# Patient Record
Sex: MALE | Race: WHITE | NOT HISPANIC OR LATINO | Employment: OTHER | URBAN - METROPOLITAN AREA
[De-identification: names, ages, dates, MRNs, and addresses within clinical notes are randomized per-mention and may not be internally consistent; named-entity substitution may affect disease eponyms.]

---

## 2023-01-01 ENCOUNTER — APPOINTMENT (OUTPATIENT)
Dept: RADIOLOGY | Facility: MEDICAL CENTER | Age: 58
DRG: 207 | End: 2023-01-01
Attending: INTERNAL MEDICINE
Payer: MEDICARE

## 2023-01-01 ENCOUNTER — APPOINTMENT (OUTPATIENT)
Dept: CARDIOLOGY | Facility: MEDICAL CENTER | Age: 58
DRG: 207 | End: 2023-01-01
Attending: STUDENT IN AN ORGANIZED HEALTH CARE EDUCATION/TRAINING PROGRAM
Payer: MEDICARE

## 2023-01-01 ENCOUNTER — APPOINTMENT (OUTPATIENT)
Dept: RADIOLOGY | Facility: MEDICAL CENTER | Age: 58
DRG: 207 | End: 2023-01-01
Attending: STUDENT IN AN ORGANIZED HEALTH CARE EDUCATION/TRAINING PROGRAM
Payer: MEDICARE

## 2023-01-01 ENCOUNTER — HOSPITAL ENCOUNTER (OUTPATIENT)
Dept: RADIOLOGY | Facility: MEDICAL CENTER | Age: 58
End: 2023-03-21

## 2023-01-01 ENCOUNTER — HOSPITAL ENCOUNTER (INPATIENT)
Facility: MEDICAL CENTER | Age: 58
LOS: 12 days | DRG: 207 | End: 2023-04-02
Attending: INTERNAL MEDICINE | Admitting: INTERNAL MEDICINE
Payer: MEDICARE

## 2023-01-01 ENCOUNTER — PATIENT OUTREACH (OUTPATIENT)
Dept: HEALTH INFORMATION MANAGEMENT | Facility: OTHER | Age: 58
End: 2023-01-01

## 2023-01-01 VITALS
HEART RATE: 141 BPM | HEIGHT: 72 IN | WEIGHT: 313.27 LBS | OXYGEN SATURATION: 84 % | DIASTOLIC BLOOD PRESSURE: 55 MMHG | RESPIRATION RATE: 15 BRPM | TEMPERATURE: 97 F | BODY MASS INDEX: 42.43 KG/M2 | SYSTOLIC BLOOD PRESSURE: 90 MMHG

## 2023-01-01 DIAGNOSIS — I27.20 SEVERE PULMONARY HYPERTENSION (HCC): ICD-10-CM

## 2023-01-01 DIAGNOSIS — J18.9 HOSPITAL ACQUIRED PNA: ICD-10-CM
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
## 2023-01-01 DIAGNOSIS — N17.9 ACUTE KIDNEY INJURY (HCC): ICD-10-CM

## 2023-01-01 DIAGNOSIS — J80 ARDS (ADULT RESPIRATORY DISTRESS SYNDROME) (HCC): ICD-10-CM

## 2023-01-01 LAB
1 3 BETA D GLUCAN INTERP Q4483: NEGATIVE
1,3 BETA GLUCAN SER-MCNC: <31 PG/ML
A FLAVUS AB SER QL ID: NORMAL
A FUMIGATUS1 AB SER QL ID: NORMAL
A FUMIGATUS2 AB SER QL: NORMAL
A FUMIGATUS3 AB SER QL ID: NORMAL
A FUMIGATUS6 AB SER QL ID: NORMAL
A PULLULANS AB SER QL ID: NORMAL
ALBUMIN SERPL BCP-MCNC: 2.8 G/DL (ref 3.2–4.9)
ALBUMIN SERPL BCP-MCNC: 2.9 G/DL (ref 3.2–4.9)
ALBUMIN SERPL BCP-MCNC: 3.1 G/DL (ref 3.2–4.9)
ALBUMIN SERPL BCP-MCNC: 3.2 G/DL (ref 3.2–4.9)
ALBUMIN SERPL BCP-MCNC: 3.4 G/DL (ref 3.2–4.9)
ALBUMIN SERPL BCP-MCNC: 3.4 G/DL (ref 3.2–4.9)
ALBUMIN/GLOB SERPL: 0.8 G/DL
ALBUMIN/GLOB SERPL: 0.9 G/DL
ALBUMIN/GLOB SERPL: 0.9 G/DL
ALBUMIN/GLOB SERPL: 1 G/DL
ALBUMIN/GLOB SERPL: 1.1 G/DL
ALBUMIN/GLOB SERPL: 1.1 G/DL
ALDOLASE SERPL-CCNC: 11 U/L (ref 1.2–7.6)
ALP SERPL-CCNC: 102 U/L (ref 30–99)
ALP SERPL-CCNC: 79 U/L (ref 30–99)
ALP SERPL-CCNC: 79 U/L (ref 30–99)
ALP SERPL-CCNC: 80 U/L (ref 30–99)
ALP SERPL-CCNC: 81 U/L (ref 30–99)
ALP SERPL-CCNC: 87 U/L (ref 30–99)
ALP SERPL-CCNC: 89 U/L (ref 30–99)
ALP SERPL-CCNC: 94 U/L (ref 30–99)
ALP SERPL-CCNC: 96 U/L (ref 30–99)
ALP SERPL-CCNC: 97 U/L (ref 30–99)
ALT SERPL-CCNC: 28 U/L (ref 2–50)
ALT SERPL-CCNC: 29 U/L (ref 2–50)
ALT SERPL-CCNC: 31 U/L (ref 2–50)
ALT SERPL-CCNC: 32 U/L (ref 2–50)
ALT SERPL-CCNC: 34 U/L (ref 2–50)
ALT SERPL-CCNC: 36 U/L (ref 2–50)
ALT SERPL-CCNC: 37 U/L (ref 2–50)
ALT SERPL-CCNC: 37 U/L (ref 2–50)
ALT SERPL-CCNC: 50 U/L (ref 2–50)
ALT SERPL-CCNC: 56 U/L (ref 2–50)
ANION GAP SERPL CALC-SCNC: 0 MMOL/L (ref 7–16)
ANION GAP SERPL CALC-SCNC: 10 MMOL/L (ref 7–16)
ANION GAP SERPL CALC-SCNC: 11 MMOL/L (ref 7–16)
ANION GAP SERPL CALC-SCNC: 12 MMOL/L (ref 7–16)
ANION GAP SERPL CALC-SCNC: 12 MMOL/L (ref 7–16)
ANION GAP SERPL CALC-SCNC: 13 MMOL/L (ref 7–16)
ANION GAP SERPL CALC-SCNC: 14 MMOL/L (ref 7–16)
ANION GAP SERPL CALC-SCNC: 14 MMOL/L (ref 7–16)
ANION GAP SERPL CALC-SCNC: 15 MMOL/L (ref 7–16)
ANION GAP SERPL CALC-SCNC: 16 MMOL/L (ref 7–16)
ANION GAP SERPL CALC-SCNC: 16 MMOL/L (ref 7–16)
ANION GAP SERPL CALC-SCNC: 19 MMOL/L (ref 7–16)
ANION GAP SERPL CALC-SCNC: 19 MMOL/L (ref 7–16)
ANION GAP SERPL CALC-SCNC: 23 MMOL/L (ref 7–16)
ANNOTATION COMMENT IMP: ABNORMAL
APPEARANCE FLD: CLEAR
AST SERPL-CCNC: 15 U/L (ref 12–45)
AST SERPL-CCNC: 19 U/L (ref 12–45)
AST SERPL-CCNC: 24 U/L (ref 12–45)
AST SERPL-CCNC: 26 U/L (ref 12–45)
AST SERPL-CCNC: 27 U/L (ref 12–45)
AST SERPL-CCNC: 27 U/L (ref 12–45)
AST SERPL-CCNC: 28 U/L (ref 12–45)
AST SERPL-CCNC: 29 U/L (ref 12–45)
AST SERPL-CCNC: 29 U/L (ref 12–45)
AST SERPL-CCNC: 32 U/L (ref 12–45)
B PARAP IS1001 DNA NPH QL NAA+NON-PROBE: NOT DETECTED
B PERT.PT PRMT NPH QL NAA+NON-PROBE: NOT DETECTED
BACTERIA BLD CULT: NORMAL
BACTERIA BLD CULT: NORMAL
BACTERIA SPEC RESP CULT: ABNORMAL
BACTERIA SPEC RESP CULT: ABNORMAL
BACTERIA SPEC RESP CULT: NORMAL
BASE EXCESS BLDA CALC-SCNC: -1 MMOL/L (ref -4–3)
BASE EXCESS BLDA CALC-SCNC: -1 MMOL/L (ref -4–3)
BASE EXCESS BLDA CALC-SCNC: -2 MMOL/L (ref -4–3)
BASE EXCESS BLDA CALC-SCNC: -3 MMOL/L (ref -4–3)
BASE EXCESS BLDA CALC-SCNC: -4 MMOL/L (ref -4–3)
BASE EXCESS BLDA CALC-SCNC: -7 MMOL/L (ref -4–3)
BASE EXCESS BLDA CALC-SCNC: -7 MMOL/L (ref -4–3)
BASE EXCESS BLDA CALC-SCNC: -8 MMOL/L (ref -4–3)
BASE EXCESS BLDA CALC-SCNC: -9 MMOL/L (ref -4–3)
BASE EXCESS BLDA CALC-SCNC: -9 MMOL/L (ref -4–3)
BASE EXCESS BLDA CALC-SCNC: 0 MMOL/L (ref -4–3)
BASE EXCESS BLDA CALC-SCNC: 1 MMOL/L (ref -4–3)
BASE EXCESS BLDA CALC-SCNC: 10 MMOL/L (ref -4–3)
BASE EXCESS BLDA CALC-SCNC: 2 MMOL/L (ref -4–3)
BASE EXCESS BLDA CALC-SCNC: 3 MMOL/L (ref -4–3)
BASE EXCESS BLDA CALC-SCNC: 4 MMOL/L (ref -4–3)
BASE EXCESS BLDA CALC-SCNC: 8 MMOL/L (ref -4–3)
BASOPHILS # BLD AUTO: 0.1 % (ref 0–1.8)
BASOPHILS # BLD AUTO: 0.2 % (ref 0–1.8)
BASOPHILS # BLD: 0.01 K/UL (ref 0–0.12)
BASOPHILS # BLD: 0.02 K/UL (ref 0–0.12)
BASOPHILS # BLD: 0.03 K/UL (ref 0–0.12)
BASOPHILS # BLD: 0.05 K/UL (ref 0–0.12)
BASOPHILS # BLD: 0.06 K/UL (ref 0–0.12)
BILIRUB SERPL-MCNC: 0.2 MG/DL (ref 0.1–1.5)
BILIRUB SERPL-MCNC: 0.3 MG/DL (ref 0.1–1.5)
BILIRUB SERPL-MCNC: 0.3 MG/DL (ref 0.1–1.5)
BILIRUB SERPL-MCNC: 0.4 MG/DL (ref 0.1–1.5)
BILIRUB SERPL-MCNC: 0.4 MG/DL (ref 0.1–1.5)
BODY FLD TYPE: NORMAL
BODY TEMPERATURE: 36 CENTIGRADE
BODY TEMPERATURE: 36.5 CENTIGRADE
BODY TEMPERATURE: ABNORMAL DEGREES
BREATHS SETTING VENT: 26
BREATHS SETTING VENT: 3
BREATHS SETTING VENT: 32
BREATHS SETTING VENT: 34
BREATHS SETTING VENT: 36
BREATHS SETTING VENT: 38
BREATHS SETTING VENT: 40
BUN SERPL-MCNC: 100 MG/DL (ref 8–22)
BUN SERPL-MCNC: 102 MG/DL (ref 8–22)
BUN SERPL-MCNC: 102 MG/DL (ref 8–22)
BUN SERPL-MCNC: 108 MG/DL (ref 8–22)
BUN SERPL-MCNC: 111 MG/DL (ref 8–22)
BUN SERPL-MCNC: 37 MG/DL (ref 8–22)
BUN SERPL-MCNC: 40 MG/DL (ref 8–22)
BUN SERPL-MCNC: 41 MG/DL (ref 8–22)
BUN SERPL-MCNC: 42 MG/DL (ref 8–22)
BUN SERPL-MCNC: 42 MG/DL (ref 8–22)
BUN SERPL-MCNC: 46 MG/DL (ref 8–22)
BUN SERPL-MCNC: 47 MG/DL (ref 8–22)
BUN SERPL-MCNC: 64 MG/DL (ref 8–22)
BUN SERPL-MCNC: 69 MG/DL (ref 8–22)
BUN SERPL-MCNC: 79 MG/DL (ref 8–22)
BUN SERPL-MCNC: 85 MG/DL (ref 8–22)
BUN SERPL-MCNC: 87 MG/DL (ref 8–22)
BUN SERPL-MCNC: 90 MG/DL (ref 8–22)
BUN SERPL-MCNC: 91 MG/DL (ref 8–22)
BUN SERPL-MCNC: 95 MG/DL (ref 8–22)
C IMMITIS AB SER QL ID: NOT DETECTED
C PNEUM DNA NPH QL NAA+NON-PROBE: NOT DETECTED
C3 SERPL-MCNC: 149.2 MG/DL (ref 87–200)
C4 SERPL-MCNC: 28.9 MG/DL (ref 19–52)
CALCIUM ALBUM COR SERPL-MCNC: 10.2 MG/DL (ref 8.5–10.5)
CALCIUM ALBUM COR SERPL-MCNC: 9.1 MG/DL (ref 8.5–10.5)
CALCIUM ALBUM COR SERPL-MCNC: 9.2 MG/DL (ref 8.5–10.5)
CALCIUM ALBUM COR SERPL-MCNC: 9.3 MG/DL (ref 8.5–10.5)
CALCIUM ALBUM COR SERPL-MCNC: 9.3 MG/DL (ref 8.5–10.5)
CALCIUM ALBUM COR SERPL-MCNC: 9.4 MG/DL (ref 8.5–10.5)
CALCIUM ALBUM COR SERPL-MCNC: 9.5 MG/DL (ref 8.5–10.5)
CALCIUM ALBUM COR SERPL-MCNC: 9.6 MG/DL (ref 8.5–10.5)
CALCIUM ALBUM COR SERPL-MCNC: 9.6 MG/DL (ref 8.5–10.5)
CALCIUM ALBUM COR SERPL-MCNC: 9.8 MG/DL (ref 8.5–10.5)
CALCIUM SERPL-MCNC: 7.7 MG/DL (ref 8.5–10.5)
CALCIUM SERPL-MCNC: 8.2 MG/DL (ref 8.5–10.5)
CALCIUM SERPL-MCNC: 8.4 MG/DL (ref 8.5–10.5)
CALCIUM SERPL-MCNC: 8.4 MG/DL (ref 8.5–10.5)
CALCIUM SERPL-MCNC: 8.6 MG/DL (ref 8.5–10.5)
CALCIUM SERPL-MCNC: 8.7 MG/DL (ref 8.5–10.5)
CALCIUM SERPL-MCNC: 8.9 MG/DL (ref 8.5–10.5)
CALCIUM SERPL-MCNC: 9 MG/DL (ref 8.5–10.5)
CALCIUM SERPL-MCNC: 9 MG/DL (ref 8.5–10.5)
CALCIUM SERPL-MCNC: 9.1 MG/DL (ref 8.5–10.5)
CALCIUM SERPL-MCNC: 9.3 MG/DL (ref 8.5–10.5)
CALCIUM SERPL-MCNC: 9.3 MG/DL (ref 8.5–10.5)
CALCIUM SERPL-MCNC: 9.4 MG/DL (ref 8.5–10.5)
CALCIUM SERPL-MCNC: 9.4 MG/DL (ref 8.5–10.5)
CALCIUM SERPL-MCNC: 9.5 MG/DL (ref 8.5–10.5)
CALCIUM SERPL-MCNC: 9.5 MG/DL (ref 8.5–10.5)
CALCIUM SERPL-MCNC: 9.6 MG/DL (ref 8.5–10.5)
CALCIUM SERPL-MCNC: 9.6 MG/DL (ref 8.5–10.5)
CARBAMYLATED PROTEIN ANTIBODY, IGG Q6043: 7 UNITS (ref 0–19)
CCP IGG SERPL-ACNC: 2 UNITS (ref 0–19)
CD3 CELLS # BLD: 709 CELLS/UL (ref 570–2400)
CD3+CD4+ CELLS # BLD: 495 CELLS/UL (ref 430–1800)
CD3+CD4+ CELLS/CD3+CD8+ CLL BLD: 2.37 RATIO (ref 0.8–3.9)
CD3+CD8+ CELLS # BLD: 209 CELLS/UL (ref 210–1200)
CENTROMERE IGG TITR SER IF: 0 AU/ML (ref 0–40)
CHLORIDE SERPL-SCNC: 100 MMOL/L (ref 96–112)
CHLORIDE SERPL-SCNC: 101 MMOL/L (ref 96–112)
CHLORIDE SERPL-SCNC: 101 MMOL/L (ref 96–112)
CHLORIDE SERPL-SCNC: 102 MMOL/L (ref 96–112)
CHLORIDE SERPL-SCNC: 103 MMOL/L (ref 96–112)
CHLORIDE SERPL-SCNC: 104 MMOL/L (ref 96–112)
CHLORIDE SERPL-SCNC: 106 MMOL/L (ref 96–112)
CHLORIDE SERPL-SCNC: 106 MMOL/L (ref 96–112)
CHLORIDE SERPL-SCNC: 109 MMOL/L (ref 96–112)
CHLORIDE SERPL-SCNC: 93 MMOL/L (ref 96–112)
CHLORIDE SERPL-SCNC: 95 MMOL/L (ref 96–112)
CHLORIDE SERPL-SCNC: 95 MMOL/L (ref 96–112)
CHLORIDE SERPL-SCNC: 96 MMOL/L (ref 96–112)
CHLORIDE SERPL-SCNC: 96 MMOL/L (ref 96–112)
CHLORIDE SERPL-SCNC: 99 MMOL/L (ref 96–112)
CHLORIDE SERPL-SCNC: 99 MMOL/L (ref 96–112)
CK SERPL-CCNC: 29 U/L (ref 0–154)
CO2 BLDA-SCNC: 25 MMOL/L (ref 20–33)
CO2 BLDA-SCNC: 26 MMOL/L (ref 20–33)
CO2 BLDA-SCNC: 27 MMOL/L (ref 20–33)
CO2 BLDA-SCNC: 28 MMOL/L (ref 20–33)
CO2 BLDA-SCNC: 29 MMOL/L (ref 20–33)
CO2 BLDA-SCNC: 30 MMOL/L (ref 20–33)
CO2 BLDA-SCNC: 31 MMOL/L (ref 20–33)
CO2 BLDA-SCNC: 32 MMOL/L (ref 20–33)
CO2 BLDA-SCNC: 32 MMOL/L (ref 20–33)
CO2 BLDA-SCNC: 35 MMOL/L (ref 20–33)
CO2 BLDA-SCNC: 35 MMOL/L (ref 20–33)
CO2 BLDA-SCNC: 36 MMOL/L (ref 20–33)
CO2 BLDA-SCNC: 37 MMOL/L (ref 20–33)
CO2 BLDA-SCNC: 38 MMOL/L (ref 20–33)
CO2 BLDA-SCNC: 39 MMOL/L (ref 20–33)
CO2 SERPL-SCNC: 18 MMOL/L (ref 20–33)
CO2 SERPL-SCNC: 20 MMOL/L (ref 20–33)
CO2 SERPL-SCNC: 22 MMOL/L (ref 20–33)
CO2 SERPL-SCNC: 23 MMOL/L (ref 20–33)
CO2 SERPL-SCNC: 25 MMOL/L (ref 20–33)
CO2 SERPL-SCNC: 25 MMOL/L (ref 20–33)
CO2 SERPL-SCNC: 26 MMOL/L (ref 20–33)
CO2 SERPL-SCNC: 27 MMOL/L (ref 20–33)
CO2 SERPL-SCNC: 28 MMOL/L (ref 20–33)
CO2 SERPL-SCNC: 29 MMOL/L (ref 20–33)
CO2 SERPL-SCNC: 30 MMOL/L (ref 20–33)
COCCIDIOIDES AB TITR SER CF: NORMAL {TITER}
COCCIDIOIDES IGG SER-ACNC: 0.6 IV
COCCIDIOIDES IGM SERPL-ACNC: 0.3 IV
COLOR FLD: NORMAL
CREAT SERPL-MCNC: 0.71 MG/DL (ref 0.5–1.4)
CREAT SERPL-MCNC: 0.72 MG/DL (ref 0.5–1.4)
CREAT SERPL-MCNC: 0.81 MG/DL (ref 0.5–1.4)
CREAT SERPL-MCNC: 0.87 MG/DL (ref 0.5–1.4)
CREAT SERPL-MCNC: 0.9 MG/DL (ref 0.5–1.4)
CREAT SERPL-MCNC: 0.93 MG/DL (ref 0.5–1.4)
CREAT SERPL-MCNC: 1.02 MG/DL (ref 0.5–1.4)
CREAT SERPL-MCNC: 1.4 MG/DL (ref 0.5–1.4)
CREAT SERPL-MCNC: 1.41 MG/DL (ref 0.5–1.4)
CREAT SERPL-MCNC: 2.12 MG/DL (ref 0.5–1.4)
CREAT SERPL-MCNC: 2.3 MG/DL (ref 0.5–1.4)
CREAT SERPL-MCNC: 2.4 MG/DL (ref 0.5–1.4)
CREAT SERPL-MCNC: 2.45 MG/DL (ref 0.5–1.4)
CREAT SERPL-MCNC: 2.55 MG/DL (ref 0.5–1.4)
CREAT SERPL-MCNC: 2.62 MG/DL (ref 0.5–1.4)
CREAT SERPL-MCNC: 2.63 MG/DL (ref 0.5–1.4)
CREAT SERPL-MCNC: 2.78 MG/DL (ref 0.5–1.4)
CREAT SERPL-MCNC: 3.67 MG/DL (ref 0.5–1.4)
CREAT SERPL-MCNC: 3.9 MG/DL (ref 0.5–1.4)
CREAT SERPL-MCNC: 4.6 MG/DL (ref 0.5–1.4)
CRP SERPL HS-MCNC: 0.9 MG/DL (ref 0–0.75)
CRP SERPL HS-MCNC: 1.09 MG/DL (ref 0–0.75)
CRP SERPL HS-MCNC: 2.78 MG/DL (ref 0–0.75)
CRP SERPL HS-MCNC: 6.02 MG/DL (ref 0–0.75)
CYTOLOGY REG CYTOL: NORMAL
CYTOLOGY REG CYTOL: NORMAL
DELSYS IDSYS: ABNORMAL
EKG IMPRESSION: NORMAL
ENA JO1 AB TITR SER: 0 AU/ML (ref 0–40)
ENA SCL70 IGG SER QL: 2 AU/ML (ref 0–40)
ENA SM IGG SER-ACNC: 1 AU/ML (ref 0–40)
ENA SS-B IGG SER IA-ACNC: 1 AU/ML (ref 0–40)
END TIDAL CARBON DIOXIDE IECO2: 63 MMHG
END TIDAL CARBON DIOXIDE IECO2: 65 MMHG
END TIDAL CARBON DIOXIDE IECO2: 68 MMHG
END TIDAL CARBON DIOXIDE IECO2: 69 MMHG
EOSINOPHIL # BLD AUTO: 0 K/UL (ref 0–0.51)
EOSINOPHIL # BLD AUTO: 0.01 K/UL (ref 0–0.51)
EOSINOPHIL # BLD AUTO: 0.01 K/UL (ref 0–0.51)
EOSINOPHIL # BLD AUTO: 0.02 K/UL (ref 0–0.51)
EOSINOPHIL # BLD AUTO: 0.07 K/UL (ref 0–0.51)
EOSINOPHIL # BLD AUTO: 0.13 K/UL (ref 0–0.51)
EOSINOPHIL # BLD AUTO: 0.14 K/UL (ref 0–0.51)
EOSINOPHIL # BLD AUTO: 0.2 K/UL (ref 0–0.51)
EOSINOPHIL NFR BLD: 0 % (ref 0–6.9)
EOSINOPHIL NFR BLD: 0.1 % (ref 0–6.9)
EOSINOPHIL NFR BLD: 0.3 % (ref 0–6.9)
EOSINOPHIL NFR BLD: 0.4 % (ref 0–6.9)
EOSINOPHIL NFR BLD: 0.6 % (ref 0–6.9)
EOSINOPHIL NFR BLD: 0.6 % (ref 0–6.9)
ERYTHROCYTE [DISTWIDTH] IN BLOOD BY AUTOMATED COUNT: 42.8 FL (ref 35.9–50)
ERYTHROCYTE [DISTWIDTH] IN BLOOD BY AUTOMATED COUNT: 43.9 FL (ref 35.9–50)
ERYTHROCYTE [DISTWIDTH] IN BLOOD BY AUTOMATED COUNT: 45.6 FL (ref 35.9–50)
ERYTHROCYTE [DISTWIDTH] IN BLOOD BY AUTOMATED COUNT: 46 FL (ref 35.9–50)
ERYTHROCYTE [DISTWIDTH] IN BLOOD BY AUTOMATED COUNT: 46 FL (ref 35.9–50)
ERYTHROCYTE [DISTWIDTH] IN BLOOD BY AUTOMATED COUNT: 46.5 FL (ref 35.9–50)
ERYTHROCYTE [DISTWIDTH] IN BLOOD BY AUTOMATED COUNT: 47 FL (ref 35.9–50)
ERYTHROCYTE [DISTWIDTH] IN BLOOD BY AUTOMATED COUNT: 47 FL (ref 35.9–50)
ERYTHROCYTE [DISTWIDTH] IN BLOOD BY AUTOMATED COUNT: 47.4 FL (ref 35.9–50)
ERYTHROCYTE [DISTWIDTH] IN BLOOD BY AUTOMATED COUNT: 47.7 FL (ref 35.9–50)
ERYTHROCYTE [DISTWIDTH] IN BLOOD BY AUTOMATED COUNT: 48.6 FL (ref 35.9–50)
ERYTHROCYTE [DISTWIDTH] IN BLOOD BY AUTOMATED COUNT: 48.8 FL (ref 35.9–50)
ERYTHROCYTE [DISTWIDTH] IN BLOOD BY AUTOMATED COUNT: 49.2 FL (ref 35.9–50)
ERYTHROCYTE [SEDIMENTATION RATE] IN BLOOD BY WESTERGREN METHOD: 20 MM/HOUR (ref 0–20)
ERYTHROCYTE [SEDIMENTATION RATE] IN BLOOD BY WESTERGREN METHOD: 20 MM/HOUR (ref 0–20)
ERYTHROCYTE [SEDIMENTATION RATE] IN BLOOD BY WESTERGREN METHOD: 26 MM/HOUR (ref 0–20)
ERYTHROCYTE [SEDIMENTATION RATE] IN BLOOD BY WESTERGREN METHOD: 30 MM/HOUR (ref 0–20)
ERYTHROCYTE [SEDIMENTATION RATE] IN BLOOD BY WESTERGREN METHOD: 34 MM/HOUR (ref 0–20)
ERYTHROCYTE [SEDIMENTATION RATE] IN BLOOD BY WESTERGREN METHOD: 34 MM/HOUR (ref 0–20)
ERYTHROCYTE [SEDIMENTATION RATE] IN BLOOD BY WESTERGREN METHOD: 48 MM/HOUR (ref 0–20)
ERYTHROCYTE [SEDIMENTATION RATE] IN BLOOD BY WESTERGREN METHOD: 50 MM/HOUR (ref 0–20)
ERYTHROCYTE [SEDIMENTATION RATE] IN BLOOD BY WESTERGREN METHOD: 6 MM/HOUR (ref 0–20)
ERYTHROCYTE [SEDIMENTATION RATE] IN BLOOD BY WESTERGREN METHOD: 79 MM/HOUR (ref 0–20)
ERYTHROCYTE [SEDIMENTATION RATE] IN BLOOD BY WESTERGREN METHOD: 9 MM/HOUR (ref 0–20)
EST. AVERAGE GLUCOSE BLD GHB EST-MCNC: 174 MG/DL
FLUAV RNA NPH QL NAA+NON-PROBE: NOT DETECTED
FLUAV RNA SPEC QL NAA+PROBE: NEGATIVE
FLUBV RNA NPH QL NAA+NON-PROBE: NOT DETECTED
FLUBV RNA SPEC QL NAA+PROBE: NEGATIVE
FUNGUS SPEC FUNGUS STN: NORMAL
GALACTOMANNAN AG SERPL QL IA: NEGATIVE
GALACTOMANNAN AG SERPL-ACNC: 0.2
GFR SERPLBLD CREATININE-BSD FMLA CKD-EPI: 101 ML/MIN/1.73 M 2
GFR SERPLBLD CREATININE-BSD FMLA CKD-EPI: 105 ML/MIN/1.73 M 2
GFR SERPLBLD CREATININE-BSD FMLA CKD-EPI: 105 ML/MIN/1.73 M 2
GFR SERPLBLD CREATININE-BSD FMLA CKD-EPI: 14 ML/MIN/1.73 M 2
GFR SERPLBLD CREATININE-BSD FMLA CKD-EPI: 17 ML/MIN/1.73 M 2
GFR SERPLBLD CREATININE-BSD FMLA CKD-EPI: 18 ML/MIN/1.73 M 2
GFR SERPLBLD CREATININE-BSD FMLA CKD-EPI: 25 ML/MIN/1.73 M 2
GFR SERPLBLD CREATININE-BSD FMLA CKD-EPI: 27 ML/MIN/1.73 M 2
GFR SERPLBLD CREATININE-BSD FMLA CKD-EPI: 27 ML/MIN/1.73 M 2
GFR SERPLBLD CREATININE-BSD FMLA CKD-EPI: 28 ML/MIN/1.73 M 2
GFR SERPLBLD CREATININE-BSD FMLA CKD-EPI: 29 ML/MIN/1.73 M 2
GFR SERPLBLD CREATININE-BSD FMLA CKD-EPI: 30 ML/MIN/1.73 M 2
GFR SERPLBLD CREATININE-BSD FMLA CKD-EPI: 32 ML/MIN/1.73 M 2
GFR SERPLBLD CREATININE-BSD FMLA CKD-EPI: 35 ML/MIN/1.73 M 2
GFR SERPLBLD CREATININE-BSD FMLA CKD-EPI: 57 ML/MIN/1.73 M 2
GFR SERPLBLD CREATININE-BSD FMLA CKD-EPI: 58 ML/MIN/1.73 M 2
GFR SERPLBLD CREATININE-BSD FMLA CKD-EPI: 84 ML/MIN/1.73 M 2
GFR SERPLBLD CREATININE-BSD FMLA CKD-EPI: 94 ML/MIN/1.73 M 2
GFR SERPLBLD CREATININE-BSD FMLA CKD-EPI: 98 ML/MIN/1.73 M 2
GFR SERPLBLD CREATININE-BSD FMLA CKD-EPI: 99 ML/MIN/1.73 M 2
GLOBULIN SER CALC-MCNC: 2.7 G/DL (ref 1.9–3.5)
GLOBULIN SER CALC-MCNC: 2.8 G/DL (ref 1.9–3.5)
GLOBULIN SER CALC-MCNC: 2.8 G/DL (ref 1.9–3.5)
GLOBULIN SER CALC-MCNC: 2.9 G/DL (ref 1.9–3.5)
GLOBULIN SER CALC-MCNC: 3.1 G/DL (ref 1.9–3.5)
GLOBULIN SER CALC-MCNC: 3.3 G/DL (ref 1.9–3.5)
GLOBULIN SER CALC-MCNC: 3.4 G/DL (ref 1.9–3.5)
GLOBULIN SER CALC-MCNC: 3.5 G/DL (ref 1.9–3.5)
GLOBULIN SER CALC-MCNC: 3.5 G/DL (ref 1.9–3.5)
GLOBULIN SER CALC-MCNC: 3.9 G/DL (ref 1.9–3.5)
GLUCOSE BLD STRIP.AUTO-MCNC: 104 MG/DL (ref 65–99)
GLUCOSE BLD STRIP.AUTO-MCNC: 112 MG/DL (ref 65–99)
GLUCOSE BLD STRIP.AUTO-MCNC: 113 MG/DL (ref 65–99)
GLUCOSE BLD STRIP.AUTO-MCNC: 114 MG/DL (ref 65–99)
GLUCOSE BLD STRIP.AUTO-MCNC: 118 MG/DL (ref 65–99)
GLUCOSE BLD STRIP.AUTO-MCNC: 118 MG/DL (ref 65–99)
GLUCOSE BLD STRIP.AUTO-MCNC: 120 MG/DL (ref 65–99)
GLUCOSE BLD STRIP.AUTO-MCNC: 120 MG/DL (ref 65–99)
GLUCOSE BLD STRIP.AUTO-MCNC: 122 MG/DL (ref 65–99)
GLUCOSE BLD STRIP.AUTO-MCNC: 124 MG/DL (ref 65–99)
GLUCOSE BLD STRIP.AUTO-MCNC: 124 MG/DL (ref 65–99)
GLUCOSE BLD STRIP.AUTO-MCNC: 128 MG/DL (ref 65–99)
GLUCOSE BLD STRIP.AUTO-MCNC: 129 MG/DL (ref 65–99)
GLUCOSE BLD STRIP.AUTO-MCNC: 130 MG/DL (ref 65–99)
GLUCOSE BLD STRIP.AUTO-MCNC: 133 MG/DL (ref 65–99)
GLUCOSE BLD STRIP.AUTO-MCNC: 136 MG/DL (ref 65–99)
GLUCOSE BLD STRIP.AUTO-MCNC: 137 MG/DL (ref 65–99)
GLUCOSE BLD STRIP.AUTO-MCNC: 137 MG/DL (ref 65–99)
GLUCOSE BLD STRIP.AUTO-MCNC: 138 MG/DL (ref 65–99)
GLUCOSE BLD STRIP.AUTO-MCNC: 140 MG/DL (ref 65–99)
GLUCOSE BLD STRIP.AUTO-MCNC: 142 MG/DL (ref 65–99)
GLUCOSE BLD STRIP.AUTO-MCNC: 144 MG/DL (ref 65–99)
GLUCOSE BLD STRIP.AUTO-MCNC: 145 MG/DL (ref 65–99)
GLUCOSE BLD STRIP.AUTO-MCNC: 146 MG/DL (ref 65–99)
GLUCOSE BLD STRIP.AUTO-MCNC: 147 MG/DL (ref 65–99)
GLUCOSE BLD STRIP.AUTO-MCNC: 147 MG/DL (ref 65–99)
GLUCOSE BLD STRIP.AUTO-MCNC: 149 MG/DL (ref 65–99)
GLUCOSE BLD STRIP.AUTO-MCNC: 149 MG/DL (ref 65–99)
GLUCOSE BLD STRIP.AUTO-MCNC: 150 MG/DL (ref 65–99)
GLUCOSE BLD STRIP.AUTO-MCNC: 150 MG/DL (ref 65–99)
GLUCOSE BLD STRIP.AUTO-MCNC: 152 MG/DL (ref 65–99)
GLUCOSE BLD STRIP.AUTO-MCNC: 153 MG/DL (ref 65–99)
GLUCOSE BLD STRIP.AUTO-MCNC: 153 MG/DL (ref 65–99)
GLUCOSE BLD STRIP.AUTO-MCNC: 154 MG/DL (ref 65–99)
GLUCOSE BLD STRIP.AUTO-MCNC: 155 MG/DL (ref 65–99)
GLUCOSE BLD STRIP.AUTO-MCNC: 156 MG/DL (ref 65–99)
GLUCOSE BLD STRIP.AUTO-MCNC: 157 MG/DL (ref 65–99)
GLUCOSE BLD STRIP.AUTO-MCNC: 157 MG/DL (ref 65–99)
GLUCOSE BLD STRIP.AUTO-MCNC: 160 MG/DL (ref 65–99)
GLUCOSE BLD STRIP.AUTO-MCNC: 161 MG/DL (ref 65–99)
GLUCOSE BLD STRIP.AUTO-MCNC: 161 MG/DL (ref 65–99)
GLUCOSE BLD STRIP.AUTO-MCNC: 165 MG/DL (ref 65–99)
GLUCOSE BLD STRIP.AUTO-MCNC: 167 MG/DL (ref 65–99)
GLUCOSE BLD STRIP.AUTO-MCNC: 168 MG/DL (ref 65–99)
GLUCOSE BLD STRIP.AUTO-MCNC: 170 MG/DL (ref 65–99)
GLUCOSE BLD STRIP.AUTO-MCNC: 171 MG/DL (ref 65–99)
GLUCOSE BLD STRIP.AUTO-MCNC: 172 MG/DL (ref 65–99)
GLUCOSE BLD STRIP.AUTO-MCNC: 173 MG/DL (ref 65–99)
GLUCOSE BLD STRIP.AUTO-MCNC: 174 MG/DL (ref 65–99)
GLUCOSE BLD STRIP.AUTO-MCNC: 177 MG/DL (ref 65–99)
GLUCOSE BLD STRIP.AUTO-MCNC: 178 MG/DL (ref 65–99)
GLUCOSE BLD STRIP.AUTO-MCNC: 178 MG/DL (ref 65–99)
GLUCOSE BLD STRIP.AUTO-MCNC: 179 MG/DL (ref 65–99)
GLUCOSE BLD STRIP.AUTO-MCNC: 179 MG/DL (ref 65–99)
GLUCOSE BLD STRIP.AUTO-MCNC: 181 MG/DL (ref 65–99)
GLUCOSE BLD STRIP.AUTO-MCNC: 183 MG/DL (ref 65–99)
GLUCOSE BLD STRIP.AUTO-MCNC: 184 MG/DL (ref 65–99)
GLUCOSE BLD STRIP.AUTO-MCNC: 185 MG/DL (ref 65–99)
GLUCOSE BLD STRIP.AUTO-MCNC: 187 MG/DL (ref 65–99)
GLUCOSE BLD STRIP.AUTO-MCNC: 188 MG/DL (ref 65–99)
GLUCOSE BLD STRIP.AUTO-MCNC: 188 MG/DL (ref 65–99)
GLUCOSE BLD STRIP.AUTO-MCNC: 189 MG/DL (ref 65–99)
GLUCOSE BLD STRIP.AUTO-MCNC: 190 MG/DL (ref 65–99)
GLUCOSE BLD STRIP.AUTO-MCNC: 192 MG/DL (ref 65–99)
GLUCOSE BLD STRIP.AUTO-MCNC: 194 MG/DL (ref 65–99)
GLUCOSE BLD STRIP.AUTO-MCNC: 197 MG/DL (ref 65–99)
GLUCOSE BLD STRIP.AUTO-MCNC: 197 MG/DL (ref 65–99)
GLUCOSE BLD STRIP.AUTO-MCNC: 199 MG/DL (ref 65–99)
GLUCOSE BLD STRIP.AUTO-MCNC: 200 MG/DL (ref 65–99)
GLUCOSE BLD STRIP.AUTO-MCNC: 201 MG/DL (ref 65–99)
GLUCOSE BLD STRIP.AUTO-MCNC: 203 MG/DL (ref 65–99)
GLUCOSE BLD STRIP.AUTO-MCNC: 203 MG/DL (ref 65–99)
GLUCOSE BLD STRIP.AUTO-MCNC: 209 MG/DL (ref 65–99)
GLUCOSE BLD STRIP.AUTO-MCNC: 211 MG/DL (ref 65–99)
GLUCOSE BLD STRIP.AUTO-MCNC: 211 MG/DL (ref 65–99)
GLUCOSE BLD STRIP.AUTO-MCNC: 212 MG/DL (ref 65–99)
GLUCOSE BLD STRIP.AUTO-MCNC: 212 MG/DL (ref 65–99)
GLUCOSE BLD STRIP.AUTO-MCNC: 213 MG/DL (ref 65–99)
GLUCOSE BLD STRIP.AUTO-MCNC: 214 MG/DL (ref 65–99)
GLUCOSE BLD STRIP.AUTO-MCNC: 215 MG/DL (ref 65–99)
GLUCOSE BLD STRIP.AUTO-MCNC: 215 MG/DL (ref 65–99)
GLUCOSE BLD STRIP.AUTO-MCNC: 217 MG/DL (ref 65–99)
GLUCOSE BLD STRIP.AUTO-MCNC: 218 MG/DL (ref 65–99)
GLUCOSE BLD STRIP.AUTO-MCNC: 225 MG/DL (ref 65–99)
GLUCOSE BLD STRIP.AUTO-MCNC: 228 MG/DL (ref 65–99)
GLUCOSE BLD STRIP.AUTO-MCNC: 228 MG/DL (ref 65–99)
GLUCOSE BLD STRIP.AUTO-MCNC: 233 MG/DL (ref 65–99)
GLUCOSE BLD STRIP.AUTO-MCNC: 236 MG/DL (ref 65–99)
GLUCOSE BLD STRIP.AUTO-MCNC: 243 MG/DL (ref 65–99)
GLUCOSE BLD STRIP.AUTO-MCNC: 256 MG/DL (ref 65–99)
GLUCOSE BLD STRIP.AUTO-MCNC: 259 MG/DL (ref 65–99)
GLUCOSE BLD STRIP.AUTO-MCNC: 271 MG/DL (ref 65–99)
GLUCOSE BLD STRIP.AUTO-MCNC: 278 MG/DL (ref 65–99)
GLUCOSE BLD STRIP.AUTO-MCNC: 286 MG/DL (ref 65–99)
GLUCOSE BLD STRIP.AUTO-MCNC: 336 MG/DL (ref 65–99)
GLUCOSE BLD STRIP.AUTO-MCNC: 350 MG/DL (ref 65–99)
GLUCOSE BLD STRIP.AUTO-MCNC: 97 MG/DL (ref 65–99)
GLUCOSE SERPL-MCNC: 151 MG/DL (ref 65–99)
GLUCOSE SERPL-MCNC: 157 MG/DL (ref 65–99)
GLUCOSE SERPL-MCNC: 157 MG/DL (ref 65–99)
GLUCOSE SERPL-MCNC: 158 MG/DL (ref 65–99)
GLUCOSE SERPL-MCNC: 160 MG/DL (ref 65–99)
GLUCOSE SERPL-MCNC: 161 MG/DL (ref 65–99)
GLUCOSE SERPL-MCNC: 164 MG/DL (ref 65–99)
GLUCOSE SERPL-MCNC: 167 MG/DL (ref 65–99)
GLUCOSE SERPL-MCNC: 167 MG/DL (ref 65–99)
GLUCOSE SERPL-MCNC: 172 MG/DL (ref 65–99)
GLUCOSE SERPL-MCNC: 173 MG/DL (ref 65–99)
GLUCOSE SERPL-MCNC: 183 MG/DL (ref 65–99)
GLUCOSE SERPL-MCNC: 189 MG/DL (ref 65–99)
GLUCOSE SERPL-MCNC: 190 MG/DL (ref 65–99)
GLUCOSE SERPL-MCNC: 195 MG/DL (ref 65–99)
GLUCOSE SERPL-MCNC: 195 MG/DL (ref 65–99)
GLUCOSE SERPL-MCNC: 200 MG/DL (ref 65–99)
GLUCOSE SERPL-MCNC: 209 MG/DL (ref 65–99)
GLUCOSE SERPL-MCNC: 252 MG/DL (ref 65–99)
GLUCOSE SERPL-MCNC: 319 MG/DL (ref 65–99)
GRAM STN SPEC: ABNORMAL
GRAM STN SPEC: NORMAL
HADV DNA NPH QL NAA+NON-PROBE: NOT DETECTED
HAV IGM SERPL QL IA: NORMAL
HBA1C MFR BLD: 7.7 % (ref 4–5.6)
HBV CORE IGM SER QL: NORMAL
HBV SURFACE AB SERPL IA-ACNC: <3.5 MIU/ML (ref 0–10)
HBV SURFACE AG SER QL: NORMAL
HCO3 BLDA-SCNC: 23 MMOL/L (ref 17–25)
HCO3 BLDA-SCNC: 23.1 MMOL/L (ref 17–25)
HCO3 BLDA-SCNC: 23.3 MMOL/L (ref 17–25)
HCO3 BLDA-SCNC: 23.8 MMOL/L (ref 17–25)
HCO3 BLDA-SCNC: 24 MMOL/L (ref 17–25)
HCO3 BLDA-SCNC: 24.1 MMOL/L (ref 17–25)
HCO3 BLDA-SCNC: 24.1 MMOL/L (ref 17–25)
HCO3 BLDA-SCNC: 24.2 MMOL/L (ref 17–25)
HCO3 BLDA-SCNC: 24.8 MMOL/L (ref 17–25)
HCO3 BLDA-SCNC: 24.9 MMOL/L (ref 17–25)
HCO3 BLDA-SCNC: 25 MMOL/L (ref 17–25)
HCO3 BLDA-SCNC: 25.5 MMOL/L (ref 17–25)
HCO3 BLDA-SCNC: 25.9 MMOL/L (ref 17–25)
HCO3 BLDA-SCNC: 26 MMOL/L (ref 17–25)
HCO3 BLDA-SCNC: 26.8 MMOL/L (ref 17–25)
HCO3 BLDA-SCNC: 26.9 MMOL/L (ref 17–25)
HCO3 BLDA-SCNC: 26.9 MMOL/L (ref 17–25)
HCO3 BLDA-SCNC: 27 MMOL/L (ref 17–25)
HCO3 BLDA-SCNC: 27.6 MMOL/L (ref 17–25)
HCO3 BLDA-SCNC: 27.7 MMOL/L (ref 17–25)
HCO3 BLDA-SCNC: 27.8 MMOL/L (ref 17–25)
HCO3 BLDA-SCNC: 28 MMOL/L (ref 17–25)
HCO3 BLDA-SCNC: 28.1 MMOL/L (ref 17–25)
HCO3 BLDA-SCNC: 28.2 MMOL/L (ref 17–25)
HCO3 BLDA-SCNC: 28.4 MMOL/L (ref 17–25)
HCO3 BLDA-SCNC: 29.8 MMOL/L (ref 17–25)
HCO3 BLDA-SCNC: 30.1 MMOL/L (ref 17–25)
HCO3 BLDA-SCNC: 33.1 MMOL/L (ref 17–25)
HCO3 BLDA-SCNC: 33.2 MMOL/L (ref 17–25)
HCO3 BLDA-SCNC: 33.3 MMOL/L (ref 17–25)
HCO3 BLDA-SCNC: 33.6 MMOL/L (ref 17–25)
HCO3 BLDA-SCNC: 33.6 MMOL/L (ref 17–25)
HCO3 BLDA-SCNC: 34.1 MMOL/L (ref 17–25)
HCO3 BLDA-SCNC: 34.6 MMOL/L (ref 17–25)
HCO3 BLDA-SCNC: 35.2 MMOL/L (ref 17–25)
HCO3 BLDA-SCNC: 36.1 MMOL/L (ref 17–25)
HCO3 BLDA-SCNC: 36.2 MMOL/L (ref 17–25)
HCO3 BLDA-SCNC: 36.3 MMOL/L (ref 17–25)
HCOV 229E RNA NPH QL NAA+NON-PROBE: NOT DETECTED
HCOV HKU1 RNA NPH QL NAA+NON-PROBE: NOT DETECTED
HCOV NL63 RNA NPH QL NAA+NON-PROBE: NOT DETECTED
HCOV OC43 RNA NPH QL NAA+NON-PROBE: NOT DETECTED
HCT VFR BLD AUTO: 38.9 % (ref 42–52)
HCT VFR BLD AUTO: 41.2 % (ref 42–52)
HCT VFR BLD AUTO: 41.8 % (ref 42–52)
HCT VFR BLD AUTO: 42 % (ref 42–52)
HCT VFR BLD AUTO: 42.9 % (ref 42–52)
HCT VFR BLD AUTO: 43.8 % (ref 42–52)
HCT VFR BLD AUTO: 43.9 % (ref 42–52)
HCT VFR BLD AUTO: 45.1 % (ref 42–52)
HCT VFR BLD AUTO: 45.7 % (ref 42–52)
HCT VFR BLD AUTO: 46.6 % (ref 42–52)
HCT VFR BLD AUTO: 47.6 % (ref 42–52)
HCT VFR BLD AUTO: 48.4 % (ref 42–52)
HCT VFR BLD AUTO: 50.6 % (ref 42–52)
HCV AB SER QL: NORMAL
HGB BLD-MCNC: 12.6 G/DL (ref 14–18)
HGB BLD-MCNC: 13.1 G/DL (ref 14–18)
HGB BLD-MCNC: 13.2 G/DL (ref 14–18)
HGB BLD-MCNC: 13.9 G/DL (ref 14–18)
HGB BLD-MCNC: 14.3 G/DL (ref 14–18)
HGB BLD-MCNC: 14.7 G/DL (ref 14–18)
HGB BLD-MCNC: 14.8 G/DL (ref 14–18)
HGB BLD-MCNC: 14.8 G/DL (ref 14–18)
HGB BLD-MCNC: 15 G/DL (ref 14–18)
HGB BLD-MCNC: 15.5 G/DL (ref 14–18)
HIV 1+2 AB+HIV1 P24 AG SERPL QL IA: NORMAL
HMPV RNA NPH QL NAA+NON-PROBE: NOT DETECTED
HOROWITZ INDEX BLDA+IHG-RTO: 101 MM[HG]
HOROWITZ INDEX BLDA+IHG-RTO: 103 MM[HG]
HOROWITZ INDEX BLDA+IHG-RTO: 104 MM[HG]
HOROWITZ INDEX BLDA+IHG-RTO: 104 MM[HG]
HOROWITZ INDEX BLDA+IHG-RTO: 107 MM[HG]
HOROWITZ INDEX BLDA+IHG-RTO: 111 MM[HG]
HOROWITZ INDEX BLDA+IHG-RTO: 112 MM[HG]
HOROWITZ INDEX BLDA+IHG-RTO: 114 MM[HG]
HOROWITZ INDEX BLDA+IHG-RTO: 114 MM[HG]
HOROWITZ INDEX BLDA+IHG-RTO: 116 MM[HG]
HOROWITZ INDEX BLDA+IHG-RTO: 116 MM[HG]
HOROWITZ INDEX BLDA+IHG-RTO: 120 MM[HG]
HOROWITZ INDEX BLDA+IHG-RTO: 126 MM[HG]
HOROWITZ INDEX BLDA+IHG-RTO: 126 MM[HG]
HOROWITZ INDEX BLDA+IHG-RTO: 127 MM[HG]
HOROWITZ INDEX BLDA+IHG-RTO: 137 MM[HG]
HOROWITZ INDEX BLDA+IHG-RTO: 162 MM[HG]
HOROWITZ INDEX BLDA+IHG-RTO: 57 MM[HG]
HOROWITZ INDEX BLDA+IHG-RTO: 68 MM[HG]
HOROWITZ INDEX BLDA+IHG-RTO: 73 MM[HG]
HOROWITZ INDEX BLDA+IHG-RTO: 74 MM[HG]
HOROWITZ INDEX BLDA+IHG-RTO: 77 MM[HG]
HOROWITZ INDEX BLDA+IHG-RTO: 79 MM[HG]
HOROWITZ INDEX BLDA+IHG-RTO: 80 MM[HG]
HOROWITZ INDEX BLDA+IHG-RTO: 80 MM[HG]
HOROWITZ INDEX BLDA+IHG-RTO: 81 MM[HG]
HOROWITZ INDEX BLDA+IHG-RTO: 86 MM[HG]
HOROWITZ INDEX BLDA+IHG-RTO: 86 MM[HG]
HOROWITZ INDEX BLDA+IHG-RTO: 90 MM[HG]
HOROWITZ INDEX BLDA+IHG-RTO: 90 MM[HG]
HOROWITZ INDEX BLDA+IHG-RTO: 91 MM[HG]
HOROWITZ INDEX BLDA+IHG-RTO: 94 MM[HG]
HOROWITZ INDEX BLDA+IHG-RTO: 97 MM[HG]
HPIV1 RNA NPH QL NAA+NON-PROBE: NOT DETECTED
HPIV2 RNA NPH QL NAA+NON-PROBE: NOT DETECTED
HPIV3 RNA NPH QL NAA+NON-PROBE: NOT DETECTED
HPIV4 RNA NPH QL NAA+NON-PROBE: NOT DETECTED
IGE SERPL-ACNC: 184 KU/L
IMM GRANULOCYTES # BLD AUTO: 0.08 K/UL (ref 0–0.11)
IMM GRANULOCYTES # BLD AUTO: 0.09 K/UL (ref 0–0.11)
IMM GRANULOCYTES # BLD AUTO: 0.09 K/UL (ref 0–0.11)
IMM GRANULOCYTES # BLD AUTO: 0.13 K/UL (ref 0–0.11)
IMM GRANULOCYTES # BLD AUTO: 0.13 K/UL (ref 0–0.11)
IMM GRANULOCYTES # BLD AUTO: 0.14 K/UL (ref 0–0.11)
IMM GRANULOCYTES # BLD AUTO: 0.19 K/UL (ref 0–0.11)
IMM GRANULOCYTES # BLD AUTO: 0.3 K/UL (ref 0–0.11)
IMM GRANULOCYTES # BLD AUTO: 0.32 K/UL (ref 0–0.11)
IMM GRANULOCYTES # BLD AUTO: 0.34 K/UL (ref 0–0.11)
IMM GRANULOCYTES # BLD AUTO: 0.72 K/UL (ref 0–0.11)
IMM GRANULOCYTES # BLD AUTO: 0.76 K/UL (ref 0–0.11)
IMM GRANULOCYTES # BLD AUTO: 0.91 K/UL (ref 0–0.11)
IMM GRANULOCYTES NFR BLD AUTO: 0.5 % (ref 0–0.9)
IMM GRANULOCYTES NFR BLD AUTO: 0.6 % (ref 0–0.9)
IMM GRANULOCYTES NFR BLD AUTO: 0.8 % (ref 0–0.9)
IMM GRANULOCYTES NFR BLD AUTO: 0.8 % (ref 0–0.9)
IMM GRANULOCYTES NFR BLD AUTO: 1.2 % (ref 0–0.9)
IMM GRANULOCYTES NFR BLD AUTO: 1.3 % (ref 0–0.9)
IMM GRANULOCYTES NFR BLD AUTO: 1.3 % (ref 0–0.9)
IMM GRANULOCYTES NFR BLD AUTO: 1.4 % (ref 0–0.9)
IMM GRANULOCYTES NFR BLD AUTO: 2.3 % (ref 0–0.9)
IMM GRANULOCYTES NFR BLD AUTO: 2.3 % (ref 0–0.9)
IMM GRANULOCYTES NFR BLD AUTO: 3.6 % (ref 0–0.9)
L PNEUMO AG UR QL IA: NEGATIVE
LACTATE BLD-SCNC: 0.4 MMOL/L (ref 0.5–2)
LACTATE BLD-SCNC: 0.5 MMOL/L (ref 0.5–2)
LACTATE BLD-SCNC: 0.6 MMOL/L (ref 0.5–2)
LACTATE BLD-SCNC: 0.7 MMOL/L (ref 0.5–2)
LACTATE BLD-SCNC: 1.1 MMOL/L (ref 0.5–2)
LACTATE BLD-SCNC: 1.3 MMOL/L (ref 0.5–2)
LV EJECT FRACT  99904: 55
LV EJECT FRACT MOD 2C 99903: 61.5
LV EJECT FRACT MOD 4C 99902: 52.44
LV EJECT FRACT MOD BP 99901: 57.34
LYMPHOCYTES # BLD AUTO: 0.37 K/UL (ref 1–4.8)
LYMPHOCYTES # BLD AUTO: 0.43 K/UL (ref 1–4.8)
LYMPHOCYTES # BLD AUTO: 0.67 K/UL (ref 1–4.8)
LYMPHOCYTES # BLD AUTO: 0.75 K/UL (ref 1–4.8)
LYMPHOCYTES # BLD AUTO: 0.79 K/UL (ref 1–4.8)
LYMPHOCYTES # BLD AUTO: 0.79 K/UL (ref 1–4.8)
LYMPHOCYTES # BLD AUTO: 0.83 K/UL (ref 1–4.8)
LYMPHOCYTES # BLD AUTO: 0.91 K/UL (ref 1–4.8)
LYMPHOCYTES # BLD AUTO: 1.04 K/UL (ref 1–4.8)
LYMPHOCYTES # BLD AUTO: 1.42 K/UL (ref 1–4.8)
LYMPHOCYTES # BLD AUTO: 1.51 K/UL (ref 1–4.8)
LYMPHOCYTES # BLD AUTO: 1.55 K/UL (ref 1–4.8)
LYMPHOCYTES # BLD AUTO: 2.02 K/UL (ref 1–4.8)
LYMPHOCYTES NFR BLD: 1.5 % (ref 22–41)
LYMPHOCYTES NFR BLD: 2.8 % (ref 22–41)
LYMPHOCYTES NFR BLD: 2.9 % (ref 22–41)
LYMPHOCYTES NFR BLD: 3.6 % (ref 22–41)
LYMPHOCYTES NFR BLD: 4.2 % (ref 22–41)
LYMPHOCYTES NFR BLD: 4.3 % (ref 22–41)
LYMPHOCYTES NFR BLD: 4.9 % (ref 22–41)
LYMPHOCYTES NFR BLD: 5.5 % (ref 22–41)
LYMPHOCYTES NFR BLD: 5.6 % (ref 22–41)
LYMPHOCYTES NFR BLD: 5.7 % (ref 22–41)
LYMPHOCYTES NFR BLD: 6.1 % (ref 22–41)
LYMPHOCYTES NFR BLD: 6.6 % (ref 22–41)
LYMPHOCYTES NFR BLD: 7.3 % (ref 22–41)
LYMPHOCYTES NFR FLD: 4 %
M PNEUMO DNA NPH QL NAA+NON-PROBE: NOT DETECTED
M PNEUMO IGG SER IA-ACNC: 0.16 U/L
M PNEUMO IGM SER IA-ACNC: 0.51 U/L
MAGNESIUM SERPL-MCNC: 2.3 MG/DL (ref 1.5–2.5)
MAGNESIUM SERPL-MCNC: 2.3 MG/DL (ref 1.5–2.5)
MAGNESIUM SERPL-MCNC: 2.4 MG/DL (ref 1.5–2.5)
MAGNESIUM SERPL-MCNC: 2.4 MG/DL (ref 1.5–2.5)
MAGNESIUM SERPL-MCNC: 2.5 MG/DL (ref 1.5–2.5)
MAGNESIUM SERPL-MCNC: 2.7 MG/DL (ref 1.5–2.5)
MAGNESIUM SERPL-MCNC: 2.7 MG/DL (ref 1.5–2.5)
MCH RBC QN AUTO: 27 PG (ref 27–33)
MCH RBC QN AUTO: 27 PG (ref 27–33)
MCH RBC QN AUTO: 27.1 PG (ref 27–33)
MCH RBC QN AUTO: 27.3 PG (ref 27–33)
MCH RBC QN AUTO: 27.3 PG (ref 27–33)
MCH RBC QN AUTO: 27.4 PG (ref 27–33)
MCH RBC QN AUTO: 27.8 PG (ref 27–33)
MCHC RBC AUTO-ENTMCNC: 30.6 G/DL (ref 33.7–35.3)
MCHC RBC AUTO-ENTMCNC: 30.9 G/DL (ref 33.7–35.3)
MCHC RBC AUTO-ENTMCNC: 31 G/DL (ref 33.7–35.3)
MCHC RBC AUTO-ENTMCNC: 31.6 G/DL (ref 33.7–35.3)
MCHC RBC AUTO-ENTMCNC: 31.7 G/DL (ref 33.7–35.3)
MCHC RBC AUTO-ENTMCNC: 31.8 G/DL (ref 33.7–35.3)
MCHC RBC AUTO-ENTMCNC: 31.8 G/DL (ref 33.7–35.3)
MCHC RBC AUTO-ENTMCNC: 32.4 G/DL (ref 33.7–35.3)
MCHC RBC AUTO-ENTMCNC: 33.1 G/DL (ref 33.7–35.3)
MCV RBC AUTO: 82.7 FL (ref 81.4–97.8)
MCV RBC AUTO: 84.1 FL (ref 81.4–97.8)
MCV RBC AUTO: 84.6 FL (ref 81.4–97.8)
MCV RBC AUTO: 84.9 FL (ref 81.4–97.8)
MCV RBC AUTO: 85.6 FL (ref 81.4–97.8)
MCV RBC AUTO: 85.7 FL (ref 81.4–97.8)
MCV RBC AUTO: 86.2 FL (ref 81.4–97.8)
MCV RBC AUTO: 86.7 FL (ref 81.4–97.8)
MCV RBC AUTO: 87.3 FL (ref 81.4–97.8)
MCV RBC AUTO: 87.6 FL (ref 81.4–97.8)
MCV RBC AUTO: 88 FL (ref 81.4–97.8)
MCV RBC AUTO: 88.3 FL (ref 81.4–97.8)
MCV RBC AUTO: 88.5 FL (ref 81.4–97.8)
MISCELLANEOUS LAB RESULT MISCLAB: NORMAL
MODE IMODE: ABNORMAL
MONOCYTES # BLD AUTO: 0.58 K/UL (ref 0–0.85)
MONOCYTES # BLD AUTO: 0.65 K/UL (ref 0–0.85)
MONOCYTES # BLD AUTO: 0.69 K/UL (ref 0–0.85)
MONOCYTES # BLD AUTO: 0.85 K/UL (ref 0–0.85)
MONOCYTES # BLD AUTO: 0.87 K/UL (ref 0–0.85)
MONOCYTES # BLD AUTO: 0.96 K/UL (ref 0–0.85)
MONOCYTES # BLD AUTO: 1.02 K/UL (ref 0–0.85)
MONOCYTES # BLD AUTO: 1.02 K/UL (ref 0–0.85)
MONOCYTES # BLD AUTO: 1.07 K/UL (ref 0–0.85)
MONOCYTES # BLD AUTO: 1.56 K/UL (ref 0–0.85)
MONOCYTES # BLD AUTO: 1.92 K/UL (ref 0–0.85)
MONOCYTES # BLD AUTO: 2.17 K/UL (ref 0–0.85)
MONOCYTES # BLD AUTO: 2.33 K/UL (ref 0–0.85)
MONOCYTES NFR BLD AUTO: 3.4 % (ref 0–13.4)
MONOCYTES NFR BLD AUTO: 3.7 % (ref 0–13.4)
MONOCYTES NFR BLD AUTO: 3.8 % (ref 0–13.4)
MONOCYTES NFR BLD AUTO: 4.1 % (ref 0–13.4)
MONOCYTES NFR BLD AUTO: 4.3 % (ref 0–13.4)
MONOCYTES NFR BLD AUTO: 4.7 % (ref 0–13.4)
MONOCYTES NFR BLD AUTO: 4.9 % (ref 0–13.4)
MONOCYTES NFR BLD AUTO: 6.2 % (ref 0–13.4)
MONOCYTES NFR BLD AUTO: 6.3 % (ref 0–13.4)
MONOCYTES NFR BLD AUTO: 6.8 % (ref 0–13.4)
MONOCYTES NFR BLD AUTO: 7 % (ref 0–13.4)
MONOCYTES NFR BLD AUTO: 7 % (ref 0–13.4)
MONOCYTES NFR BLD AUTO: 9.1 % (ref 0–13.4)
MONONUC CELLS NFR FLD: 11 %
MYELOPEROXIDASE AB SER-ACNC: 0 AU/ML (ref 0–19)
NEUTROPHILS # BLD AUTO: 13.25 K/UL (ref 1.82–7.42)
NEUTROPHILS # BLD AUTO: 13.41 K/UL (ref 1.82–7.42)
NEUTROPHILS # BLD AUTO: 13.91 K/UL (ref 1.82–7.42)
NEUTROPHILS # BLD AUTO: 14.3 K/UL (ref 1.82–7.42)
NEUTROPHILS # BLD AUTO: 14.61 K/UL (ref 1.82–7.42)
NEUTROPHILS # BLD AUTO: 16.55 K/UL (ref 1.82–7.42)
NEUTROPHILS # BLD AUTO: 17.32 K/UL (ref 1.82–7.42)
NEUTROPHILS # BLD AUTO: 18.7 K/UL (ref 1.82–7.42)
NEUTROPHILS # BLD AUTO: 21.2 K/UL (ref 1.82–7.42)
NEUTROPHILS # BLD AUTO: 23.6 K/UL (ref 1.82–7.42)
NEUTROPHILS # BLD AUTO: 25.3 K/UL (ref 1.82–7.42)
NEUTROPHILS # BLD AUTO: 26.28 K/UL (ref 1.82–7.42)
NEUTROPHILS # BLD AUTO: 28.01 K/UL (ref 1.82–7.42)
NEUTROPHILS NFR BLD: 81.8 % (ref 44–72)
NEUTROPHILS NFR BLD: 83.8 % (ref 44–72)
NEUTROPHILS NFR BLD: 83.8 % (ref 44–72)
NEUTROPHILS NFR BLD: 85.2 % (ref 44–72)
NEUTROPHILS NFR BLD: 85.6 % (ref 44–72)
NEUTROPHILS NFR BLD: 87.5 % (ref 44–72)
NEUTROPHILS NFR BLD: 89.5 % (ref 44–72)
NEUTROPHILS NFR BLD: 90.1 % (ref 44–72)
NEUTROPHILS NFR BLD: 90.7 % (ref 44–72)
NEUTROPHILS NFR BLD: 90.8 % (ref 44–72)
NEUTROPHILS NFR BLD: 91.9 % (ref 44–72)
NEUTROPHILS NFR BLD: 92 % (ref 44–72)
NEUTROPHILS NFR BLD: 93.7 % (ref 44–72)
NEUTROPHILS NFR FLD: 85 %
NRBC # BLD AUTO: 0 K/UL
NRBC # BLD AUTO: 0.02 K/UL
NRBC BLD-RTO: 0 /100 WBC
NRBC BLD-RTO: 0.1 /100 WBC
NT-PROBNP SERPL IA-MCNC: 2085 PG/ML (ref 0–125)
NUCLEAR IGG SER QL IA: NORMAL
O2/TOTAL GAS SETTING VFR VENT: 100 %
O2/TOTAL GAS SETTING VFR VENT: 70 %
O2/TOTAL GAS SETTING VFR VENT: 80 %
O2/TOTAL GAS SETTING VFR VENT: 90 %
P JIROVECII AG SPEC QL IF: NEGATIVE
P JIROVECII AG SPEC QL IF: NEGATIVE
P JIROVECII DNA SPEC QL NAA+PROBE: NOT DETECTED
P JIROVECII DNA SPEC QL NAA+PROBE: NOT DETECTED
PCO2 BLDA: 100.5 MMHG (ref 26–37)
PCO2 BLDA: 50 MMHG (ref 26–37)
PCO2 BLDA: 52.1 MMHG (ref 26–37)
PCO2 BLDA: 52.9 MMHG (ref 26–37)
PCO2 BLDA: 53.1 MMHG (ref 26–37)
PCO2 BLDA: 56.3 MMHG (ref 26–37)
PCO2 BLDA: 59.3 MMHG (ref 26–37)
PCO2 BLDA: 61.1 MMHG (ref 26–37)
PCO2 BLDA: 61.3 MMHG (ref 26–37)
PCO2 BLDA: 62 MMHG (ref 26–37)
PCO2 BLDA: 62.1 MMHG (ref 26–37)
PCO2 BLDA: 62.2 MMHG (ref 26–37)
PCO2 BLDA: 62.4 MMHG (ref 26–37)
PCO2 BLDA: 62.8 MMHG (ref 26–37)
PCO2 BLDA: 68.1 MMHG (ref 26–37)
PCO2 BLDA: 68.9 MMHG (ref 26–37)
PCO2 BLDA: 71.4 MMHG (ref 26–37)
PCO2 BLDA: 71.6 MMHG (ref 26–37)
PCO2 BLDA: 71.9 MMHG (ref 26–37)
PCO2 BLDA: 73.1 MMHG (ref 26–37)
PCO2 BLDA: 73.2 MMHG (ref 26–37)
PCO2 BLDA: 73.4 MMHG (ref 26–37)
PCO2 BLDA: 73.6 MMHG (ref 26–37)
PCO2 BLDA: 74.6 MMHG (ref 26–37)
PCO2 BLDA: 75.1 MMHG (ref 26–37)
PCO2 BLDA: 76 MMHG (ref 26–37)
PCO2 BLDA: 76.3 MMHG (ref 26–37)
PCO2 BLDA: 76.4 MMHG (ref 26–37)
PCO2 BLDA: 78.8 MMHG (ref 26–37)
PCO2 BLDA: 80.9 MMHG (ref 26–37)
PCO2 BLDA: 81.4 MMHG (ref 26–37)
PCO2 BLDA: 83.6 MMHG (ref 26–37)
PCO2 BLDA: 83.8 MMHG (ref 26–37)
PCO2 BLDA: 84 MMHG (ref 26–37)
PCO2 BLDA: 89 MMHG (ref 26–37)
PCO2 BLDA: 91.5 MMHG (ref 26–37)
PCO2 BLDA: 93.4 MMHG (ref 26–37)
PCO2 BLDA: 93.7 MMHG (ref 26–37)
PCO2 TEMP ADJ BLDA: 49.8 MMHG (ref 26–37)
PCO2 TEMP ADJ BLDA: 50.5 MMHG (ref 26–37)
PCO2 TEMP ADJ BLDA: 50.8 MMHG (ref 26–37)
PCO2 TEMP ADJ BLDA: 51.1 MMHG (ref 26–37)
PCO2 TEMP ADJ BLDA: 55.8 MMHG (ref 26–37)
PCO2 TEMP ADJ BLDA: 56 MMHG (ref 26–37)
PCO2 TEMP ADJ BLDA: 59.7 MMHG (ref 26–37)
PCO2 TEMP ADJ BLDA: 59.9 MMHG (ref 26–37)
PCO2 TEMP ADJ BLDA: 60.4 MMHG (ref 26–37)
PCO2 TEMP ADJ BLDA: 60.7 MMHG (ref 26–37)
PCO2 TEMP ADJ BLDA: 60.8 MMHG (ref 26–37)
PCO2 TEMP ADJ BLDA: 62.2 MMHG (ref 26–37)
PCO2 TEMP ADJ BLDA: 63.3 MMHG (ref 26–37)
PCO2 TEMP ADJ BLDA: 65.5 MMHG (ref 26–37)
PCO2 TEMP ADJ BLDA: 67.4 MMHG (ref 26–37)
PCO2 TEMP ADJ BLDA: 69.1 MMHG (ref 26–37)
PCO2 TEMP ADJ BLDA: 69.9 MMHG (ref 26–37)
PCO2 TEMP ADJ BLDA: 71.2 MMHG (ref 26–37)
PCO2 TEMP ADJ BLDA: 71.3 MMHG (ref 26–37)
PCO2 TEMP ADJ BLDA: 72 MMHG (ref 26–37)
PCO2 TEMP ADJ BLDA: 72.5 MMHG (ref 26–37)
PCO2 TEMP ADJ BLDA: 73.5 MMHG (ref 26–37)
PCO2 TEMP ADJ BLDA: 73.7 MMHG (ref 26–37)
PCO2 TEMP ADJ BLDA: 74.1 MMHG (ref 26–37)
PCO2 TEMP ADJ BLDA: 74.4 MMHG (ref 26–37)
PCO2 TEMP ADJ BLDA: 74.6 MMHG (ref 26–37)
PCO2 TEMP ADJ BLDA: 75.7 MMHG (ref 26–37)
PCO2 TEMP ADJ BLDA: 78.1 MMHG (ref 26–37)
PCO2 TEMP ADJ BLDA: 78.9 MMHG (ref 26–37)
PCO2 TEMP ADJ BLDA: 80.2 MMHG (ref 26–37)
PCO2 TEMP ADJ BLDA: 82.2 MMHG (ref 26–37)
PCO2 TEMP ADJ BLDA: 82.9 MMHG (ref 26–37)
PCO2 TEMP ADJ BLDA: 85.3 MMHG (ref 26–37)
PCO2 TEMP ADJ BLDA: 87.8 MMHG (ref 26–37)
PCO2 TEMP ADJ BLDA: 89.4 MMHG (ref 26–37)
PCO2 TEMP ADJ BLDA: 92.5 MMHG (ref 26–37)
PCO2 TEMP ADJ BLDA: 96.2 MMHG (ref 26–37)
PEEP END EXPIRATORY PRESSURE IPEEP: 10 CMH20
PEEP END EXPIRATORY PRESSURE IPEEP: 12 CMH20
PH BLDA: 7.01 [PH] (ref 7.4–7.5)
PH BLDA: 7.02 [PH] (ref 7.4–7.5)
PH BLDA: 7.07 [PH] (ref 7.4–7.5)
PH BLDA: 7.08 [PH] (ref 7.4–7.5)
PH BLDA: 7.11 [PH] (ref 7.4–7.5)
PH BLDA: 7.11 [PH] (ref 7.4–7.5)
PH BLDA: 7.13 [PH] (ref 7.4–7.5)
PH BLDA: 7.14 [PH] (ref 7.4–7.5)
PH BLDA: 7.14 [PH] (ref 7.4–7.5)
PH BLDA: 7.15 [PH] (ref 7.4–7.5)
PH BLDA: 7.16 [PH] (ref 7.4–7.5)
PH BLDA: 7.17 [PH] (ref 7.4–7.5)
PH BLDA: 7.17 [PH] (ref 7.4–7.5)
PH BLDA: 7.18 [PH] (ref 7.4–7.5)
PH BLDA: 7.19 [PH] (ref 7.4–7.5)
PH BLDA: 7.2 [PH] (ref 7.4–7.5)
PH BLDA: 7.2 [PH] (ref 7.4–7.5)
PH BLDA: 7.21 [PH] (ref 7.4–7.5)
PH BLDA: 7.23 [PH] (ref 7.4–7.5)
PH BLDA: 7.25 [PH] (ref 7.4–7.5)
PH BLDA: 7.25 [PH] (ref 7.4–7.5)
PH BLDA: 7.26 [PH] (ref 7.4–7.5)
PH BLDA: 7.27 [PH] (ref 7.4–7.5)
PH BLDA: 7.28 [PH] (ref 7.4–7.5)
PH BLDA: 7.29 [PH] (ref 7.4–7.5)
PH BLDA: 7.29 [PH] (ref 7.4–7.5)
PH BLDA: 7.3 [PH] (ref 7.4–7.5)
PH BLDA: 7.37 [PH] (ref 7.4–7.5)
PH BLDA: 7.47 [PH] (ref 7.4–7.5)
PH TEMP ADJ BLDA: 7.02 [PH] (ref 7.4–7.5)
PH TEMP ADJ BLDA: 7.04 [PH] (ref 7.4–7.5)
PH TEMP ADJ BLDA: 7.07 [PH] (ref 7.4–7.5)
PH TEMP ADJ BLDA: 7.09 [PH] (ref 7.4–7.5)
PH TEMP ADJ BLDA: 7.12 [PH] (ref 7.4–7.5)
PH TEMP ADJ BLDA: 7.13 [PH] (ref 7.4–7.5)
PH TEMP ADJ BLDA: 7.14 [PH] (ref 7.4–7.5)
PH TEMP ADJ BLDA: 7.15 [PH] (ref 7.4–7.5)
PH TEMP ADJ BLDA: 7.16 [PH] (ref 7.4–7.5)
PH TEMP ADJ BLDA: 7.16 [PH] (ref 7.4–7.5)
PH TEMP ADJ BLDA: 7.18 [PH] (ref 7.4–7.5)
PH TEMP ADJ BLDA: 7.19 [PH] (ref 7.4–7.5)
PH TEMP ADJ BLDA: 7.21 [PH] (ref 7.4–7.5)
PH TEMP ADJ BLDA: 7.22 [PH] (ref 7.4–7.5)
PH TEMP ADJ BLDA: 7.23 [PH] (ref 7.4–7.5)
PH TEMP ADJ BLDA: 7.24 [PH] (ref 7.4–7.5)
PH TEMP ADJ BLDA: 7.25 [PH] (ref 7.4–7.5)
PH TEMP ADJ BLDA: 7.25 [PH] (ref 7.4–7.5)
PH TEMP ADJ BLDA: 7.27 [PH] (ref 7.4–7.5)
PH TEMP ADJ BLDA: 7.28 [PH] (ref 7.4–7.5)
PH TEMP ADJ BLDA: 7.29 [PH] (ref 7.4–7.5)
PH TEMP ADJ BLDA: 7.3 [PH] (ref 7.4–7.5)
PH TEMP ADJ BLDA: 7.39 [PH] (ref 7.4–7.5)
PH TEMP ADJ BLDA: 7.46 [PH] (ref 7.4–7.5)
PHOSPHATE SERPL-MCNC: 12.1 MG/DL (ref 2.5–4.5)
PHOSPHATE SERPL-MCNC: 12.6 MG/DL (ref 2.5–4.5)
PHOSPHATE SERPL-MCNC: 5.2 MG/DL (ref 2.5–4.5)
PHOSPHATE SERPL-MCNC: 5.3 MG/DL (ref 2.5–4.5)
PHOSPHATE SERPL-MCNC: 7.2 MG/DL (ref 2.5–4.5)
PHOSPHATE SERPL-MCNC: 7.4 MG/DL (ref 2.5–4.5)
PIGEON SERUM AB QL ID: NORMAL
PLATELET # BLD AUTO: 130 K/UL (ref 164–446)
PLATELET # BLD AUTO: 167 K/UL (ref 164–446)
PLATELET # BLD AUTO: 173 K/UL (ref 164–446)
PLATELET # BLD AUTO: 204 K/UL (ref 164–446)
PLATELET # BLD AUTO: 216 K/UL (ref 164–446)
PLATELET # BLD AUTO: 222 K/UL (ref 164–446)
PLATELET # BLD AUTO: 231 K/UL (ref 164–446)
PLATELET # BLD AUTO: 242 K/UL (ref 164–446)
PLATELET # BLD AUTO: 271 K/UL (ref 164–446)
PLATELET # BLD AUTO: 299 K/UL (ref 164–446)
PLATELET # BLD AUTO: 302 K/UL (ref 164–446)
PLATELET # BLD AUTO: 307 K/UL (ref 164–446)
PLATELET # BLD AUTO: 308 K/UL (ref 164–446)
PMV BLD AUTO: 10 FL (ref 9–12.9)
PMV BLD AUTO: 10.1 FL (ref 9–12.9)
PMV BLD AUTO: 10.1 FL (ref 9–12.9)
PMV BLD AUTO: 10.3 FL (ref 9–12.9)
PMV BLD AUTO: 10.3 FL (ref 9–12.9)
PMV BLD AUTO: 10.5 FL (ref 9–12.9)
PMV BLD AUTO: 10.6 FL (ref 9–12.9)
PMV BLD AUTO: 10.6 FL (ref 9–12.9)
PMV BLD AUTO: 10.9 FL (ref 9–12.9)
PMV BLD AUTO: 11.2 FL (ref 9–12.9)
PMV BLD AUTO: 11.2 FL (ref 9–12.9)
PMV BLD AUTO: 9.9 FL (ref 9–12.9)
PMV BLD AUTO: 9.9 FL (ref 9–12.9)
PO2 BLDA: 101 MMHG (ref 64–87)
PO2 BLDA: 104 MMHG (ref 64–87)
PO2 BLDA: 107 MMHG (ref 64–87)
PO2 BLDA: 112 MMHG (ref 64–87)
PO2 BLDA: 114 MMHG (ref 64–87)
PO2 BLDA: 116 MMHG (ref 64–87)
PO2 BLDA: 120 MMHG (ref 64–87)
PO2 BLDA: 146 MMHG (ref 64–87)
PO2 BLDA: 52 MMHG (ref 64–87)
PO2 BLDA: 54 MMHG (ref 64–87)
PO2 BLDA: 57 MMHG (ref 64–87)
PO2 BLDA: 59 MMHG (ref 64–87)
PO2 BLDA: 63 MMHG (ref 64–87)
PO2 BLDA: 64 MMHG (ref 64–87)
PO2 BLDA: 65 MMHG (ref 64–87)
PO2 BLDA: 66 MMHG (ref 64–87)
PO2 BLDA: 68 MMHG (ref 64–87)
PO2 BLDA: 68 MMHG (ref 64–87)
PO2 BLDA: 72 MMHG (ref 64–87)
PO2 BLDA: 75 MMHG (ref 64–87)
PO2 BLDA: 76.2 MMHG (ref 64–87)
PO2 BLDA: 77 MMHG (ref 64–87)
PO2 BLDA: 78 MMHG (ref 64–87)
PO2 BLDA: 78.4 MMHG (ref 64–87)
PO2 BLDA: 79 MMHG (ref 64–87)
PO2 BLDA: 80 MMHG (ref 64–87)
PO2 BLDA: 80 MMHG (ref 64–87)
PO2 BLDA: 80.6 MMHG (ref 64–87)
PO2 BLDA: 83 MMHG (ref 64–87)
PO2 BLDA: 86 MMHG (ref 64–87)
PO2 BLDA: 88 MMHG (ref 64–87)
PO2 BLDA: 88 MMHG (ref 64–87)
PO2 BLDA: 89 MMHG (ref 64–87)
PO2 BLDA: 90 MMHG (ref 64–87)
PO2 BLDA: 91 MMHG (ref 64–87)
PO2 BLDA: 92.3 MMHG (ref 64–87)
PO2 BLDA: 93 MMHG (ref 64–87)
PO2 BLDA: 96 MMHG (ref 64–87)
PO2 TEMP ADJ BLDA: 101 MMHG (ref 64–87)
PO2 TEMP ADJ BLDA: 104 MMHG (ref 64–87)
PO2 TEMP ADJ BLDA: 107 MMHG (ref 64–87)
PO2 TEMP ADJ BLDA: 107 MMHG (ref 64–87)
PO2 TEMP ADJ BLDA: 112 MMHG (ref 64–87)
PO2 TEMP ADJ BLDA: 123 MMHG (ref 64–87)
PO2 TEMP ADJ BLDA: 140 MMHG (ref 64–87)
PO2 TEMP ADJ BLDA: 52 MMHG (ref 64–87)
PO2 TEMP ADJ BLDA: 52 MMHG (ref 64–87)
PO2 TEMP ADJ BLDA: 53 MMHG (ref 64–87)
PO2 TEMP ADJ BLDA: 56 MMHG (ref 64–87)
PO2 TEMP ADJ BLDA: 60 MMHG (ref 64–87)
PO2 TEMP ADJ BLDA: 62 MMHG (ref 64–87)
PO2 TEMP ADJ BLDA: 62 MMHG (ref 64–87)
PO2 TEMP ADJ BLDA: 63 MMHG (ref 64–87)
PO2 TEMP ADJ BLDA: 64 MMHG (ref 64–87)
PO2 TEMP ADJ BLDA: 67 MMHG (ref 64–87)
PO2 TEMP ADJ BLDA: 73.4 MMHG (ref 64–87)
PO2 TEMP ADJ BLDA: 73.7 MMHG (ref 64–87)
PO2 TEMP ADJ BLDA: 74 MMHG (ref 64–87)
PO2 TEMP ADJ BLDA: 74 MMHG (ref 64–87)
PO2 TEMP ADJ BLDA: 75 MMHG (ref 64–87)
PO2 TEMP ADJ BLDA: 75.5 MMHG (ref 64–87)
PO2 TEMP ADJ BLDA: 76 MMHG (ref 64–87)
PO2 TEMP ADJ BLDA: 76 MMHG (ref 64–87)
PO2 TEMP ADJ BLDA: 77 MMHG (ref 64–87)
PO2 TEMP ADJ BLDA: 79 MMHG (ref 64–87)
PO2 TEMP ADJ BLDA: 82 MMHG (ref 64–87)
PO2 TEMP ADJ BLDA: 84 MMHG (ref 64–87)
PO2 TEMP ADJ BLDA: 86 MMHG (ref 64–87)
PO2 TEMP ADJ BLDA: 86.7 MMHG (ref 64–87)
PO2 TEMP ADJ BLDA: 87 MMHG (ref 64–87)
PO2 TEMP ADJ BLDA: 88 MMHG (ref 64–87)
PO2 TEMP ADJ BLDA: 88 MMHG (ref 64–87)
PO2 TEMP ADJ BLDA: 92 MMHG (ref 64–87)
PO2 TEMP ADJ BLDA: 95 MMHG (ref 64–87)
PO2 TEMP ADJ BLDA: 96 MMHG (ref 64–87)
POTASSIUM SERPL-SCNC: 3.5 MMOL/L (ref 3.6–5.5)
POTASSIUM SERPL-SCNC: 3.8 MMOL/L (ref 3.6–5.5)
POTASSIUM SERPL-SCNC: 3.8 MMOL/L (ref 3.6–5.5)
POTASSIUM SERPL-SCNC: 4.1 MMOL/L (ref 3.6–5.5)
POTASSIUM SERPL-SCNC: 4.1 MMOL/L (ref 3.6–5.5)
POTASSIUM SERPL-SCNC: 4.2 MMOL/L (ref 3.6–5.5)
POTASSIUM SERPL-SCNC: 4.4 MMOL/L (ref 3.6–5.5)
POTASSIUM SERPL-SCNC: 4.4 MMOL/L (ref 3.6–5.5)
POTASSIUM SERPL-SCNC: 4.5 MMOL/L (ref 3.6–5.5)
POTASSIUM SERPL-SCNC: 4.5 MMOL/L (ref 3.6–5.5)
POTASSIUM SERPL-SCNC: 4.6 MMOL/L (ref 3.6–5.5)
POTASSIUM SERPL-SCNC: 4.6 MMOL/L (ref 3.6–5.5)
POTASSIUM SERPL-SCNC: 4.7 MMOL/L (ref 3.6–5.5)
POTASSIUM SERPL-SCNC: 4.8 MMOL/L (ref 3.6–5.5)
POTASSIUM SERPL-SCNC: 4.9 MMOL/L (ref 3.6–5.5)
POTASSIUM SERPL-SCNC: 5.9 MMOL/L (ref 3.6–5.5)
POTASSIUM SERPL-SCNC: 6.2 MMOL/L (ref 3.6–5.5)
POTASSIUM SERPL-SCNC: 6.3 MMOL/L (ref 3.6–5.5)
PREALB SERPL-MCNC: 25.6 MG/DL (ref 18–38)
PREALB SERPL-MCNC: 26.2 MG/DL (ref 18–38)
PROCALCITONIN SERPL-MCNC: 0.08 NG/ML
PROCALCITONIN SERPL-MCNC: 0.22 NG/ML
PROCALCITONIN SERPL-MCNC: 2.51 NG/ML
PROT SERPL-MCNC: 5.5 G/DL (ref 6–8.2)
PROT SERPL-MCNC: 5.7 G/DL (ref 6–8.2)
PROT SERPL-MCNC: 6 G/DL (ref 6–8.2)
PROT SERPL-MCNC: 6.1 G/DL (ref 6–8.2)
PROT SERPL-MCNC: 6.2 G/DL (ref 6–8.2)
PROT SERPL-MCNC: 6.4 G/DL (ref 6–8.2)
PROT SERPL-MCNC: 6.5 G/DL (ref 6–8.2)
PROT SERPL-MCNC: 6.9 G/DL (ref 6–8.2)
PROT SERPL-MCNC: 6.9 G/DL (ref 6–8.2)
PROT SERPL-MCNC: 7.1 G/DL (ref 6–8.2)
PROTEINASE3 AB SER-ACNC: 0 AU/ML (ref 0–19)
PROTEINASE3 AB SER-ACNC: 1 AU/ML (ref 0–19)
RBC # BLD AUTO: 4.6 M/UL (ref 4.7–6.1)
RBC # BLD AUTO: 4.72 M/UL (ref 4.7–6.1)
RBC # BLD AUTO: 4.82 M/UL (ref 4.7–6.1)
RBC # BLD AUTO: 5.08 M/UL (ref 4.7–6.1)
RBC # BLD AUTO: 5.08 M/UL (ref 4.7–6.1)
RBC # BLD AUTO: 5.1 M/UL (ref 4.7–6.1)
RBC # BLD AUTO: 5.13 M/UL (ref 4.7–6.1)
RBC # BLD AUTO: 5.15 M/UL (ref 4.7–6.1)
RBC # BLD AUTO: 5.33 M/UL (ref 4.7–6.1)
RBC # BLD AUTO: 5.38 M/UL (ref 4.7–6.1)
RBC # BLD AUTO: 5.48 M/UL (ref 4.7–6.1)
RBC # BLD AUTO: 5.49 M/UL (ref 4.7–6.1)
RBC # BLD AUTO: 5.75 M/UL (ref 4.7–6.1)
RHEUMATOID FACT SER NEPH-ACNC: <10 IU/ML (ref 0–14)
RHODAMINE-AURAMINE STN SPEC: NORMAL
RIBOSOMAL P AB SER-ACNC: 3 AU/ML (ref 0–40)
RSV RNA NPH QL NAA+NON-PROBE: NOT DETECTED
RSV RNA SPEC QL NAA+PROBE: NEGATIVE
RV+EV RNA NPH QL NAA+NON-PROBE: NOT DETECTED
S PNEUM AG UR QL: NEGATIVE
S RECTIVIRGULA AB SER QL ID: NORMAL
S VIRIDIS AB SER QL: NORMAL
SAO2 % BLDA: 78 % (ref 93–99)
SAO2 % BLDA: 81 % (ref 93–99)
SAO2 % BLDA: 84 % (ref 93–99)
SAO2 % BLDA: 85 % (ref 93–99)
SAO2 % BLDA: 86 % (ref 93–99)
SAO2 % BLDA: 88 % (ref 93–99)
SAO2 % BLDA: 89 % (ref 93–99)
SAO2 % BLDA: 89 % (ref 93–99)
SAO2 % BLDA: 90 % (ref 93–99)
SAO2 % BLDA: 91 % (ref 93–99)
SAO2 % BLDA: 92 % (ref 93–99)
SAO2 % BLDA: 92.2 % (ref 93–99)
SAO2 % BLDA: 93 % (ref 93–99)
SAO2 % BLDA: 93.1 % (ref 93–99)
SAO2 % BLDA: 93.6 % (ref 93–99)
SAO2 % BLDA: 94 % (ref 93–99)
SAO2 % BLDA: 95 % (ref 93–99)
SAO2 % BLDA: 95 % (ref 93–99)
SAO2 % BLDA: 95.7 % (ref 93–99)
SAO2 % BLDA: 96 % (ref 93–99)
SAO2 % BLDA: 97 % (ref 93–99)
SAO2 % BLDA: 97 % (ref 93–99)
SAO2 % BLDA: 98 % (ref 93–99)
SAO2 % BLDA: 99 % (ref 93–99)
SARS-COV-2 RNA NPH QL NAA+NON-PROBE: NOTDETECTED
SARS-COV-2 RNA RESP QL NAA+PROBE: NOTDETECTED
SCCMEC + MECA PNL NOSE NAA+PROBE: NEGATIVE
SIGNIFICANT IND 70042: ABNORMAL
SIGNIFICANT IND 70042: NORMAL
SITE SITE: ABNORMAL
SITE SITE: NORMAL
SODIUM SERPL-SCNC: 134 MMOL/L (ref 135–145)
SODIUM SERPL-SCNC: 135 MMOL/L (ref 135–145)
SODIUM SERPL-SCNC: 136 MMOL/L (ref 135–145)
SODIUM SERPL-SCNC: 137 MMOL/L (ref 135–145)
SODIUM SERPL-SCNC: 137 MMOL/L (ref 135–145)
SODIUM SERPL-SCNC: 138 MMOL/L (ref 135–145)
SODIUM SERPL-SCNC: 139 MMOL/L (ref 135–145)
SODIUM SERPL-SCNC: 139 MMOL/L (ref 135–145)
SODIUM SERPL-SCNC: 140 MMOL/L (ref 135–145)
SODIUM SERPL-SCNC: 141 MMOL/L (ref 135–145)
SODIUM SERPL-SCNC: 142 MMOL/L (ref 135–145)
SODIUM SERPL-SCNC: 142 MMOL/L (ref 135–145)
SOURCE SOURCE: ABNORMAL
SOURCE SOURCE: NORMAL
SPECIMEN DRAWN FROM PATIENT: ABNORMAL
SPECIMEN SOURCE: NORMAL
SSA52 R0ENA AB IGG Q0420: 3 AU/ML (ref 0–40)
SSA60 R0ENA AB IGG Q0419: 1 AU/ML (ref 0–40)
T CANDIDUS AB SER QL: NORMAL
TIDAL VOLUME IVT: 320 ML
TIDAL VOLUME IVT: 350 ML
TIDAL VOLUME IVT: 370 ML
TIDAL VOLUME IVT: 390 ML
TIDAL VOLUME IVT: 400 ML
TRIGL SERPL-MCNC: 184 MG/DL (ref 0–149)
TRIGL SERPL-MCNC: 255 MG/DL (ref 0–149)
TRIGL SERPL-MCNC: 256 MG/DL (ref 0–149)
TRIGL SERPL-MCNC: 295 MG/DL (ref 0–149)
TRIGL SERPL-MCNC: 393 MG/DL (ref 0–149)
U1 SNRNP IGG SER QL: 2 UNITS (ref 0–19)
WBC # BLD AUTO: 15.1 K/UL (ref 4.8–10.8)
WBC # BLD AUTO: 15.1 K/UL (ref 4.8–10.8)
WBC # BLD AUTO: 15.7 K/UL (ref 4.8–10.8)
WBC # BLD AUTO: 16 K/UL (ref 4.8–10.8)
WBC # BLD AUTO: 16.1 K/UL (ref 4.8–10.8)
WBC # BLD AUTO: 18.4 K/UL (ref 4.8–10.8)
WBC # BLD AUTO: 20.6 K/UL (ref 4.8–10.8)
WBC # BLD AUTO: 21.2 K/UL (ref 4.8–10.8)
WBC # BLD AUTO: 25.2 K/UL (ref 4.8–10.8)
WBC # BLD AUTO: 25.3 K/UL (ref 4.8–10.8)
WBC # BLD AUTO: 27.5 K/UL (ref 4.8–10.8)
WBC # BLD AUTO: 30.9 K/UL (ref 4.8–10.8)
WBC # BLD AUTO: 33.4 K/UL (ref 4.8–10.8)

## 2023-01-01 PROCEDURE — 302978 HCHG BRONCHOSCOPY-DIAGNOSTIC

## 2023-01-01 PROCEDURE — A9270 NON-COVERED ITEM OR SERVICE: HCPCS | Performed by: STUDENT IN AN ORGANIZED HEALTH CARE EDUCATION/TRAINING PROGRAM

## 2023-01-01 PROCEDURE — 86331 IMMUNODIFFUSION OUCHTERLONY: CPT | Mod: 91

## 2023-01-01 PROCEDURE — 87186 SC STD MICRODIL/AGAR DIL: CPT

## 2023-01-01 PROCEDURE — 700111 HCHG RX REV CODE 636 W/ 250 OVERRIDE (IP): Performed by: INTERNAL MEDICINE

## 2023-01-01 PROCEDURE — 700105 HCHG RX REV CODE 258: Performed by: INTERNAL MEDICINE

## 2023-01-01 PROCEDURE — 770022 HCHG ROOM/CARE - ICU (200)

## 2023-01-01 PROCEDURE — 83605 ASSAY OF LACTIC ACID: CPT

## 2023-01-01 PROCEDURE — 83516 IMMUNOASSAY NONANTIBODY: CPT | Mod: 91

## 2023-01-01 PROCEDURE — 82803 BLOOD GASES ANY COMBINATION: CPT | Mod: 91

## 2023-01-01 PROCEDURE — 700101 HCHG RX REV CODE 250: Performed by: INTERNAL MEDICINE

## 2023-01-01 PROCEDURE — 85025 COMPLETE CBC W/AUTO DIFF WBC: CPT

## 2023-01-01 PROCEDURE — 94660 CPAP INITIATION&MGMT: CPT

## 2023-01-01 PROCEDURE — 700102 HCHG RX REV CODE 250 W/ 637 OVERRIDE(OP): Performed by: INTERNAL MEDICINE

## 2023-01-01 PROCEDURE — 85652 RBC SED RATE AUTOMATED: CPT

## 2023-01-01 PROCEDURE — 99291 CRITICAL CARE FIRST HOUR: CPT | Mod: GC | Performed by: INTERNAL MEDICINE

## 2023-01-01 PROCEDURE — 94640 AIRWAY INHALATION TREATMENT: CPT

## 2023-01-01 PROCEDURE — 99292 CRITICAL CARE ADDL 30 MIN: CPT | Mod: 25 | Performed by: INTERNAL MEDICINE

## 2023-01-01 PROCEDURE — 87798 DETECT AGENT NOS DNA AMP: CPT

## 2023-01-01 PROCEDURE — 94760 N-INVAS EAR/PLS OXIMETRY 1: CPT

## 2023-01-01 PROCEDURE — 86606 ASPERGILLUS ANTIBODY: CPT | Mod: 91

## 2023-01-01 PROCEDURE — 84478 ASSAY OF TRIGLYCERIDES: CPT

## 2023-01-01 PROCEDURE — 71045 X-RAY EXAM CHEST 1 VIEW: CPT

## 2023-01-01 PROCEDURE — A9270 NON-COVERED ITEM OR SERVICE: HCPCS | Performed by: INTERNAL MEDICINE

## 2023-01-01 PROCEDURE — 94003 VENT MGMT INPAT SUBQ DAY: CPT

## 2023-01-01 PROCEDURE — 99222 1ST HOSP IP/OBS MODERATE 55: CPT | Performed by: INTERNAL MEDICINE

## 2023-01-01 PROCEDURE — 99292 CRITICAL CARE ADDL 30 MIN: CPT | Mod: 25,GC | Performed by: INTERNAL MEDICINE

## 2023-01-01 PROCEDURE — 700102 HCHG RX REV CODE 250 W/ 637 OVERRIDE(OP): Performed by: STUDENT IN AN ORGANIZED HEALTH CARE EDUCATION/TRAINING PROGRAM

## 2023-01-01 PROCEDURE — 86160 COMPLEMENT ANTIGEN: CPT

## 2023-01-01 PROCEDURE — 0BJ08ZZ INSPECTION OF TRACHEOBRONCHIAL TREE, VIA NATURAL OR ARTIFICIAL OPENING ENDOSCOPIC: ICD-10-PCS | Performed by: INTERNAL MEDICINE

## 2023-01-01 PROCEDURE — 700111 HCHG RX REV CODE 636 W/ 250 OVERRIDE (IP): Performed by: STUDENT IN AN ORGANIZED HEALTH CARE EDUCATION/TRAINING PROGRAM

## 2023-01-01 PROCEDURE — 86360 T CELL ABSOLUTE COUNT/RATIO: CPT

## 2023-01-01 PROCEDURE — 87633 RESP VIRUS 12-25 TARGETS: CPT

## 2023-01-01 PROCEDURE — 82785 ASSAY OF IGE: CPT

## 2023-01-01 PROCEDURE — 83735 ASSAY OF MAGNESIUM: CPT

## 2023-01-01 PROCEDURE — 94799 UNLISTED PULMONARY SVC/PX: CPT

## 2023-01-01 PROCEDURE — 82962 GLUCOSE BLOOD TEST: CPT

## 2023-01-01 PROCEDURE — 700105 HCHG RX REV CODE 258: Performed by: STUDENT IN AN ORGANIZED HEALTH CARE EDUCATION/TRAINING PROGRAM

## 2023-01-01 PROCEDURE — 87206 SMEAR FLUORESCENT/ACID STAI: CPT

## 2023-01-01 PROCEDURE — 86140 C-REACTIVE PROTEIN: CPT

## 2023-01-01 PROCEDURE — 80048 BASIC METABOLIC PNL TOTAL CA: CPT | Mod: 91

## 2023-01-01 PROCEDURE — C1751 CATH, INF, PER/CENT/MIDLINE: HCPCS

## 2023-01-01 PROCEDURE — 87070 CULTURE OTHR SPECIMN AEROBIC: CPT

## 2023-01-01 PROCEDURE — 87581 M.PNEUMON DNA AMP PROBE: CPT

## 2023-01-01 PROCEDURE — 80048 BASIC METABOLIC PNL TOTAL CA: CPT

## 2023-01-01 PROCEDURE — 82962 GLUCOSE BLOOD TEST: CPT | Mod: 91

## 2023-01-01 PROCEDURE — 87040 BLOOD CULTURE FOR BACTERIA: CPT

## 2023-01-01 PROCEDURE — 94002 VENT MGMT INPAT INIT DAY: CPT

## 2023-01-01 PROCEDURE — 99232 SBSQ HOSP IP/OBS MODERATE 35: CPT | Performed by: INTERNAL MEDICINE

## 2023-01-01 PROCEDURE — 302977 HCHG BRONCHOSCOPY PROC-THERAPEUTIC

## 2023-01-01 PROCEDURE — 86606 ASPERGILLUS ANTIBODY: CPT

## 2023-01-01 PROCEDURE — 80053 COMPREHEN METABOLIC PANEL: CPT

## 2023-01-01 PROCEDURE — 37799 UNLISTED PX VASCULAR SURGERY: CPT

## 2023-01-01 PROCEDURE — 86738 MYCOPLASMA ANTIBODY: CPT

## 2023-01-01 PROCEDURE — 99291 CRITICAL CARE FIRST HOUR: CPT | Mod: 25 | Performed by: INTERNAL MEDICINE

## 2023-01-01 PROCEDURE — 82803 BLOOD GASES ANY COMBINATION: CPT

## 2023-01-01 PROCEDURE — 302307 BAG FECAL MANAGEMENT TEMPORARY COLLECTION WITH FILTER - SEAL SIGNAL FMS: Performed by: INTERNAL MEDICINE

## 2023-01-01 PROCEDURE — 87077 CULTURE AEROBIC IDENTIFY: CPT

## 2023-01-01 PROCEDURE — 82550 ASSAY OF CK (CPK): CPT

## 2023-01-01 PROCEDURE — 99291 CRITICAL CARE FIRST HOUR: CPT | Mod: 25,GC | Performed by: INTERNAL MEDICINE

## 2023-01-01 PROCEDURE — 82085 ASSAY OF ALDOLASE: CPT

## 2023-01-01 PROCEDURE — 84145 PROCALCITONIN (PCT): CPT

## 2023-01-01 PROCEDURE — 90935 HEMODIALYSIS ONE EVALUATION: CPT

## 2023-01-01 PROCEDURE — 87899 AGENT NOS ASSAY W/OPTIC: CPT

## 2023-01-01 PROCEDURE — 700101 HCHG RX REV CODE 250

## 2023-01-01 PROCEDURE — 93306 TTE W/DOPPLER COMPLETE: CPT

## 2023-01-01 PROCEDURE — G0475 HIV COMBINATION ASSAY: HCPCS

## 2023-01-01 PROCEDURE — 87040 BLOOD CULTURE FOR BACTERIA: CPT | Mod: 91

## 2023-01-01 PROCEDURE — 99152 MOD SED SAME PHYS/QHP 5/>YRS: CPT

## 2023-01-01 PROCEDURE — 700111 HCHG RX REV CODE 636 W/ 250 OVERRIDE (IP)

## 2023-01-01 PROCEDURE — 84134 ASSAY OF PREALBUMIN: CPT

## 2023-01-01 PROCEDURE — 36556 INSERT NON-TUNNEL CV CATH: CPT | Mod: LT | Performed by: INTERNAL MEDICINE

## 2023-01-01 PROCEDURE — C9803 HOPD COVID-19 SPEC COLLECT: HCPCS | Performed by: STUDENT IN AN ORGANIZED HEALTH CARE EDUCATION/TRAINING PROGRAM

## 2023-01-01 PROCEDURE — 86431 RHEUMATOID FACTOR QUANT: CPT

## 2023-01-01 PROCEDURE — 83880 ASSAY OF NATRIURETIC PEPTIDE: CPT

## 2023-01-01 PROCEDURE — 36556 INSERT NON-TUNNEL CV CATH: CPT | Mod: RT,GC | Performed by: INTERNAL MEDICINE

## 2023-01-01 PROCEDURE — 87281 PNEUMOCYSTIS CARINII AG IF: CPT

## 2023-01-01 PROCEDURE — 83605 ASSAY OF LACTIC ACID: CPT | Mod: 91

## 2023-01-01 PROCEDURE — 83516 IMMUNOASSAY NONANTIBODY: CPT

## 2023-01-01 PROCEDURE — 36556 INSERT NON-TUNNEL CV CATH: CPT

## 2023-01-01 PROCEDURE — 87486 CHLMYD PNEUM DNA AMP PROBE: CPT

## 2023-01-01 PROCEDURE — 86235 NUCLEAR ANTIGEN ANTIBODY: CPT | Mod: 91

## 2023-01-01 PROCEDURE — 99292 CRITICAL CARE ADDL 30 MIN: CPT | Mod: GC | Performed by: INTERNAL MEDICINE

## 2023-01-01 PROCEDURE — 86200 CCP ANTIBODY: CPT

## 2023-01-01 PROCEDURE — 36600 WITHDRAWAL OF ARTERIAL BLOOD: CPT

## 2023-01-01 PROCEDURE — C1752 CATH,HEMODIALYSIS,SHORT-TERM: HCPCS

## 2023-01-01 PROCEDURE — 87641 MR-STAPH DNA AMP PROBE: CPT

## 2023-01-01 PROCEDURE — 0BH18EZ INSERTION OF ENDOTRACHEAL AIRWAY INTO TRACHEA, VIA NATURAL OR ARTIFICIAL OPENING ENDOSCOPIC: ICD-10-PCS | Performed by: INTERNAL MEDICINE

## 2023-01-01 PROCEDURE — 84100 ASSAY OF PHOSPHORUS: CPT

## 2023-01-01 PROCEDURE — 99153 MOD SED SAME PHYS/QHP EA: CPT

## 2023-01-01 PROCEDURE — 86331 IMMUNODIFFUSION OUCHTERLONY: CPT

## 2023-01-01 PROCEDURE — 86359 T CELLS TOTAL COUNT: CPT

## 2023-01-01 PROCEDURE — 87305 ASPERGILLUS AG IA: CPT

## 2023-01-01 PROCEDURE — 36620 INSERTION CATHETER ARTERY: CPT

## 2023-01-01 PROCEDURE — 5A1955Z RESPIRATORY VENTILATION, GREATER THAN 96 CONSECUTIVE HOURS: ICD-10-PCS | Performed by: INTERNAL MEDICINE

## 2023-01-01 PROCEDURE — 97602 WOUND(S) CARE NON-SELECTIVE: CPT

## 2023-01-01 PROCEDURE — 93306 TTE W/DOPPLER COMPLETE: CPT | Mod: 26 | Performed by: INTERNAL MEDICINE

## 2023-01-01 PROCEDURE — 90935 HEMODIALYSIS ONE EVALUATION: CPT | Performed by: INTERNAL MEDICINE

## 2023-01-01 PROCEDURE — 99231 SBSQ HOSP IP/OBS SF/LOW 25: CPT | Performed by: INTERNAL MEDICINE

## 2023-01-01 PROCEDURE — 87102 FUNGUS ISOLATION CULTURE: CPT

## 2023-01-01 PROCEDURE — 31624 DX BRONCHOSCOPE/LAVAGE: CPT | Performed by: INTERNAL MEDICINE

## 2023-01-01 PROCEDURE — 87015 SPECIMEN INFECT AGNT CONCNTJ: CPT

## 2023-01-01 PROCEDURE — 5A1D70Z PERFORMANCE OF URINARY FILTRATION, INTERMITTENT, LESS THAN 6 HOURS PER DAY: ICD-10-PCS | Performed by: INTERNAL MEDICINE

## 2023-01-01 PROCEDURE — 0B9D8ZX DRAINAGE OF RIGHT MIDDLE LUNG LOBE, VIA NATURAL OR ARTIFICIAL OPENING ENDOSCOPIC, DIAGNOSTIC: ICD-10-PCS | Performed by: INTERNAL MEDICINE

## 2023-01-01 PROCEDURE — 02HV33Z INSERTION OF INFUSION DEVICE INTO SUPERIOR VENA CAVA, PERCUTANEOUS APPROACH: ICD-10-PCS | Performed by: INTERNAL MEDICINE

## 2023-01-01 PROCEDURE — 36620 INSERTION CATHETER ARTERY: CPT | Mod: GC | Performed by: INTERNAL MEDICINE

## 2023-01-01 PROCEDURE — 03HY32Z INSERTION OF MONITORING DEVICE INTO UPPER ARTERY, PERCUTANEOUS APPROACH: ICD-10-PCS | Performed by: INTERNAL MEDICINE

## 2023-01-01 PROCEDURE — 31500 INSERT EMERGENCY AIRWAY: CPT | Performed by: INTERNAL MEDICINE

## 2023-01-01 PROCEDURE — 700101 HCHG RX REV CODE 250: Performed by: STUDENT IN AN ORGANIZED HEALTH CARE EDUCATION/TRAINING PROGRAM

## 2023-01-01 PROCEDURE — 86635 COCCIDIOIDES ANTIBODY: CPT | Mod: 91

## 2023-01-01 PROCEDURE — 88112 CYTOPATH CELL ENHANCE TECH: CPT

## 2023-01-01 PROCEDURE — 31645 BRNCHSC W/THER ASPIR 1ST: CPT | Performed by: INTERNAL MEDICINE

## 2023-01-01 PROCEDURE — 93010 ELECTROCARDIOGRAM REPORT: CPT | Performed by: INTERNAL MEDICINE

## 2023-01-01 PROCEDURE — 84478 ASSAY OF TRIGLYCERIDES: CPT | Mod: 91

## 2023-01-01 PROCEDURE — 88305 TISSUE EXAM BY PATHOLOGIST: CPT

## 2023-01-01 PROCEDURE — 86038 ANTINUCLEAR ANTIBODIES: CPT

## 2023-01-01 PROCEDURE — 87205 SMEAR GRAM STAIN: CPT

## 2023-01-01 PROCEDURE — 31500 INSERT EMERGENCY AIRWAY: CPT

## 2023-01-01 PROCEDURE — 0241U HCHG SARS-COV-2 COVID-19 NFCT DS RESP RNA 4 TRGT MIC: CPT

## 2023-01-01 PROCEDURE — 87449 NOS EACH ORGANISM AG IA: CPT

## 2023-01-01 PROCEDURE — 83036 HEMOGLOBIN GLYCOSYLATED A1C: CPT

## 2023-01-01 PROCEDURE — 71250 CT THORAX DX C-: CPT

## 2023-01-01 PROCEDURE — 87116 MYCOBACTERIA CULTURE: CPT

## 2023-01-01 PROCEDURE — 93005 ELECTROCARDIOGRAM TRACING: CPT | Performed by: INTERNAL MEDICINE

## 2023-01-01 PROCEDURE — 87205 SMEAR GRAM STAIN: CPT | Mod: 91

## 2023-01-01 PROCEDURE — 80074 ACUTE HEPATITIS PANEL: CPT

## 2023-01-01 PROCEDURE — 86706 HEP B SURFACE ANTIBODY: CPT

## 2023-01-01 PROCEDURE — 36620 INSERTION CATHETER ARTERY: CPT | Performed by: INTERNAL MEDICINE

## 2023-01-01 PROCEDURE — 89240 UNLISTED MISC PATH TEST: CPT

## 2023-01-01 PROCEDURE — 99233 SBSQ HOSP IP/OBS HIGH 50: CPT | Performed by: INTERNAL MEDICINE

## 2023-01-01 RX ORDER — AMOXICILLIN 250 MG
2 CAPSULE ORAL 2 TIMES DAILY
Status: DISCONTINUED | OUTPATIENT
Start: 2023-01-01 | End: 2023-01-01

## 2023-01-01 RX ORDER — VECURONIUM BROMIDE 1 MG/ML
0.1 INJECTION, POWDER, LYOPHILIZED, FOR SOLUTION INTRAVENOUS ONCE
Status: COMPLETED | OUTPATIENT
Start: 2023-01-01 | End: 2023-01-01

## 2023-01-01 RX ORDER — MIDAZOLAM HYDROCHLORIDE 1 MG/ML
5 INJECTION INTRAMUSCULAR; INTRAVENOUS ONCE
Status: COMPLETED | OUTPATIENT
Start: 2023-01-01 | End: 2023-01-01

## 2023-01-01 RX ORDER — HEPARIN SODIUM 5000 [USP'U]/ML
5000 INJECTION, SOLUTION INTRAVENOUS; SUBCUTANEOUS EVERY 8 HOURS
Status: DISCONTINUED | OUTPATIENT
Start: 2023-01-01 | End: 2023-01-01

## 2023-01-01 RX ORDER — ACETYLCYSTEINE 200 MG/ML
3 SOLUTION ORAL; RESPIRATORY (INHALATION) EVERY 4 HOURS
Status: DISCONTINUED | OUTPATIENT
Start: 2023-01-01 | End: 2023-01-01

## 2023-01-01 RX ORDER — POTASSIUM CHLORIDE 20 MEQ/1
40 TABLET, EXTENDED RELEASE ORAL ONCE
Status: COMPLETED | OUTPATIENT
Start: 2023-01-01 | End: 2023-01-01

## 2023-01-01 RX ORDER — LORAZEPAM 2 MG/ML
1 CONCENTRATE ORAL
Status: DISCONTINUED | OUTPATIENT
Start: 2023-01-01 | End: 2023-01-01 | Stop reason: HOSPADM

## 2023-01-01 RX ORDER — ONDANSETRON 4 MG/1
8 TABLET, ORALLY DISINTEGRATING ORAL EVERY 8 HOURS PRN
Status: DISCONTINUED | OUTPATIENT
Start: 2023-01-01 | End: 2023-01-01 | Stop reason: HOSPADM

## 2023-01-01 RX ORDER — METHYLPREDNISOLONE SODIUM SUCCINATE 125 MG/2ML
62.5 INJECTION, POWDER, LYOPHILIZED, FOR SOLUTION INTRAMUSCULAR; INTRAVENOUS EVERY 6 HOURS
Status: DISCONTINUED | OUTPATIENT
Start: 2023-01-01 | End: 2023-01-01

## 2023-01-01 RX ORDER — BENAZEPRIL HYDROCHLORIDE 20 MG/1
40 TABLET ORAL DAILY
Status: DISCONTINUED | OUTPATIENT
Start: 2023-01-01 | End: 2023-01-01

## 2023-01-01 RX ORDER — BENAZEPRIL HYDROCHLORIDE 20 MG/1
40 TABLET ORAL DAILY
COMMUNITY

## 2023-01-01 RX ORDER — FUROSEMIDE 10 MG/ML
80 INJECTION INTRAMUSCULAR; INTRAVENOUS 4 TIMES DAILY
Status: DISCONTINUED | OUTPATIENT
Start: 2023-01-01 | End: 2023-01-01

## 2023-01-01 RX ORDER — VECURONIUM BROMIDE 1 MG/ML
0.1 INJECTION, POWDER, LYOPHILIZED, FOR SOLUTION INTRAVENOUS
Status: DISCONTINUED | OUTPATIENT
Start: 2023-01-01 | End: 2023-01-01

## 2023-01-01 RX ORDER — BISACODYL 10 MG
10 SUPPOSITORY, RECTAL RECTAL
Status: DISCONTINUED | OUTPATIENT
Start: 2023-01-01 | End: 2023-01-01

## 2023-01-01 RX ORDER — LORAZEPAM 2 MG/ML
1-3 INJECTION INTRAMUSCULAR
Status: DISCONTINUED | OUTPATIENT
Start: 2023-01-01 | End: 2023-01-01 | Stop reason: HOSPADM

## 2023-01-01 RX ORDER — LINEZOLID 2 MG/ML
600 INJECTION, SOLUTION INTRAVENOUS EVERY 12 HOURS
Status: DISCONTINUED | OUTPATIENT
Start: 2023-01-01 | End: 2023-01-01

## 2023-01-01 RX ORDER — CHOLECALCIFEROL (VITAMIN D3) 125 MCG
5 CAPSULE ORAL NIGHTLY
Status: DISCONTINUED | OUTPATIENT
Start: 2023-01-01 | End: 2023-01-01

## 2023-01-01 RX ORDER — ONDANSETRON 2 MG/ML
8 INJECTION INTRAMUSCULAR; INTRAVENOUS EVERY 8 HOURS PRN
Status: DISCONTINUED | OUTPATIENT
Start: 2023-01-01 | End: 2023-01-01 | Stop reason: HOSPADM

## 2023-01-01 RX ORDER — FUROSEMIDE 10 MG/ML
40 INJECTION INTRAMUSCULAR; INTRAVENOUS
Status: DISCONTINUED | OUTPATIENT
Start: 2023-01-01 | End: 2023-01-01

## 2023-01-01 RX ORDER — FUROSEMIDE 20 MG/1
40 TABLET ORAL
Status: DISCONTINUED | OUTPATIENT
Start: 2023-01-01 | End: 2023-01-01

## 2023-01-01 RX ORDER — ROCURONIUM BROMIDE 10 MG/ML
100 INJECTION, SOLUTION INTRAVENOUS ONCE
Status: COMPLETED | OUTPATIENT
Start: 2023-01-01 | End: 2023-01-01

## 2023-01-01 RX ORDER — ETOMIDATE 2 MG/ML
30 INJECTION INTRAVENOUS ONCE
Status: COMPLETED | OUTPATIENT
Start: 2023-01-01 | End: 2023-01-01

## 2023-01-01 RX ORDER — SERTRALINE HYDROCHLORIDE 100 MG/1
100 TABLET, FILM COATED ORAL DAILY
COMMUNITY

## 2023-01-01 RX ORDER — IPRATROPIUM BROMIDE AND ALBUTEROL SULFATE 2.5; .5 MG/3ML; MG/3ML
3 SOLUTION RESPIRATORY (INHALATION) 4 TIMES DAILY
COMMUNITY

## 2023-01-01 RX ORDER — LACTULOSE 20 G/30ML
30 SOLUTION ORAL 3 TIMES DAILY
Status: DISCONTINUED | OUTPATIENT
Start: 2023-01-01 | End: 2023-01-01

## 2023-01-01 RX ORDER — MIDAZOLAM HYDROCHLORIDE 1 MG/ML
2 INJECTION INTRAMUSCULAR; INTRAVENOUS ONCE
Status: COMPLETED | OUTPATIENT
Start: 2023-01-01 | End: 2023-01-01

## 2023-01-01 RX ORDER — INSULIN LISPRO 100 [IU]/ML
3-14 INJECTION, SOLUTION INTRAVENOUS; SUBCUTANEOUS EVERY 6 HOURS
Status: DISCONTINUED | OUTPATIENT
Start: 2023-01-01 | End: 2023-01-01

## 2023-01-01 RX ORDER — ENEMA 19; 7 G/133ML; G/133ML
1 ENEMA RECTAL ONCE
Status: COMPLETED | OUTPATIENT
Start: 2023-01-01 | End: 2023-01-01

## 2023-01-01 RX ORDER — METOPROLOL SUCCINATE 25 MG/1
25 TABLET, EXTENDED RELEASE ORAL DAILY
COMMUNITY

## 2023-01-01 RX ORDER — FUROSEMIDE 20 MG/1
20 TABLET ORAL DAILY
COMMUNITY

## 2023-01-01 RX ORDER — FAMOTIDINE 20 MG/1
20 TABLET, FILM COATED ORAL DAILY
Status: DISCONTINUED | OUTPATIENT
Start: 2023-01-01 | End: 2023-01-01

## 2023-01-01 RX ORDER — ACETAMINOPHEN 650 MG/1
650 SUPPOSITORY RECTAL EVERY 6 HOURS PRN
Status: DISCONTINUED | OUTPATIENT
Start: 2023-01-01 | End: 2023-01-01

## 2023-01-01 RX ORDER — IBUPROFEN 400 MG/1
400 TABLET ORAL EVERY 6 HOURS PRN
Status: DISCONTINUED | OUTPATIENT
Start: 2023-01-01 | End: 2023-01-01

## 2023-01-01 RX ORDER — MIDAZOLAM HYDROCHLORIDE 1 MG/ML
1 INJECTION INTRAMUSCULAR; INTRAVENOUS ONCE
Status: DISCONTINUED | OUTPATIENT
Start: 2023-01-01 | End: 2023-01-01

## 2023-01-01 RX ORDER — ROSUVASTATIN CALCIUM 20 MG/1
20 TABLET, COATED ORAL EVERY EVENING
COMMUNITY

## 2023-01-01 RX ORDER — FLUTICASONE PROPIONATE 44 UG/1
2 AEROSOL, METERED RESPIRATORY (INHALATION)
Status: DISCONTINUED | OUTPATIENT
Start: 2023-01-01 | End: 2023-01-01

## 2023-01-01 RX ORDER — HEPARIN SODIUM 1000 [USP'U]/ML
2800 INJECTION, SOLUTION INTRAVENOUS; SUBCUTANEOUS PRN
Status: DISCONTINUED | OUTPATIENT
Start: 2023-01-01 | End: 2023-01-01

## 2023-01-01 RX ORDER — POTASSIUM CHLORIDE 7.45 MG/ML
10 INJECTION INTRAVENOUS ONCE
Status: COMPLETED | OUTPATIENT
Start: 2023-01-01 | End: 2023-01-01

## 2023-01-01 RX ORDER — POLYETHYLENE GLYCOL 3350 17 G/17G
1 POWDER, FOR SOLUTION ORAL
Status: DISCONTINUED | OUTPATIENT
Start: 2023-01-01 | End: 2023-01-01

## 2023-01-01 RX ORDER — PROPOFOL 10 MG/ML
20 INJECTION, EMULSION INTRAVENOUS ONCE
Status: COMPLETED | OUTPATIENT
Start: 2023-01-01 | End: 2023-01-01

## 2023-01-01 RX ORDER — PREDNISONE 20 MG/1
20 TABLET ORAL DAILY
COMMUNITY
Start: 2023-01-01

## 2023-01-01 RX ORDER — ROCURONIUM BROMIDE 10 MG/ML
0.6 INJECTION, SOLUTION INTRAVENOUS
Status: DISCONTINUED | OUTPATIENT
Start: 2023-01-01 | End: 2023-01-01

## 2023-01-01 RX ORDER — ACETAMINOPHEN 500 MG
1000 TABLET ORAL EVERY 6 HOURS PRN
Status: DISCONTINUED | OUTPATIENT
Start: 2023-01-01 | End: 2023-01-01

## 2023-01-01 RX ORDER — IPRATROPIUM BROMIDE AND ALBUTEROL SULFATE 2.5; .5 MG/3ML; MG/3ML
3 SOLUTION RESPIRATORY (INHALATION)
Status: DISCONTINUED | OUTPATIENT
Start: 2023-01-01 | End: 2023-01-01

## 2023-01-01 RX ORDER — NOREPINEPHRINE BITARTRATE 0.03 MG/ML
0-1 INJECTION, SOLUTION INTRAVENOUS CONTINUOUS
Status: DISCONTINUED | OUTPATIENT
Start: 2023-01-01 | End: 2023-01-01

## 2023-01-01 RX ORDER — CALCIUM ACETATE 667 MG/1
2001 TABLET ORAL EVERY 8 HOURS
Status: DISCONTINUED | OUTPATIENT
Start: 2023-01-01 | End: 2023-01-01

## 2023-01-01 RX ORDER — PROPOFOL 10 MG/ML
30 INJECTION, EMULSION INTRAVENOUS ONCE
Status: COMPLETED | OUTPATIENT
Start: 2023-01-01 | End: 2023-01-01

## 2023-01-01 RX ORDER — SODIUM CHLORIDE, SODIUM LACTATE, POTASSIUM CHLORIDE, AND CALCIUM CHLORIDE .6; .31; .03; .02 G/100ML; G/100ML; G/100ML; G/100ML
500 INJECTION, SOLUTION INTRAVENOUS ONCE
Status: COMPLETED | OUTPATIENT
Start: 2023-01-01 | End: 2023-01-01

## 2023-01-01 RX ORDER — ROCURONIUM BROMIDE 10 MG/ML
100 INJECTION, SOLUTION INTRAVENOUS ONCE
Status: DISCONTINUED | OUTPATIENT
Start: 2023-01-01 | End: 2023-01-01

## 2023-01-01 RX ORDER — METHYLPREDNISOLONE SODIUM SUCCINATE 125 MG/2ML
125 INJECTION, POWDER, LYOPHILIZED, FOR SOLUTION INTRAMUSCULAR; INTRAVENOUS EVERY 6 HOURS
Status: DISCONTINUED | OUTPATIENT
Start: 2023-01-01 | End: 2023-01-01

## 2023-01-01 RX ORDER — POTASSIUM CHLORIDE 7.45 MG/ML
10 INJECTION INTRAVENOUS
Status: DISCONTINUED | OUTPATIENT
Start: 2023-01-01 | End: 2023-01-01

## 2023-01-01 RX ORDER — ACETAMINOPHEN 325 MG/1
650 TABLET ORAL EVERY 4 HOURS PRN
Status: DISCONTINUED | OUTPATIENT
Start: 2023-01-01 | End: 2023-01-01 | Stop reason: HOSPADM

## 2023-01-01 RX ORDER — ETOMIDATE 2 MG/ML
30 INJECTION INTRAVENOUS ONCE
Status: DISCONTINUED | OUTPATIENT
Start: 2023-01-01 | End: 2023-01-01

## 2023-01-01 RX ORDER — IPRATROPIUM BROMIDE AND ALBUTEROL SULFATE 2.5; .5 MG/3ML; MG/3ML
3 SOLUTION RESPIRATORY (INHALATION)
COMMUNITY

## 2023-01-01 RX ORDER — FUROSEMIDE 20 MG/1
20 TABLET ORAL
Status: DISCONTINUED | OUTPATIENT
Start: 2023-01-01 | End: 2023-01-01

## 2023-01-01 RX ORDER — ROSUVASTATIN CALCIUM 10 MG/1
10 TABLET, COATED ORAL EVERY EVENING
Status: DISCONTINUED | OUTPATIENT
Start: 2023-01-01 | End: 2023-01-01

## 2023-01-01 RX ORDER — PHENYLEPHRINE HCL IN 0.9% NACL 0.5 MG/5ML
100-300 SYRINGE (ML) INTRAVENOUS
Status: ACTIVE | OUTPATIENT
Start: 2023-01-01 | End: 2023-01-01

## 2023-01-01 RX ORDER — ROSUVASTATIN CALCIUM 10 MG/1
20 TABLET, COATED ORAL EVERY EVENING
Status: DISCONTINUED | OUTPATIENT
Start: 2023-01-01 | End: 2023-01-01

## 2023-01-01 RX ORDER — ACETAMINOPHEN 325 MG/1
650 TABLET ORAL EVERY 6 HOURS PRN
Status: DISCONTINUED | OUTPATIENT
Start: 2023-01-01 | End: 2023-01-01

## 2023-01-01 RX ORDER — LINEZOLID 600 MG/1
600 TABLET, FILM COATED ORAL EVERY 12 HOURS
Status: DISCONTINUED | OUTPATIENT
Start: 2023-01-01 | End: 2023-01-01

## 2023-01-01 RX ORDER — AMLODIPINE BESYLATE 10 MG/1
10 TABLET ORAL DAILY
Status: DISCONTINUED | OUTPATIENT
Start: 2023-01-01 | End: 2023-01-01

## 2023-01-01 RX ORDER — MIDAZOLAM HYDROCHLORIDE 1 MG/ML
5 INJECTION INTRAMUSCULAR; INTRAVENOUS
Status: DISCONTINUED | OUTPATIENT
Start: 2023-01-01 | End: 2023-01-01

## 2023-01-01 RX ORDER — METHYLPREDNISOLONE SODIUM SUCCINATE 125 MG/2ML
80 INJECTION, POWDER, LYOPHILIZED, FOR SOLUTION INTRAMUSCULAR; INTRAVENOUS EVERY 8 HOURS
Status: DISCONTINUED | OUTPATIENT
Start: 2023-01-01 | End: 2023-01-01

## 2023-01-01 RX ORDER — ACETAMINOPHEN 650 MG/1
650 SUPPOSITORY RECTAL EVERY 4 HOURS PRN
Status: DISCONTINUED | OUTPATIENT
Start: 2023-01-01 | End: 2023-01-01 | Stop reason: HOSPADM

## 2023-01-01 RX ORDER — METOPROLOL SUCCINATE 25 MG/1
25 TABLET, EXTENDED RELEASE ORAL DAILY
Status: DISCONTINUED | OUTPATIENT
Start: 2023-01-01 | End: 2023-01-01

## 2023-01-01 RX ORDER — FAMOTIDINE 20 MG/1
20 TABLET, FILM COATED ORAL EVERY 12 HOURS
Status: DISCONTINUED | OUTPATIENT
Start: 2023-01-01 | End: 2023-01-01

## 2023-01-01 RX ORDER — NOREPINEPHRINE BITARTRATE 0.03 MG/ML
INJECTION, SOLUTION INTRAVENOUS
Status: COMPLETED
Start: 2023-01-01 | End: 2023-01-01

## 2023-01-01 RX ORDER — AMOXICILLIN 250 MG
2 CAPSULE ORAL ONCE
Status: COMPLETED | OUTPATIENT
Start: 2023-01-01 | End: 2023-01-01

## 2023-01-01 RX ORDER — AMOXICILLIN AND CLAVULANATE POTASSIUM 875; 125 MG/1; MG/1
1 TABLET, FILM COATED ORAL EVERY 12 HOURS
Status: DISCONTINUED | OUTPATIENT
Start: 2023-01-01 | End: 2023-01-01

## 2023-01-01 RX ORDER — FLUTICASONE FUROATE, UMECLIDINIUM BROMIDE AND VILANTEROL TRIFENATATE 100; 62.5; 25 UG/1; UG/1; UG/1
1 POWDER RESPIRATORY (INHALATION) DAILY
COMMUNITY

## 2023-01-01 RX ORDER — METHYLPREDNISOLONE SODIUM SUCCINATE 40 MG/ML
80 INJECTION, POWDER, LYOPHILIZED, FOR SOLUTION INTRAMUSCULAR; INTRAVENOUS EVERY 8 HOURS
Status: DISCONTINUED | OUTPATIENT
Start: 2023-01-01 | End: 2023-01-01

## 2023-01-01 RX ORDER — ROCURONIUM BROMIDE 10 MG/ML
0.6 INJECTION, SOLUTION INTRAVENOUS ONCE
Status: COMPLETED | OUTPATIENT
Start: 2023-01-01 | End: 2023-01-01

## 2023-01-01 RX ORDER — ATROPINE SULFATE 10 MG/ML
2 SOLUTION/ DROPS OPHTHALMIC EVERY 4 HOURS PRN
Status: DISCONTINUED | OUTPATIENT
Start: 2023-01-01 | End: 2023-01-01 | Stop reason: HOSPADM

## 2023-01-01 RX ORDER — ROSUVASTATIN CALCIUM 10 MG/1
10 TABLET, COATED ORAL ONCE
Status: COMPLETED | OUTPATIENT
Start: 2023-01-01 | End: 2023-01-01

## 2023-01-01 RX ORDER — POTASSIUM CHLORIDE 14.9 MG/ML
20 INJECTION INTRAVENOUS ONCE
Status: COMPLETED | OUTPATIENT
Start: 2023-01-01 | End: 2023-01-01

## 2023-01-01 RX ORDER — METHYLPREDNISOLONE SODIUM SUCCINATE 40 MG/ML
40 INJECTION, POWDER, LYOPHILIZED, FOR SOLUTION INTRAMUSCULAR; INTRAVENOUS EVERY 12 HOURS
Status: DISCONTINUED | OUTPATIENT
Start: 2023-01-01 | End: 2023-01-01

## 2023-01-01 RX ORDER — METHYLPREDNISOLONE SODIUM SUCCINATE 40 MG/ML
40 INJECTION, POWDER, LYOPHILIZED, FOR SOLUTION INTRAMUSCULAR; INTRAVENOUS EVERY 6 HOURS
Status: DISCONTINUED | OUTPATIENT
Start: 2023-01-01 | End: 2023-01-01

## 2023-01-01 RX ORDER — ENOXAPARIN SODIUM 100 MG/ML
40 INJECTION SUBCUTANEOUS EVERY 12 HOURS
Status: DISCONTINUED | OUTPATIENT
Start: 2023-01-01 | End: 2023-01-01

## 2023-01-01 RX ORDER — ROCURONIUM BROMIDE 10 MG/ML
50 INJECTION, SOLUTION INTRAVENOUS ONCE
Status: COMPLETED | OUTPATIENT
Start: 2023-01-01 | End: 2023-01-01

## 2023-01-01 RX ADMIN — FLUTICASONE PROPIONATE 88 MCG: 44 AEROSOL, METERED RESPIRATORY (INHALATION) at 07:31

## 2023-01-01 RX ADMIN — PROPOFOL 80 MCG/KG/MIN: 10 INJECTION, EMULSION INTRAVENOUS at 14:07

## 2023-01-01 RX ADMIN — CEFEPIME 2 G: 2 INJECTION, POWDER, FOR SOLUTION INTRAVENOUS at 05:09

## 2023-01-01 RX ADMIN — INSULIN HUMAN 3 UNITS: 100 INJECTION, SOLUTION PARENTERAL at 13:27

## 2023-01-01 RX ADMIN — ACETYLCYSTEINE 3 ML: 200 SOLUTION ORAL; RESPIRATORY (INHALATION) at 10:02

## 2023-01-01 RX ADMIN — ROCURONIUM BROMIDE 10 MCG/KG/MIN: 10 INJECTION, SOLUTION INTRAVENOUS at 00:14

## 2023-01-01 RX ADMIN — IPRATROPIUM BROMIDE AND ALBUTEROL SULFATE 3 ML: .5; 2.5 SOLUTION RESPIRATORY (INHALATION) at 02:51

## 2023-01-01 RX ADMIN — FUROSEMIDE 80 MG: 10 INJECTION, SOLUTION INTRAMUSCULAR; INTRAVENOUS at 14:16

## 2023-01-01 RX ADMIN — MORPHINE SULFATE 10 MG: 10 INJECTION INTRAVENOUS at 12:43

## 2023-01-01 RX ADMIN — SERTRALINE 100 MG: 50 TABLET, FILM COATED ORAL at 06:31

## 2023-01-01 RX ADMIN — SODIUM CHLORIDE 250 MG: 9 INJECTION, SOLUTION INTRAVENOUS at 12:00

## 2023-01-01 RX ADMIN — METHYLPREDNISOLONE SODIUM SUCCINATE 40 MG: 40 INJECTION, POWDER, FOR SOLUTION INTRAMUSCULAR; INTRAVENOUS at 17:13

## 2023-01-01 RX ADMIN — MINERAL OIL, PETROLATUM 1 APPLICATION: 425; 573 OINTMENT OPHTHALMIC at 06:29

## 2023-01-01 RX ADMIN — IPRATROPIUM BROMIDE AND ALBUTEROL SULFATE 3 ML: .5; 2.5 SOLUTION RESPIRATORY (INHALATION) at 06:34

## 2023-01-01 RX ADMIN — AMLODIPINE BESYLATE 10 MG: 10 TABLET ORAL at 06:32

## 2023-01-01 RX ADMIN — PROPOFOL 80 MCG/KG/MIN: 10 INJECTION, EMULSION INTRAVENOUS at 06:52

## 2023-01-01 RX ADMIN — LORAZEPAM 2 MG: 2 INJECTION INTRAMUSCULAR; INTRAVENOUS at 12:43

## 2023-01-01 RX ADMIN — NOREPINEPHRINE BITARTRATE 0.3 MCG/KG/MIN: 1 INJECTION, SOLUTION, CONCENTRATE INTRAVENOUS at 04:13

## 2023-01-01 RX ADMIN — DOXYCYCLINE 100 MG: 100 INJECTION, POWDER, LYOPHILIZED, FOR SOLUTION INTRAVENOUS at 05:10

## 2023-01-01 RX ADMIN — HEPARIN SODIUM 5000 UNITS: 5000 INJECTION, SOLUTION INTRAVENOUS; SUBCUTANEOUS at 14:57

## 2023-01-01 RX ADMIN — PROPOFOL 80 MCG/KG/MIN: 10 INJECTION, EMULSION INTRAVENOUS at 18:35

## 2023-01-01 RX ADMIN — DOXYCYCLINE 100 MG: 100 INJECTION, POWDER, LYOPHILIZED, FOR SOLUTION INTRAVENOUS at 06:05

## 2023-01-01 RX ADMIN — DOCUSATE SODIUM 50 MG AND SENNOSIDES 8.6 MG 2 TABLET: 8.6; 5 TABLET, FILM COATED ORAL at 05:14

## 2023-01-01 RX ADMIN — ROCURONIUM BROMIDE 12 MCG/KG/MIN: 10 INJECTION, SOLUTION INTRAVENOUS at 11:03

## 2023-01-01 RX ADMIN — DOCUSATE SODIUM 50 MG AND SENNOSIDES 8.6 MG 2 TABLET: 8.6; 5 TABLET, FILM COATED ORAL at 05:00

## 2023-01-01 RX ADMIN — PROPOFOL 80 MCG/KG/MIN: 10 INJECTION, EMULSION INTRAVENOUS at 13:05

## 2023-01-01 RX ADMIN — AMOXICILLIN AND CLAVULANATE POTASSIUM 1 TABLET: 875; 125 TABLET, FILM COATED ORAL at 17:13

## 2023-01-01 RX ADMIN — ENOXAPARIN SODIUM 40 MG: 40 INJECTION SUBCUTANEOUS at 05:07

## 2023-01-01 RX ADMIN — FENTANYL CITRATE 100 MCG: 50 INJECTION, SOLUTION INTRAMUSCULAR; INTRAVENOUS at 14:34

## 2023-01-01 RX ADMIN — SODIUM CHLORIDE 250 MG: 9 INJECTION, SOLUTION INTRAVENOUS at 12:21

## 2023-01-01 RX ADMIN — CEFEPIME 2 G: 2 INJECTION, POWDER, FOR SOLUTION INTRAVENOUS at 14:44

## 2023-01-01 RX ADMIN — PROPOFOL 80 MCG/KG/MIN: 10 INJECTION, EMULSION INTRAVENOUS at 05:29

## 2023-01-01 RX ADMIN — DOCUSATE SODIUM 50 MG AND SENNOSIDES 8.6 MG 2 TABLET: 8.6; 5 TABLET, FILM COATED ORAL at 05:23

## 2023-01-01 RX ADMIN — AMPICILLIN AND SULBACTAM 3 G: 1; 2 INJECTION, POWDER, FOR SOLUTION INTRAMUSCULAR; INTRAVENOUS at 17:36

## 2023-01-01 RX ADMIN — NOREPINEPHRINE BITARTRATE 0.25 MCG/KG/MIN: 1 INJECTION, SOLUTION, CONCENTRATE INTRAVENOUS at 07:58

## 2023-01-01 RX ADMIN — PROPOFOL 80 MCG/KG/MIN: 10 INJECTION, EMULSION INTRAVENOUS at 09:50

## 2023-01-01 RX ADMIN — ROCURONIUM BROMIDE 10 MCG/KG/MIN: 10 INJECTION, SOLUTION INTRAVENOUS at 06:54

## 2023-01-01 RX ADMIN — PROPOFOL 80 MCG/KG/MIN: 10 INJECTION, EMULSION INTRAVENOUS at 00:11

## 2023-01-01 RX ADMIN — IPRATROPIUM BROMIDE AND ALBUTEROL SULFATE 3 ML: .5; 2.5 SOLUTION RESPIRATORY (INHALATION) at 14:05

## 2023-01-01 RX ADMIN — FUROSEMIDE 40 MG: 10 INJECTION, SOLUTION INTRAMUSCULAR; INTRAVENOUS at 16:49

## 2023-01-01 RX ADMIN — Medication 500 MCG/HR: at 05:59

## 2023-01-01 RX ADMIN — IPRATROPIUM BROMIDE AND ALBUTEROL SULFATE 3 ML: .5; 2.5 SOLUTION RESPIRATORY (INHALATION) at 18:42

## 2023-01-01 RX ADMIN — PROPOFOL 80 MCG/KG/MIN: 10 INJECTION, EMULSION INTRAVENOUS at 18:04

## 2023-01-01 RX ADMIN — PROPOFOL 80 MCG/KG/MIN: 10 INJECTION, EMULSION INTRAVENOUS at 22:20

## 2023-01-01 RX ADMIN — IPRATROPIUM BROMIDE AND ALBUTEROL SULFATE 3 ML: .5; 2.5 SOLUTION RESPIRATORY (INHALATION) at 23:00

## 2023-01-01 RX ADMIN — SERTRALINE 100 MG: 50 TABLET, FILM COATED ORAL at 05:24

## 2023-01-01 RX ADMIN — ROSUVASTATIN 20 MG: 10 TABLET, FILM COATED ORAL at 22:04

## 2023-01-01 RX ADMIN — HEPARIN SODIUM 5000 UNITS: 5000 INJECTION, SOLUTION INTRAVENOUS; SUBCUTANEOUS at 05:06

## 2023-01-01 RX ADMIN — PROPOFOL 80 MCG/KG/MIN: 10 INJECTION, EMULSION INTRAVENOUS at 05:11

## 2023-01-01 RX ADMIN — IPRATROPIUM BROMIDE AND ALBUTEROL SULFATE 3 ML: .5; 2.5 SOLUTION RESPIRATORY (INHALATION) at 03:06

## 2023-01-01 RX ADMIN — HEPARIN SODIUM 2800 UNITS: 1000 INJECTION, SOLUTION INTRAVENOUS; SUBCUTANEOUS at 09:00

## 2023-01-01 RX ADMIN — ACETYLCYSTEINE 3 ML: 200 SOLUTION ORAL; RESPIRATORY (INHALATION) at 10:51

## 2023-01-01 RX ADMIN — LINEZOLID 600 MG: 600 INJECTION, SOLUTION INTRAVENOUS at 04:43

## 2023-01-01 RX ADMIN — IPRATROPIUM BROMIDE AND ALBUTEROL SULFATE 3 ML: .5; 2.5 SOLUTION RESPIRATORY (INHALATION) at 22:44

## 2023-01-01 RX ADMIN — PROPOFOL 80 MCG/KG/MIN: 10 INJECTION, EMULSION INTRAVENOUS at 05:58

## 2023-01-01 RX ADMIN — METOPROLOL SUCCINATE 25 MG: 25 TABLET, EXTENDED RELEASE ORAL at 06:31

## 2023-01-01 RX ADMIN — ROSUVASTATIN CALCIUM 20 MG: 10 TABLET, FILM COATED ORAL at 17:15

## 2023-01-01 RX ADMIN — PROPOFOL 80 MCG/KG/MIN: 10 INJECTION, EMULSION INTRAVENOUS at 00:01

## 2023-01-01 RX ADMIN — IPRATROPIUM BROMIDE AND ALBUTEROL SULFATE 3 ML: .5; 2.5 SOLUTION RESPIRATORY (INHALATION) at 07:30

## 2023-01-01 RX ADMIN — FUROSEMIDE 40 MG: 10 INJECTION, SOLUTION INTRAMUSCULAR; INTRAVENOUS at 15:04

## 2023-01-01 RX ADMIN — Medication 600 MCG/HR: at 18:49

## 2023-01-01 RX ADMIN — PROPOFOL 80 MCG/KG/MIN: 10 INJECTION, EMULSION INTRAVENOUS at 03:27

## 2023-01-01 RX ADMIN — Medication 600 MCG/HR: at 00:27

## 2023-01-01 RX ADMIN — HEPARIN SODIUM 5000 UNITS: 5000 INJECTION, SOLUTION INTRAVENOUS; SUBCUTANEOUS at 13:22

## 2023-01-01 RX ADMIN — IPRATROPIUM BROMIDE AND ALBUTEROL SULFATE 3 ML: .5; 2.5 SOLUTION RESPIRATORY (INHALATION) at 19:27

## 2023-01-01 RX ADMIN — PROPOFOL 80 MCG/KG/MIN: 10 INJECTION, EMULSION INTRAVENOUS at 08:02

## 2023-01-01 RX ADMIN — PROPOFOL 80 MCG/KG/MIN: 10 INJECTION, EMULSION INTRAVENOUS at 05:57

## 2023-01-01 RX ADMIN — IPRATROPIUM BROMIDE AND ALBUTEROL SULFATE 3 ML: .5; 2.5 SOLUTION RESPIRATORY (INHALATION) at 15:07

## 2023-01-01 RX ADMIN — ACETYLCYSTEINE 3 ML: 200 SOLUTION ORAL; RESPIRATORY (INHALATION) at 03:24

## 2023-01-01 RX ADMIN — AMLODIPINE BESYLATE 10 MG: 10 TABLET ORAL at 06:30

## 2023-01-01 RX ADMIN — FLUTICASONE PROPIONATE 88 MCG: 44 AEROSOL, METERED RESPIRATORY (INHALATION) at 07:07

## 2023-01-01 RX ADMIN — POTASSIUM CHLORIDE 40 MEQ: 1500 TABLET, EXTENDED RELEASE ORAL at 10:12

## 2023-01-01 RX ADMIN — ENOXAPARIN SODIUM 40 MG: 40 INJECTION SUBCUTANEOUS at 17:15

## 2023-01-01 RX ADMIN — IPRATROPIUM BROMIDE AND ALBUTEROL SULFATE 3 ML: .5; 2.5 SOLUTION RESPIRATORY (INHALATION) at 02:31

## 2023-01-01 RX ADMIN — MINERAL OIL, PETROLATUM 1 APPLICATION: 425; 573 OINTMENT OPHTHALMIC at 22:24

## 2023-01-01 RX ADMIN — FUROSEMIDE 20 MG: 20 TABLET ORAL at 05:06

## 2023-01-01 RX ADMIN — LINEZOLID 600 MG: 600 TABLET, FILM COATED ORAL at 23:57

## 2023-01-01 RX ADMIN — PROPOFOL 80 MCG/KG/MIN: 10 INJECTION, EMULSION INTRAVENOUS at 17:31

## 2023-01-01 RX ADMIN — ROCURONIUM BROMIDE 46.6 MG: 50 INJECTION INTRAVENOUS at 08:59

## 2023-01-01 RX ADMIN — PROPOFOL 80 MCG/KG/MIN: 10 INJECTION, EMULSION INTRAVENOUS at 10:41

## 2023-01-01 RX ADMIN — ACETAMINOPHEN 650 MG: 325 TABLET, FILM COATED ORAL at 21:35

## 2023-01-01 RX ADMIN — PROPOFOL 80 MCG/KG/MIN: 10 INJECTION, EMULSION INTRAVENOUS at 00:49

## 2023-01-01 RX ADMIN — BENAZEPRIL HYDROCHLORIDE 40 MG: 20 TABLET ORAL at 06:45

## 2023-01-01 RX ADMIN — ROCURONIUM BROMIDE 10 MCG/KG/MIN: 10 INJECTION, SOLUTION INTRAVENOUS at 00:57

## 2023-01-01 RX ADMIN — Medication 600 MCG/HR: at 12:51

## 2023-01-01 RX ADMIN — Medication 200 MCG/HR: at 10:01

## 2023-01-01 RX ADMIN — ACETAMINOPHEN 1000 MG: 500 TABLET, FILM COATED ORAL at 22:25

## 2023-01-01 RX ADMIN — UMECLIDINIUM BROMIDE AND VILANTEROL TRIFENATATE 1 PUFF: 62.5; 25 POWDER RESPIRATORY (INHALATION) at 06:40

## 2023-01-01 RX ADMIN — CEFEPIME 2 G: 2 INJECTION, POWDER, FOR SOLUTION INTRAVENOUS at 23:58

## 2023-01-01 RX ADMIN — FUROSEMIDE 40 MG: 10 INJECTION, SOLUTION INTRAMUSCULAR; INTRAVENOUS at 16:56

## 2023-01-01 RX ADMIN — PROPOFOL 80 MCG/KG/MIN: 10 INJECTION, EMULSION INTRAVENOUS at 22:41

## 2023-01-01 RX ADMIN — Medication 600 MCG/HR: at 06:41

## 2023-01-01 RX ADMIN — METOPROLOL SUCCINATE 25 MG: 25 TABLET, EXTENDED RELEASE ORAL at 06:29

## 2023-01-01 RX ADMIN — Medication 600 MCG/HR: at 21:28

## 2023-01-01 RX ADMIN — DOXYCYCLINE 100 MG: 100 INJECTION, POWDER, LYOPHILIZED, FOR SOLUTION INTRAVENOUS at 17:20

## 2023-01-01 RX ADMIN — SODIUM CHLORIDE 4.5 UNITS/HR: 9 INJECTION, SOLUTION INTRAVENOUS at 14:10

## 2023-01-01 RX ADMIN — DOXYCYCLINE 100 MG: 100 INJECTION, POWDER, LYOPHILIZED, FOR SOLUTION INTRAVENOUS at 18:38

## 2023-01-01 RX ADMIN — IPRATROPIUM BROMIDE AND ALBUTEROL SULFATE 3 ML: .5; 2.5 SOLUTION RESPIRATORY (INHALATION) at 12:14

## 2023-01-01 RX ADMIN — ACETYLCYSTEINE 3 ML: 200 SOLUTION ORAL; RESPIRATORY (INHALATION) at 12:19

## 2023-01-01 RX ADMIN — LACTULOSE 30 ML: 10 SOLUTION ORAL at 12:47

## 2023-01-01 RX ADMIN — ENOXAPARIN SODIUM 40 MG: 40 INJECTION SUBCUTANEOUS at 05:31

## 2023-01-01 RX ADMIN — FLUTICASONE PROPIONATE 88 MCG: 44 AEROSOL, METERED RESPIRATORY (INHALATION) at 07:18

## 2023-01-01 RX ADMIN — CEFEPIME 1 G: 1 INJECTION, POWDER, FOR SOLUTION INTRAMUSCULAR; INTRAVENOUS at 21:39

## 2023-01-01 RX ADMIN — Medication 100 MCG/HR: at 08:57

## 2023-01-01 RX ADMIN — CEFEPIME 2 G: 2 INJECTION, POWDER, FOR SOLUTION INTRAVENOUS at 22:30

## 2023-01-01 RX ADMIN — ROSUVASTATIN CALCIUM 20 MG: 10 TABLET, FILM COATED ORAL at 17:32

## 2023-01-01 RX ADMIN — Medication 300 MCG/HR: at 10:49

## 2023-01-01 RX ADMIN — IPRATROPIUM BROMIDE AND ALBUTEROL SULFATE 3 ML: .5; 2.5 SOLUTION RESPIRATORY (INHALATION) at 14:10

## 2023-01-01 RX ADMIN — MINERAL OIL, PETROLATUM 1 APPLICATION: 425; 573 OINTMENT OPHTHALMIC at 22:23

## 2023-01-01 RX ADMIN — SODIUM CHLORIDE 2 UNITS/HR: 9 INJECTION, SOLUTION INTRAVENOUS at 20:24

## 2023-01-01 RX ADMIN — Medication 2001 MG: at 10:32

## 2023-01-01 RX ADMIN — Medication 500 MCG/HR: at 01:12

## 2023-01-01 RX ADMIN — POLYETHYLENE GLYCOL 3350, SODIUM SULFATE ANHYDROUS, SODIUM BICARBONATE, SODIUM CHLORIDE, POTASSIUM CHLORIDE 2 L: 236; 22.74; 6.74; 5.86; 2.97 POWDER, FOR SOLUTION ORAL at 21:55

## 2023-01-01 RX ADMIN — Medication 600 MCG/HR: at 04:25

## 2023-01-01 RX ADMIN — FENTANYL CITRATE 100 MCG: 50 INJECTION, SOLUTION INTRAMUSCULAR; INTRAVENOUS at 08:30

## 2023-01-01 RX ADMIN — ACETYLCYSTEINE 3 ML: 200 SOLUTION ORAL; RESPIRATORY (INHALATION) at 18:45

## 2023-01-01 RX ADMIN — METHYLNALTREXONE BROMIDE 10.2 MG: 12 INJECTION, SOLUTION SUBCUTANEOUS at 12:23

## 2023-01-01 RX ADMIN — ACETYLCYSTEINE 3 ML: 200 SOLUTION ORAL; RESPIRATORY (INHALATION) at 14:41

## 2023-01-01 RX ADMIN — IPRATROPIUM BROMIDE AND ALBUTEROL SULFATE 3 ML: .5; 2.5 SOLUTION RESPIRATORY (INHALATION) at 22:46

## 2023-01-01 RX ADMIN — ACETYLCYSTEINE 3 ML: 200 SOLUTION ORAL; RESPIRATORY (INHALATION) at 11:34

## 2023-01-01 RX ADMIN — FENTANYL CITRATE 50 MCG: 50 INJECTION, SOLUTION INTRAMUSCULAR; INTRAVENOUS at 01:15

## 2023-01-01 RX ADMIN — SERTRALINE 100 MG: 50 TABLET, FILM COATED ORAL at 04:36

## 2023-01-01 RX ADMIN — INSULIN HUMAN 5 UNITS: 100 INJECTION, SOLUTION PARENTERAL at 18:10

## 2023-01-01 RX ADMIN — DOXYCYCLINE 100 MG: 100 INJECTION, POWDER, LYOPHILIZED, FOR SOLUTION INTRAVENOUS at 06:34

## 2023-01-01 RX ADMIN — INSULIN HUMAN 6 UNITS: 100 INJECTION, SOLUTION PARENTERAL at 11:59

## 2023-01-01 RX ADMIN — ROCURONIUM BROMIDE 4 MCG/KG/MIN: 10 INJECTION, SOLUTION INTRAVENOUS at 20:31

## 2023-01-01 RX ADMIN — ACETAMINOPHEN 650 MG: 325 TABLET, FILM COATED ORAL at 21:16

## 2023-01-01 RX ADMIN — ACETYLCYSTEINE 3 ML: 200 SOLUTION ORAL; RESPIRATORY (INHALATION) at 03:07

## 2023-01-01 RX ADMIN — ROSUVASTATIN CALCIUM 10 MG: 10 TABLET, FILM COATED ORAL at 17:13

## 2023-01-01 RX ADMIN — VASOPRESSIN 0.03 UNITS/MIN: 20 INJECTION INTRAVENOUS at 07:40

## 2023-01-01 RX ADMIN — Medication 450 MCG/HR: at 20:39

## 2023-01-01 RX ADMIN — Medication 600 MCG/HR: at 23:25

## 2023-01-01 RX ADMIN — ENOXAPARIN SODIUM 40 MG: 40 INJECTION SUBCUTANEOUS at 05:08

## 2023-01-01 RX ADMIN — ACETAMINOPHEN 650 MG: 325 TABLET, FILM COATED ORAL at 11:12

## 2023-01-01 RX ADMIN — ACETAMINOPHEN 650 MG: 325 TABLET, FILM COATED ORAL at 18:17

## 2023-01-01 RX ADMIN — FLUTICASONE PROPIONATE 88 MCG: 44 AEROSOL, METERED RESPIRATORY (INHALATION) at 06:40

## 2023-01-01 RX ADMIN — IPRATROPIUM BROMIDE AND ALBUTEROL SULFATE 3 ML: .5; 2.5 SOLUTION RESPIRATORY (INHALATION) at 22:27

## 2023-01-01 RX ADMIN — FUROSEMIDE 80 MG: 10 INJECTION, SOLUTION INTRAMUSCULAR; INTRAVENOUS at 16:45

## 2023-01-01 RX ADMIN — DOCUSATE SODIUM 50 MG AND SENNOSIDES 8.6 MG 2 TABLET: 8.6; 5 TABLET, FILM COATED ORAL at 17:29

## 2023-01-01 RX ADMIN — ENOXAPARIN SODIUM 40 MG: 40 INJECTION SUBCUTANEOUS at 18:26

## 2023-01-01 RX ADMIN — CEFEPIME 2 G: 2 INJECTION, POWDER, FOR SOLUTION INTRAVENOUS at 14:09

## 2023-01-01 RX ADMIN — AMPICILLIN AND SULBACTAM 3 G: 1; 2 INJECTION, POWDER, FOR SOLUTION INTRAMUSCULAR; INTRAVENOUS at 06:27

## 2023-01-01 RX ADMIN — IPRATROPIUM BROMIDE AND ALBUTEROL SULFATE 3 ML: .5; 2.5 SOLUTION RESPIRATORY (INHALATION) at 04:03

## 2023-01-01 RX ADMIN — ENOXAPARIN SODIUM 40 MG: 40 INJECTION SUBCUTANEOUS at 17:29

## 2023-01-01 RX ADMIN — ACETYLCYSTEINE 3 ML: 200 SOLUTION ORAL; RESPIRATORY (INHALATION) at 22:00

## 2023-01-01 RX ADMIN — IPRATROPIUM BROMIDE AND ALBUTEROL SULFATE 3 ML: .5; 2.5 SOLUTION RESPIRATORY (INHALATION) at 06:36

## 2023-01-01 RX ADMIN — Medication 600 MCG/HR: at 10:50

## 2023-01-01 RX ADMIN — CEFEPIME 2 G: 2 INJECTION, POWDER, FOR SOLUTION INTRAVENOUS at 21:53

## 2023-01-01 RX ADMIN — DOCUSATE SODIUM 50 MG AND SENNOSIDES 8.6 MG 2 TABLET: 8.6; 5 TABLET, FILM COATED ORAL at 17:19

## 2023-01-01 RX ADMIN — INSULIN HUMAN 2 UNITS: 100 INJECTION, SOLUTION PARENTERAL at 21:45

## 2023-01-01 RX ADMIN — ROCURONIUM BROMIDE 50 MG: 10 INJECTION, SOLUTION INTRAVENOUS at 02:07

## 2023-01-01 RX ADMIN — Medication 300 MCG/HR: at 15:43

## 2023-01-01 RX ADMIN — HEPARIN SODIUM 5000 UNITS: 5000 INJECTION, SOLUTION INTRAVENOUS; SUBCUTANEOUS at 22:04

## 2023-01-01 RX ADMIN — AMOXICILLIN AND CLAVULANATE POTASSIUM 1 TABLET: 875; 125 TABLET, FILM COATED ORAL at 15:04

## 2023-01-01 RX ADMIN — METHYLPREDNISOLONE SODIUM SUCCINATE 40 MG: 40 INJECTION, POWDER, FOR SOLUTION INTRAMUSCULAR; INTRAVENOUS at 17:34

## 2023-01-01 RX ADMIN — METHYLPREDNISOLONE SODIUM SUCCINATE 80 MG: 40 INJECTION, POWDER, FOR SOLUTION INTRAMUSCULAR; INTRAVENOUS at 21:15

## 2023-01-01 RX ADMIN — INSULIN GLARGINE-YFGN 20 UNITS: 100 INJECTION, SOLUTION SUBCUTANEOUS at 17:20

## 2023-01-01 RX ADMIN — IPRATROPIUM BROMIDE AND ALBUTEROL SULFATE 3 ML: .5; 2.5 SOLUTION RESPIRATORY (INHALATION) at 15:17

## 2023-01-01 RX ADMIN — ACETYLCYSTEINE 3 ML: 200 SOLUTION ORAL; RESPIRATORY (INHALATION) at 21:29

## 2023-01-01 RX ADMIN — Medication 600 MCG/HR: at 16:21

## 2023-01-01 RX ADMIN — ENOXAPARIN SODIUM 40 MG: 40 INJECTION SUBCUTANEOUS at 06:32

## 2023-01-01 RX ADMIN — POLYETHYLENE GLYCOL 3350 1 PACKET: 17 POWDER, FOR SOLUTION ORAL at 08:22

## 2023-01-01 RX ADMIN — IPRATROPIUM BROMIDE AND ALBUTEROL SULFATE 3 ML: .5; 2.5 SOLUTION RESPIRATORY (INHALATION) at 10:33

## 2023-01-01 RX ADMIN — VASOPRESSIN 0.03 UNITS/MIN: 20 INJECTION INTRAVENOUS at 18:16

## 2023-01-01 RX ADMIN — METHYLPREDNISOLONE SODIUM SUCCINATE 40 MG: 40 INJECTION, POWDER, FOR SOLUTION INTRAMUSCULAR; INTRAVENOUS at 23:55

## 2023-01-01 RX ADMIN — SODIUM CHLORIDE 250 MG: 9 INJECTION, SOLUTION INTRAVENOUS at 01:01

## 2023-01-01 RX ADMIN — FUROSEMIDE 40 MG: 10 INJECTION, SOLUTION INTRAMUSCULAR; INTRAVENOUS at 06:34

## 2023-01-01 RX ADMIN — FUROSEMIDE 40 MG: 10 INJECTION, SOLUTION INTRAMUSCULAR; INTRAVENOUS at 06:32

## 2023-01-01 RX ADMIN — ACETYLCYSTEINE 3 ML: 200 SOLUTION ORAL; RESPIRATORY (INHALATION) at 22:47

## 2023-01-01 RX ADMIN — Medication 400 MCG/HR: at 12:02

## 2023-01-01 RX ADMIN — SERTRALINE 100 MG: 50 TABLET, FILM COATED ORAL at 05:07

## 2023-01-01 RX ADMIN — NOREPINEPHRINE BITARTRATE 0.15 MCG/KG/MIN: 1 INJECTION, SOLUTION, CONCENTRATE INTRAVENOUS at 11:08

## 2023-01-01 RX ADMIN — MIDAZOLAM HYDROCHLORIDE 5 MG: 1 INJECTION, SOLUTION INTRAMUSCULAR; INTRAVENOUS at 12:25

## 2023-01-01 RX ADMIN — PROPOFOL 80 MCG/KG/MIN: 10 INJECTION, EMULSION INTRAVENOUS at 20:48

## 2023-01-01 RX ADMIN — PIPERACILLIN AND TAZOBACTAM 3.38 G: 3; .375 INJECTION, POWDER, LYOPHILIZED, FOR SOLUTION INTRAVENOUS; PARENTERAL at 17:05

## 2023-01-01 RX ADMIN — INSULIN HUMAN 2 UNITS: 100 INJECTION, SOLUTION PARENTERAL at 07:08

## 2023-01-01 RX ADMIN — HEPARIN SODIUM 2800 UNITS: 1000 INJECTION, SOLUTION INTRAVENOUS; SUBCUTANEOUS at 10:50

## 2023-01-01 RX ADMIN — SODIUM CHLORIDE 2 UNITS/HR: 9 INJECTION, SOLUTION INTRAVENOUS at 13:19

## 2023-01-01 RX ADMIN — Medication 300 MCG/HR: at 19:06

## 2023-01-01 RX ADMIN — INSULIN HUMAN 5 UNITS: 100 INJECTION, SOLUTION PARENTERAL at 23:21

## 2023-01-01 RX ADMIN — FENTANYL CITRATE 100 MCG: 50 INJECTION, SOLUTION INTRAMUSCULAR; INTRAVENOUS at 08:21

## 2023-01-01 RX ADMIN — METHYLPREDNISOLONE SODIUM SUCCINATE 80 MG: 40 INJECTION, POWDER, FOR SOLUTION INTRAMUSCULAR; INTRAVENOUS at 22:32

## 2023-01-01 RX ADMIN — INSULIN GLARGINE-YFGN 20 UNITS: 100 INJECTION, SOLUTION SUBCUTANEOUS at 18:34

## 2023-01-01 RX ADMIN — IPRATROPIUM BROMIDE AND ALBUTEROL SULFATE 3 ML: .5; 2.5 SOLUTION RESPIRATORY (INHALATION) at 01:42

## 2023-01-01 RX ADMIN — DOCUSATE SODIUM 50 MG AND SENNOSIDES 8.6 MG 2 TABLET: 8.6; 5 TABLET, FILM COATED ORAL at 05:05

## 2023-01-01 RX ADMIN — DOCUSATE SODIUM 50 MG AND SENNOSIDES 8.6 MG 2 TABLET: 8.6; 5 TABLET, FILM COATED ORAL at 06:44

## 2023-01-01 RX ADMIN — ROSUVASTATIN CALCIUM 10 MG: 10 TABLET, FILM COATED ORAL at 18:02

## 2023-01-01 RX ADMIN — PROPOFOL 80 MCG/KG/MIN: 10 INJECTION, EMULSION INTRAVENOUS at 18:43

## 2023-01-01 RX ADMIN — IPRATROPIUM BROMIDE AND ALBUTEROL SULFATE 3 ML: .5; 2.5 SOLUTION RESPIRATORY (INHALATION) at 11:34

## 2023-01-01 RX ADMIN — METHYLPREDNISOLONE SODIUM SUCCINATE 80 MG: 40 INJECTION, POWDER, FOR SOLUTION INTRAMUSCULAR; INTRAVENOUS at 13:09

## 2023-01-01 RX ADMIN — Medication 300 MCG/HR: at 21:08

## 2023-01-01 RX ADMIN — INSULIN HUMAN 3 UNITS: 100 INJECTION, SOLUTION PARENTERAL at 12:25

## 2023-01-01 RX ADMIN — IPRATROPIUM BROMIDE AND ALBUTEROL SULFATE 3 ML: .5; 2.5 SOLUTION RESPIRATORY (INHALATION) at 01:44

## 2023-01-01 RX ADMIN — INSULIN GLARGINE-YFGN 20 UNITS: 100 INJECTION, SOLUTION SUBCUTANEOUS at 05:20

## 2023-01-01 RX ADMIN — CEFEPIME 1 G: 1 INJECTION, POWDER, FOR SOLUTION INTRAMUSCULAR; INTRAVENOUS at 20:33

## 2023-01-01 RX ADMIN — NOREPINEPHRINE BITARTRATE 0.18 MCG/KG/MIN: 1 INJECTION, SOLUTION, CONCENTRATE INTRAVENOUS at 18:25

## 2023-01-01 RX ADMIN — PROPOFOL 80 MCG/KG/MIN: 10 INJECTION, EMULSION INTRAVENOUS at 02:53

## 2023-01-01 RX ADMIN — MINERAL OIL, PETROLATUM 1 APPLICATION: 425; 573 OINTMENT OPHTHALMIC at 14:23

## 2023-01-01 RX ADMIN — MIDAZOLAM HYDROCHLORIDE 5 MG: 1 INJECTION, SOLUTION INTRAMUSCULAR; INTRAVENOUS at 17:29

## 2023-01-01 RX ADMIN — PROPOFOL 80 MCG/KG/MIN: 10 INJECTION, EMULSION INTRAVENOUS at 04:26

## 2023-01-01 RX ADMIN — DOCUSATE SODIUM 50 MG AND SENNOSIDES 8.6 MG 2 TABLET: 8.6; 5 TABLET, FILM COATED ORAL at 17:13

## 2023-01-01 RX ADMIN — ROSUVASTATIN CALCIUM 10 MG: 10 TABLET, FILM COATED ORAL at 20:32

## 2023-01-01 RX ADMIN — FUROSEMIDE 80 MG: 10 INJECTION, SOLUTION INTRAMUSCULAR; INTRAVENOUS at 08:34

## 2023-01-01 RX ADMIN — HEPARIN SODIUM 2800 UNITS: 1000 INJECTION, SOLUTION INTRAVENOUS; SUBCUTANEOUS at 14:50

## 2023-01-01 RX ADMIN — CEFEPIME 2 G: 2 INJECTION, POWDER, FOR SOLUTION INTRAVENOUS at 21:49

## 2023-01-01 RX ADMIN — PROPOFOL 80 MCG/KG/MIN: 10 INJECTION, EMULSION INTRAVENOUS at 13:26

## 2023-01-01 RX ADMIN — UMECLIDINIUM BROMIDE AND VILANTEROL TRIFENATATE 1 PUFF: 62.5; 25 POWDER RESPIRATORY (INHALATION) at 07:18

## 2023-01-01 RX ADMIN — HEPARIN SODIUM 5000 UNITS: 5000 INJECTION, SOLUTION INTRAVENOUS; SUBCUTANEOUS at 21:44

## 2023-01-01 RX ADMIN — ROSUVASTATIN 20 MG: 10 TABLET, FILM COATED ORAL at 18:25

## 2023-01-01 RX ADMIN — Medication 600 MCG/HR: at 09:03

## 2023-01-01 RX ADMIN — ACETYLCYSTEINE 3 ML: 200 SOLUTION ORAL; RESPIRATORY (INHALATION) at 06:38

## 2023-01-01 RX ADMIN — ROCURONIUM BROMIDE 46.6 MG: 50 INJECTION INTRAVENOUS at 12:39

## 2023-01-01 RX ADMIN — SODIUM CHLORIDE 500 MG: 9 INJECTION, SOLUTION INTRAVENOUS at 08:46

## 2023-01-01 RX ADMIN — SODIUM CHLORIDE 8 UNITS/HR: 9 INJECTION, SOLUTION INTRAVENOUS at 08:29

## 2023-01-01 RX ADMIN — DOXYCYCLINE 100 MG: 100 INJECTION, POWDER, LYOPHILIZED, FOR SOLUTION INTRAVENOUS at 18:52

## 2023-01-01 RX ADMIN — POLYETHYLENE GLYCOL 3350 1 PACKET: 17 POWDER, FOR SOLUTION ORAL at 08:18

## 2023-01-01 RX ADMIN — HEPARIN SODIUM 5000 UNITS: 5000 INJECTION, SOLUTION INTRAVENOUS; SUBCUTANEOUS at 05:00

## 2023-01-01 RX ADMIN — Medication 400 MCG/HR: at 07:40

## 2023-01-01 RX ADMIN — Medication 600 MCG/HR: at 13:24

## 2023-01-01 RX ADMIN — IPRATROPIUM BROMIDE AND ALBUTEROL SULFATE 3 ML: .5; 2.5 SOLUTION RESPIRATORY (INHALATION) at 14:41

## 2023-01-01 RX ADMIN — PROPOFOL 80 MCG/KG/MIN: 10 INJECTION, EMULSION INTRAVENOUS at 15:19

## 2023-01-01 RX ADMIN — FAMOTIDINE 20 MG: 20 TABLET, FILM COATED ORAL at 17:29

## 2023-01-01 RX ADMIN — DOXYCYCLINE 100 MG: 100 INJECTION, POWDER, LYOPHILIZED, FOR SOLUTION INTRAVENOUS at 06:21

## 2023-01-01 RX ADMIN — IPRATROPIUM BROMIDE AND ALBUTEROL SULFATE 3 ML: .5; 2.5 SOLUTION RESPIRATORY (INHALATION) at 19:25

## 2023-01-01 RX ADMIN — ACETYLCYSTEINE 3 ML: 200 SOLUTION ORAL; RESPIRATORY (INHALATION) at 06:11

## 2023-01-01 RX ADMIN — METHYLPREDNISOLONE SODIUM SUCCINATE 40 MG: 40 INJECTION, POWDER, FOR SOLUTION INTRAMUSCULAR; INTRAVENOUS at 05:00

## 2023-01-01 RX ADMIN — DOXYCYCLINE 100 MG: 100 INJECTION, POWDER, LYOPHILIZED, FOR SOLUTION INTRAVENOUS at 17:05

## 2023-01-01 RX ADMIN — PROPOFOL 80 MCG/KG/MIN: 10 INJECTION, EMULSION INTRAVENOUS at 15:55

## 2023-01-01 RX ADMIN — Medication 150 MCG/HR: at 01:10

## 2023-01-01 RX ADMIN — FAMOTIDINE 20 MG: 10 INJECTION, SOLUTION INTRAVENOUS at 17:15

## 2023-01-01 RX ADMIN — PROPOFOL 80 MCG/KG/MIN: 10 INJECTION, EMULSION INTRAVENOUS at 02:45

## 2023-01-01 RX ADMIN — IPRATROPIUM BROMIDE AND ALBUTEROL SULFATE 3 ML: .5; 2.5 SOLUTION RESPIRATORY (INHALATION) at 03:23

## 2023-01-01 RX ADMIN — VECURONIUM BROMIDE 10 MG: 1 INJECTION, POWDER, LYOPHILIZED, FOR SOLUTION INTRAVENOUS at 11:14

## 2023-01-01 RX ADMIN — SODIUM CHLORIDE 8 UNITS/HR: 9 INJECTION, SOLUTION INTRAVENOUS at 21:58

## 2023-01-01 RX ADMIN — DOCUSATE SODIUM 50 MG AND SENNOSIDES 8.6 MG 2 TABLET: 8.6; 5 TABLET, FILM COATED ORAL at 20:32

## 2023-01-01 RX ADMIN — PROPOFOL 80 MCG/KG/MIN: 10 INJECTION, EMULSION INTRAVENOUS at 09:41

## 2023-01-01 RX ADMIN — INSULIN GLARGINE-YFGN 20 UNITS: 100 INJECTION, SOLUTION SUBCUTANEOUS at 17:23

## 2023-01-01 RX ADMIN — IPRATROPIUM BROMIDE AND ALBUTEROL SULFATE 3 ML: .5; 2.5 SOLUTION RESPIRATORY (INHALATION) at 19:36

## 2023-01-01 RX ADMIN — IPRATROPIUM BROMIDE AND ALBUTEROL SULFATE 3 ML: .5; 2.5 SOLUTION RESPIRATORY (INHALATION) at 10:42

## 2023-01-01 RX ADMIN — FUROSEMIDE 40 MG: 10 INJECTION, SOLUTION INTRAMUSCULAR; INTRAVENOUS at 16:09

## 2023-01-01 RX ADMIN — IPRATROPIUM BROMIDE AND ALBUTEROL SULFATE 3 ML: .5; 2.5 SOLUTION RESPIRATORY (INHALATION) at 10:39

## 2023-01-01 RX ADMIN — FUROSEMIDE 80 MG: 10 INJECTION, SOLUTION INTRAMUSCULAR; INTRAVENOUS at 13:26

## 2023-01-01 RX ADMIN — DOXYCYCLINE 100 MG: 100 INJECTION, POWDER, LYOPHILIZED, FOR SOLUTION INTRAVENOUS at 05:36

## 2023-01-01 RX ADMIN — SERTRALINE 100 MG: 50 TABLET, FILM COATED ORAL at 05:59

## 2023-01-01 RX ADMIN — POTASSIUM CHLORIDE 20 MEQ: 14.9 INJECTION, SOLUTION INTRAVENOUS at 11:55

## 2023-01-01 RX ADMIN — PROPOFOL 80 MCG/KG/MIN: 10 INJECTION, EMULSION INTRAVENOUS at 20:14

## 2023-01-01 RX ADMIN — Medication 600 MCG/HR: at 11:47

## 2023-01-01 RX ADMIN — FENTANYL CITRATE 100 MCG: 50 INJECTION, SOLUTION INTRAMUSCULAR; INTRAVENOUS at 01:10

## 2023-01-01 RX ADMIN — PROPOFOL 80 MCG/KG/MIN: 10 INJECTION, EMULSION INTRAVENOUS at 10:58

## 2023-01-01 RX ADMIN — DOCUSATE SODIUM 50 MG AND SENNOSIDES 8.6 MG 2 TABLET: 8.6; 5 TABLET, FILM COATED ORAL at 17:35

## 2023-01-01 RX ADMIN — CEFEPIME 2 G: 2 INJECTION, POWDER, FOR SOLUTION INTRAVENOUS at 12:10

## 2023-01-01 RX ADMIN — POTASSIUM CHLORIDE 10 MEQ: 7.46 INJECTION, SOLUTION INTRAVENOUS at 16:43

## 2023-01-01 RX ADMIN — FENTANYL CITRATE 100 MCG: 50 INJECTION, SOLUTION INTRAMUSCULAR; INTRAVENOUS at 09:50

## 2023-01-01 RX ADMIN — PROPOFOL 80 MCG/KG/MIN: 10 INJECTION, EMULSION INTRAVENOUS at 12:44

## 2023-01-01 RX ADMIN — INSULIN HUMAN 3 UNITS: 100 INJECTION, SOLUTION PARENTERAL at 05:21

## 2023-01-01 RX ADMIN — METHYLPREDNISOLONE SODIUM SUCCINATE 80 MG: 40 INJECTION, POWDER, FOR SOLUTION INTRAMUSCULAR; INTRAVENOUS at 14:45

## 2023-01-01 RX ADMIN — MINERAL OIL, PETROLATUM 1 APPLICATION: 425; 573 OINTMENT OPHTHALMIC at 21:44

## 2023-01-01 RX ADMIN — DOCUSATE SODIUM 50 MG AND SENNOSIDES 8.6 MG 2 TABLET: 8.6; 5 TABLET, FILM COATED ORAL at 04:36

## 2023-01-01 RX ADMIN — NOREPINEPHRINE BITARTRATE 0.11 MCG/KG/MIN: 1 INJECTION, SOLUTION, CONCENTRATE INTRAVENOUS at 22:46

## 2023-01-01 RX ADMIN — INSULIN GLARGINE-YFGN 20 UNITS: 100 INJECTION, SOLUTION SUBCUTANEOUS at 07:09

## 2023-01-01 RX ADMIN — ROCURONIUM BROMIDE 100 MG: 10 INJECTION, SOLUTION INTRAVENOUS at 08:10

## 2023-01-01 RX ADMIN — IPRATROPIUM BROMIDE AND ALBUTEROL SULFATE 3 ML: .5; 2.5 SOLUTION RESPIRATORY (INHALATION) at 19:11

## 2023-01-01 RX ADMIN — ACETYLCYSTEINE 3 ML: 200 SOLUTION ORAL; RESPIRATORY (INHALATION) at 04:00

## 2023-01-01 RX ADMIN — METHYLPREDNISOLONE SODIUM SUCCINATE 80 MG: 40 INJECTION, POWDER, FOR SOLUTION INTRAMUSCULAR; INTRAVENOUS at 14:29

## 2023-01-01 RX ADMIN — Medication 5 MG: at 21:15

## 2023-01-01 RX ADMIN — FUROSEMIDE 80 MG: 10 INJECTION, SOLUTION INTRAMUSCULAR; INTRAVENOUS at 08:14

## 2023-01-01 RX ADMIN — SODIUM CHLORIDE 250 MG: 9 INJECTION, SOLUTION INTRAVENOUS at 18:30

## 2023-01-01 RX ADMIN — ROCURONIUM BROMIDE 46.6 MG: 10 INJECTION, SOLUTION INTRAVENOUS at 19:32

## 2023-01-01 RX ADMIN — PROPOFOL 80 MCG/KG/MIN: 10 INJECTION, EMULSION INTRAVENOUS at 12:51

## 2023-01-01 RX ADMIN — PROPOFOL 80 MCG/KG/MIN: 10 INJECTION, EMULSION INTRAVENOUS at 00:20

## 2023-01-01 RX ADMIN — Medication 600 MCG/HR: at 01:49

## 2023-01-01 RX ADMIN — FENTANYL CITRATE 100 MCG: 50 INJECTION, SOLUTION INTRAMUSCULAR; INTRAVENOUS at 01:40

## 2023-01-01 RX ADMIN — SODIUM PHOSPHATE 133 ML: 7; 19 ENEMA RECTAL at 11:55

## 2023-01-01 RX ADMIN — IPRATROPIUM BROMIDE AND ALBUTEROL SULFATE 3 ML: .5; 2.5 SOLUTION RESPIRATORY (INHALATION) at 22:34

## 2023-01-01 RX ADMIN — ENOXAPARIN SODIUM 40 MG: 40 INJECTION SUBCUTANEOUS at 06:00

## 2023-01-01 RX ADMIN — FAMOTIDINE 20 MG: 10 INJECTION, SOLUTION INTRAVENOUS at 05:23

## 2023-01-01 RX ADMIN — DOXYCYCLINE 100 MG: 100 INJECTION, POWDER, LYOPHILIZED, FOR SOLUTION INTRAVENOUS at 17:34

## 2023-01-01 RX ADMIN — ROCURONIUM BROMIDE 10 MCG/KG/MIN: 10 INJECTION, SOLUTION INTRAVENOUS at 05:05

## 2023-01-01 RX ADMIN — ACETYLCYSTEINE 3 ML: 200 SOLUTION ORAL; RESPIRATORY (INHALATION) at 22:27

## 2023-01-01 RX ADMIN — BENAZEPRIL HYDROCHLORIDE 40 MG: 20 TABLET ORAL at 05:12

## 2023-01-01 RX ADMIN — Medication 600 MCG/HR: at 22:26

## 2023-01-01 RX ADMIN — FAMOTIDINE 20 MG: 20 TABLET, FILM COATED ORAL at 05:00

## 2023-01-01 RX ADMIN — HEPARIN SODIUM 5000 UNITS: 5000 INJECTION, SOLUTION INTRAVENOUS; SUBCUTANEOUS at 04:38

## 2023-01-01 RX ADMIN — IPRATROPIUM BROMIDE AND ALBUTEROL SULFATE 3 ML: .5; 2.5 SOLUTION RESPIRATORY (INHALATION) at 16:31

## 2023-01-01 RX ADMIN — METHYLPREDNISOLONE SODIUM SUCCINATE 40 MG: 40 INJECTION, POWDER, FOR SOLUTION INTRAMUSCULAR; INTRAVENOUS at 05:23

## 2023-01-01 RX ADMIN — SODIUM CHLORIDE 250 MG: 9 INJECTION, SOLUTION INTRAVENOUS at 17:24

## 2023-01-01 RX ADMIN — PIPERACILLIN AND TAZOBACTAM 3.38 G: 3; .375 INJECTION, POWDER, LYOPHILIZED, FOR SOLUTION INTRAVENOUS; PARENTERAL at 20:37

## 2023-01-01 RX ADMIN — LINEZOLID 600 MG: 600 INJECTION, SOLUTION INTRAVENOUS at 18:10

## 2023-01-01 RX ADMIN — FENTANYL CITRATE 100 MCG: 50 INJECTION, SOLUTION INTRAMUSCULAR; INTRAVENOUS at 16:57

## 2023-01-01 RX ADMIN — FUROSEMIDE 40 MG: 10 INJECTION, SOLUTION INTRAMUSCULAR; INTRAVENOUS at 09:19

## 2023-01-01 RX ADMIN — METHYLPREDNISOLONE SODIUM SUCCINATE 80 MG: 40 INJECTION, POWDER, FOR SOLUTION INTRAMUSCULAR; INTRAVENOUS at 05:08

## 2023-01-01 RX ADMIN — SODIUM CHLORIDE 250 MG: 9 INJECTION, SOLUTION INTRAVENOUS at 08:24

## 2023-01-01 RX ADMIN — ENOXAPARIN SODIUM 40 MG: 40 INJECTION SUBCUTANEOUS at 17:13

## 2023-01-01 RX ADMIN — IPRATROPIUM BROMIDE AND ALBUTEROL SULFATE 3 ML: .5; 2.5 SOLUTION RESPIRATORY (INHALATION) at 06:11

## 2023-01-01 RX ADMIN — ACETYLCYSTEINE 3 ML: 200 SOLUTION ORAL; RESPIRATORY (INHALATION) at 15:07

## 2023-01-01 RX ADMIN — MINERAL OIL, PETROLATUM 1 APPLICATION: 425; 573 OINTMENT OPHTHALMIC at 05:19

## 2023-01-01 RX ADMIN — Medication 400 MCG/HR: at 16:08

## 2023-01-01 RX ADMIN — METHYLPREDNISOLONE SODIUM SUCCINATE 80 MG: 40 INJECTION, POWDER, FOR SOLUTION INTRAMUSCULAR; INTRAVENOUS at 05:15

## 2023-01-01 RX ADMIN — MINERAL OIL, PETROLATUM 1 APPLICATION: 425; 573 OINTMENT OPHTHALMIC at 14:58

## 2023-01-01 RX ADMIN — IPRATROPIUM BROMIDE AND ALBUTEROL SULFATE 3 ML: .5; 2.5 SOLUTION RESPIRATORY (INHALATION) at 06:21

## 2023-01-01 RX ADMIN — IPRATROPIUM BROMIDE AND ALBUTEROL SULFATE 3 ML: .5; 2.5 SOLUTION RESPIRATORY (INHALATION) at 11:18

## 2023-01-01 RX ADMIN — IPRATROPIUM BROMIDE AND ALBUTEROL SULFATE 3 ML: .5; 2.5 SOLUTION RESPIRATORY (INHALATION) at 19:28

## 2023-01-01 RX ADMIN — PROPOFOL 20 MG: 10 INJECTION, EMULSION INTRAVENOUS at 08:45

## 2023-01-01 RX ADMIN — SODIUM CHLORIDE 7 UNITS/HR: 9 INJECTION, SOLUTION INTRAVENOUS at 09:12

## 2023-01-01 RX ADMIN — FAMOTIDINE 20 MG: 20 TABLET, FILM COATED ORAL at 06:27

## 2023-01-01 RX ADMIN — BENAZEPRIL HYDROCHLORIDE 40 MG: 20 TABLET ORAL at 05:36

## 2023-01-01 RX ADMIN — FUROSEMIDE 40 MG: 20 TABLET ORAL at 22:06

## 2023-01-01 RX ADMIN — IPRATROPIUM BROMIDE AND ALBUTEROL SULFATE 3 ML: .5; 2.5 SOLUTION RESPIRATORY (INHALATION) at 14:09

## 2023-01-01 RX ADMIN — PROPOFOL 80 MCG/KG/MIN: 10 INJECTION, EMULSION INTRAVENOUS at 02:15

## 2023-01-01 RX ADMIN — AMOXICILLIN AND CLAVULANATE POTASSIUM 1 TABLET: 875; 125 TABLET, FILM COATED ORAL at 06:31

## 2023-01-01 RX ADMIN — Medication 5 MG: at 21:35

## 2023-01-01 RX ADMIN — ENOXAPARIN SODIUM 40 MG: 40 INJECTION SUBCUTANEOUS at 17:32

## 2023-01-01 RX ADMIN — HEPARIN SODIUM 5000 UNITS: 5000 INJECTION, SOLUTION INTRAVENOUS; SUBCUTANEOUS at 06:27

## 2023-01-01 RX ADMIN — Medication 600 MCG/HR: at 20:22

## 2023-01-01 RX ADMIN — IPRATROPIUM BROMIDE AND ALBUTEROL SULFATE 3 ML: .5; 2.5 SOLUTION RESPIRATORY (INHALATION) at 22:12

## 2023-01-01 RX ADMIN — CEFEPIME 2 G: 2 INJECTION, POWDER, FOR SOLUTION INTRAVENOUS at 05:10

## 2023-01-01 RX ADMIN — INSULIN HUMAN 3 UNITS: 100 INJECTION, SOLUTION PARENTERAL at 23:45

## 2023-01-01 RX ADMIN — FENTANYL CITRATE 100 MCG: 50 INJECTION, SOLUTION INTRAMUSCULAR; INTRAVENOUS at 08:09

## 2023-01-01 RX ADMIN — Medication 600 MCG/HR: at 13:37

## 2023-01-01 RX ADMIN — Medication 600 MCG/HR: at 16:38

## 2023-01-01 RX ADMIN — PROPOFOL 80 MCG/KG/MIN: 10 INJECTION, EMULSION INTRAVENOUS at 10:04

## 2023-01-01 RX ADMIN — POLYETHYLENE GLYCOL 3350, SODIUM SULFATE ANHYDROUS, SODIUM BICARBONATE, SODIUM CHLORIDE, POTASSIUM CHLORIDE 2 L: 236; 22.74; 6.74; 5.86; 2.97 POWDER, FOR SOLUTION ORAL at 12:36

## 2023-01-01 RX ADMIN — HEPARIN SODIUM 2800 UNITS: 1000 INJECTION, SOLUTION INTRAVENOUS; SUBCUTANEOUS at 12:00

## 2023-01-01 RX ADMIN — IPRATROPIUM BROMIDE AND ALBUTEROL SULFATE 3 ML: .5; 2.5 SOLUTION RESPIRATORY (INHALATION) at 07:15

## 2023-01-01 RX ADMIN — FENTANYL CITRATE 50 MCG: 50 INJECTION, SOLUTION INTRAMUSCULAR; INTRAVENOUS at 01:07

## 2023-01-01 RX ADMIN — AMLODIPINE BESYLATE 10 MG: 10 TABLET ORAL at 05:14

## 2023-01-01 RX ADMIN — FUROSEMIDE 40 MG: 10 INJECTION, SOLUTION INTRAMUSCULAR; INTRAVENOUS at 06:44

## 2023-01-01 RX ADMIN — PROPOFOL 80 MCG/KG/MIN: 10 INJECTION, EMULSION INTRAVENOUS at 15:45

## 2023-01-01 RX ADMIN — IPRATROPIUM BROMIDE AND ALBUTEROL SULFATE 3 ML: .5; 2.5 SOLUTION RESPIRATORY (INHALATION) at 18:55

## 2023-01-01 RX ADMIN — CEFEPIME 2 G: 2 INJECTION, POWDER, FOR SOLUTION INTRAVENOUS at 05:30

## 2023-01-01 RX ADMIN — ACETYLCYSTEINE 3 ML: 200 SOLUTION ORAL; RESPIRATORY (INHALATION) at 18:42

## 2023-01-01 RX ADMIN — PROPOFOL 80 MCG/KG/MIN: 10 INJECTION, EMULSION INTRAVENOUS at 17:40

## 2023-01-01 RX ADMIN — METHYLPREDNISOLONE SODIUM SUCCINATE 40 MG: 40 INJECTION, POWDER, FOR SOLUTION INTRAMUSCULAR; INTRAVENOUS at 05:06

## 2023-01-01 RX ADMIN — Medication 5 MG: at 21:29

## 2023-01-01 RX ADMIN — IPRATROPIUM BROMIDE AND ALBUTEROL SULFATE 3 ML: .5; 2.5 SOLUTION RESPIRATORY (INHALATION) at 06:35

## 2023-01-01 RX ADMIN — FUROSEMIDE 40 MG: 10 INJECTION, SOLUTION INTRAMUSCULAR; INTRAVENOUS at 05:15

## 2023-01-01 RX ADMIN — IPRATROPIUM BROMIDE AND ALBUTEROL SULFATE 3 ML: .5; 2.5 SOLUTION RESPIRATORY (INHALATION) at 03:59

## 2023-01-01 RX ADMIN — PROPOFOL 80 MCG/KG/MIN: 10 INJECTION, EMULSION INTRAVENOUS at 08:23

## 2023-01-01 RX ADMIN — ACETYLCYSTEINE 3 ML: 200 SOLUTION ORAL; RESPIRATORY (INHALATION) at 07:30

## 2023-01-01 RX ADMIN — NOREPINEPHRINE BITARTRATE 0.16 MCG/KG/MIN: 1 INJECTION, SOLUTION, CONCENTRATE INTRAVENOUS at 15:55

## 2023-01-01 RX ADMIN — SODIUM CHLORIDE 250 MG: 9 INJECTION, SOLUTION INTRAVENOUS at 12:31

## 2023-01-01 RX ADMIN — PROPOFOL 80 MCG/KG/MIN: 10 INJECTION, EMULSION INTRAVENOUS at 07:40

## 2023-01-01 RX ADMIN — ROCURONIUM BROMIDE 6 MCG/KG/MIN: 10 INJECTION, SOLUTION INTRAVENOUS at 09:49

## 2023-01-01 RX ADMIN — MINERAL OIL, PETROLATUM 1 APPLICATION: 425; 573 OINTMENT OPHTHALMIC at 13:27

## 2023-01-01 RX ADMIN — Medication 500 MCG/HR: at 09:20

## 2023-01-01 RX ADMIN — METHYLPREDNISOLONE SODIUM SUCCINATE 80 MG: 40 INJECTION, POWDER, FOR SOLUTION INTRAMUSCULAR; INTRAVENOUS at 06:32

## 2023-01-01 RX ADMIN — DOCUSATE SODIUM 50 MG AND SENNOSIDES 8.6 MG 2 TABLET: 8.6; 5 TABLET, FILM COATED ORAL at 06:27

## 2023-01-01 RX ADMIN — FUROSEMIDE 80 MG: 10 INJECTION, SOLUTION INTRAMUSCULAR; INTRAVENOUS at 12:44

## 2023-01-01 RX ADMIN — METHYLPREDNISOLONE SODIUM SUCCINATE 40 MG: 40 INJECTION, POWDER, FOR SOLUTION INTRAMUSCULAR; INTRAVENOUS at 18:02

## 2023-01-01 RX ADMIN — Medication 600 MCG/HR: at 08:17

## 2023-01-01 RX ADMIN — MIDAZOLAM HYDROCHLORIDE 2 MG: 1 INJECTION, SOLUTION INTRAMUSCULAR; INTRAVENOUS at 10:12

## 2023-01-01 RX ADMIN — IPRATROPIUM BROMIDE AND ALBUTEROL SULFATE 3 ML: .5; 2.5 SOLUTION RESPIRATORY (INHALATION) at 06:17

## 2023-01-01 RX ADMIN — ROCURONIUM BROMIDE 8 MCG/KG/MIN: 10 INJECTION, SOLUTION INTRAVENOUS at 17:44

## 2023-01-01 RX ADMIN — INSULIN HUMAN 1 UNITS: 100 INJECTION, SOLUTION PARENTERAL at 10:23

## 2023-01-01 RX ADMIN — CEFEPIME 2 G: 2 INJECTION, POWDER, FOR SOLUTION INTRAVENOUS at 18:06

## 2023-01-01 RX ADMIN — UMECLIDINIUM BROMIDE AND VILANTEROL TRIFENATATE 1 PUFF: 62.5; 25 POWDER RESPIRATORY (INHALATION) at 07:31

## 2023-01-01 RX ADMIN — CEFEPIME 2 G: 2 INJECTION, POWDER, FOR SOLUTION INTRAVENOUS at 15:39

## 2023-01-01 RX ADMIN — IPRATROPIUM BROMIDE AND ALBUTEROL SULFATE 3 ML: .5; 2.5 SOLUTION RESPIRATORY (INHALATION) at 21:29

## 2023-01-01 RX ADMIN — IPRATROPIUM BROMIDE AND ALBUTEROL SULFATE 3 ML: .5; 2.5 SOLUTION RESPIRATORY (INHALATION) at 15:00

## 2023-01-01 RX ADMIN — FAMOTIDINE 20 MG: 20 TABLET, FILM COATED ORAL at 18:00

## 2023-01-01 RX ADMIN — Medication 600 MCG/HR: at 06:07

## 2023-01-01 RX ADMIN — ROSUVASTATIN CALCIUM 20 MG: 10 TABLET, FILM COATED ORAL at 17:13

## 2023-01-01 RX ADMIN — Medication 600 MCG/HR: at 08:33

## 2023-01-01 RX ADMIN — MINERAL OIL, PETROLATUM 1 APPLICATION: 425; 573 OINTMENT OPHTHALMIC at 21:50

## 2023-01-01 RX ADMIN — MIDAZOLAM HYDROCHLORIDE 5 MG: 1 INJECTION, SOLUTION INTRAMUSCULAR; INTRAVENOUS at 17:20

## 2023-01-01 RX ADMIN — PROPOFOL 80 MCG/KG/MIN: 10 INJECTION, EMULSION INTRAVENOUS at 07:02

## 2023-01-01 RX ADMIN — PROPOFOL 80 MCG/KG/MIN: 10 INJECTION, EMULSION INTRAVENOUS at 16:58

## 2023-01-01 RX ADMIN — NOREPINEPHRINE BITARTRATE 0.05 MCG/KG/MIN: 1 INJECTION, SOLUTION, CONCENTRATE INTRAVENOUS at 15:07

## 2023-01-01 RX ADMIN — SODIUM CHLORIDE 250 MG: 9 INJECTION, SOLUTION INTRAVENOUS at 23:44

## 2023-01-01 RX ADMIN — PROPOFOL 20 MG: 10 INJECTION, EMULSION INTRAVENOUS at 09:30

## 2023-01-01 RX ADMIN — PROPOFOL 80 MCG/KG/MIN: 10 INJECTION, EMULSION INTRAVENOUS at 11:00

## 2023-01-01 RX ADMIN — CEFEPIME 2 G: 2 INJECTION, POWDER, FOR SOLUTION INTRAVENOUS at 21:10

## 2023-01-01 RX ADMIN — LACTULOSE 30 ML: 10 SOLUTION ORAL at 18:02

## 2023-01-01 RX ADMIN — IPRATROPIUM BROMIDE AND ALBUTEROL SULFATE 3 ML: .5; 2.5 SOLUTION RESPIRATORY (INHALATION) at 19:07

## 2023-01-01 RX ADMIN — INSULIN HUMAN 4 UNITS: 100 INJECTION, SOLUTION PARENTERAL at 14:14

## 2023-01-01 RX ADMIN — IPRATROPIUM BROMIDE AND ALBUTEROL SULFATE 3 ML: .5; 2.5 SOLUTION RESPIRATORY (INHALATION) at 03:27

## 2023-01-01 RX ADMIN — PROPOFOL 80 MCG/KG/MIN: 10 INJECTION, EMULSION INTRAVENOUS at 05:40

## 2023-01-01 RX ADMIN — DOXYCYCLINE 100 MG: 100 INJECTION, POWDER, LYOPHILIZED, FOR SOLUTION INTRAVENOUS at 17:15

## 2023-01-01 RX ADMIN — SERTRALINE 100 MG: 50 TABLET, FILM COATED ORAL at 05:15

## 2023-01-01 RX ADMIN — SODIUM CHLORIDE, POTASSIUM CHLORIDE, SODIUM LACTATE AND CALCIUM CHLORIDE 500 ML: 600; 310; 30; 20 INJECTION, SOLUTION INTRAVENOUS at 15:58

## 2023-01-01 RX ADMIN — BENAZEPRIL HYDROCHLORIDE 40 MG: 20 TABLET ORAL at 06:34

## 2023-01-01 RX ADMIN — IPRATROPIUM BROMIDE AND ALBUTEROL SULFATE 3 ML: .5; 2.5 SOLUTION RESPIRATORY (INHALATION) at 23:14

## 2023-01-01 RX ADMIN — METHYLPREDNISOLONE SODIUM SUCCINATE 62.5 MG: 125 INJECTION, POWDER, FOR SOLUTION INTRAMUSCULAR; INTRAVENOUS at 11:12

## 2023-01-01 RX ADMIN — IPRATROPIUM BROMIDE AND ALBUTEROL SULFATE 3 ML: .5; 2.5 SOLUTION RESPIRATORY (INHALATION) at 06:25

## 2023-01-01 RX ADMIN — IPRATROPIUM BROMIDE AND ALBUTEROL SULFATE 3 ML: .5; 2.5 SOLUTION RESPIRATORY (INHALATION) at 23:22

## 2023-01-01 RX ADMIN — METHYLPREDNISOLONE SODIUM SUCCINATE 80 MG: 40 INJECTION, POWDER, FOR SOLUTION INTRAMUSCULAR; INTRAVENOUS at 21:50

## 2023-01-01 RX ADMIN — IPRATROPIUM BROMIDE AND ALBUTEROL SULFATE 3 ML: .5; 2.5 SOLUTION RESPIRATORY (INHALATION) at 03:29

## 2023-01-01 RX ADMIN — ACETYLCYSTEINE 3 ML: 200 SOLUTION ORAL; RESPIRATORY (INHALATION) at 14:43

## 2023-01-01 RX ADMIN — ROSUVASTATIN CALCIUM 20 MG: 10 TABLET, FILM COATED ORAL at 17:35

## 2023-01-01 RX ADMIN — MIDAZOLAM HYDROCHLORIDE 5 MG: 1 INJECTION, SOLUTION INTRAMUSCULAR; INTRAVENOUS at 16:16

## 2023-01-01 RX ADMIN — IPRATROPIUM BROMIDE AND ALBUTEROL SULFATE 3 ML: .5; 2.5 SOLUTION RESPIRATORY (INHALATION) at 22:02

## 2023-01-01 RX ADMIN — PROPOFOL 80 MCG/KG/MIN: 10 INJECTION, EMULSION INTRAVENOUS at 19:52

## 2023-01-01 RX ADMIN — METHYLPREDNISOLONE SODIUM SUCCINATE 80 MG: 40 INJECTION, POWDER, FOR SOLUTION INTRAMUSCULAR; INTRAVENOUS at 14:00

## 2023-01-01 RX ADMIN — INSULIN HUMAN 3 UNITS: 100 INJECTION, SOLUTION PARENTERAL at 17:24

## 2023-01-01 RX ADMIN — PROPOFOL 80 MCG/KG/MIN: 10 INJECTION, EMULSION INTRAVENOUS at 16:11

## 2023-01-01 RX ADMIN — IPRATROPIUM BROMIDE AND ALBUTEROL SULFATE 3 ML: .5; 2.5 SOLUTION RESPIRATORY (INHALATION) at 23:32

## 2023-01-01 RX ADMIN — ACETYLCYSTEINE 3 ML: 200 SOLUTION ORAL; RESPIRATORY (INHALATION) at 18:56

## 2023-01-01 RX ADMIN — INSULIN GLARGINE-YFGN 20 UNITS: 100 INJECTION, SOLUTION SUBCUTANEOUS at 06:00

## 2023-01-01 RX ADMIN — ROCURONIUM BROMIDE 10 MCG/KG/MIN: 10 INJECTION, SOLUTION INTRAVENOUS at 12:18

## 2023-01-01 RX ADMIN — Medication 5 MG: at 22:31

## 2023-01-01 RX ADMIN — LACTULOSE 30 ML: 10 SOLUTION ORAL at 04:36

## 2023-01-01 RX ADMIN — METHYLPREDNISOLONE SODIUM SUCCINATE 80 MG: 40 INJECTION, POWDER, FOR SOLUTION INTRAMUSCULAR; INTRAVENOUS at 21:34

## 2023-01-01 RX ADMIN — DOCUSATE SODIUM 50 MG AND SENNOSIDES 8.6 MG 2 TABLET: 8.6; 5 TABLET, FILM COATED ORAL at 18:25

## 2023-01-01 RX ADMIN — CEFEPIME 2 G: 2 INJECTION, POWDER, FOR SOLUTION INTRAVENOUS at 06:22

## 2023-01-01 RX ADMIN — METHYLPREDNISOLONE SODIUM SUCCINATE 40 MG: 40 INJECTION, POWDER, FOR SOLUTION INTRAMUSCULAR; INTRAVENOUS at 04:38

## 2023-01-01 RX ADMIN — HEPARIN SODIUM 5000 UNITS: 5000 INJECTION, SOLUTION INTRAVENOUS; SUBCUTANEOUS at 22:24

## 2023-01-01 RX ADMIN — INSULIN HUMAN 5 UNITS: 100 INJECTION, SOLUTION PARENTERAL at 12:17

## 2023-01-01 RX ADMIN — PROPOFOL 80 MCG/KG/MIN: 10 INJECTION, EMULSION INTRAVENOUS at 03:49

## 2023-01-01 RX ADMIN — ROSUVASTATIN 20 MG: 10 TABLET, FILM COATED ORAL at 17:29

## 2023-01-01 RX ADMIN — MINERAL OIL, PETROLATUM 1 APPLICATION: 425; 573 OINTMENT OPHTHALMIC at 04:49

## 2023-01-01 RX ADMIN — METHYLPREDNISOLONE SODIUM SUCCINATE 80 MG: 40 INJECTION, POWDER, FOR SOLUTION INTRAMUSCULAR; INTRAVENOUS at 06:43

## 2023-01-01 RX ADMIN — ACETYLCYSTEINE 3 ML: 200 SOLUTION ORAL; RESPIRATORY (INHALATION) at 15:17

## 2023-01-01 RX ADMIN — METHYLPREDNISOLONE SODIUM SUCCINATE 80 MG: 40 INJECTION, POWDER, FOR SOLUTION INTRAMUSCULAR; INTRAVENOUS at 06:35

## 2023-01-01 RX ADMIN — SODIUM CHLORIDE 250 MG: 9 INJECTION, SOLUTION INTRAVENOUS at 02:12

## 2023-01-01 RX ADMIN — NOREPINEPHRINE BITARTRATE 0.03 MCG/KG/MIN: 1 INJECTION, SOLUTION, CONCENTRATE INTRAVENOUS at 00:27

## 2023-01-01 RX ADMIN — SERTRALINE 100 MG: 50 TABLET, FILM COATED ORAL at 06:44

## 2023-01-01 RX ADMIN — IPRATROPIUM BROMIDE AND ALBUTEROL SULFATE 3 ML: .5; 2.5 SOLUTION RESPIRATORY (INHALATION) at 02:18

## 2023-01-01 RX ADMIN — VASOPRESSIN 0.03 UNITS/MIN: 20 INJECTION INTRAVENOUS at 04:50

## 2023-01-01 RX ADMIN — IPRATROPIUM BROMIDE AND ALBUTEROL SULFATE 3 ML: .5; 2.5 SOLUTION RESPIRATORY (INHALATION) at 10:51

## 2023-01-01 RX ADMIN — PROPOFOL 80 MCG/KG/MIN: 10 INJECTION, EMULSION INTRAVENOUS at 10:59

## 2023-01-01 RX ADMIN — ACETYLCYSTEINE 3 ML: 200 SOLUTION ORAL; RESPIRATORY (INHALATION) at 06:35

## 2023-01-01 RX ADMIN — PROPOFOL 80 MCG/KG/MIN: 10 INJECTION, EMULSION INTRAVENOUS at 23:33

## 2023-01-01 RX ADMIN — PROPOFOL 80 MCG/KG/MIN: 10 INJECTION, EMULSION INTRAVENOUS at 08:09

## 2023-01-01 RX ADMIN — NOREPINEPHRINE BITARTRATE 0.35 MCG/KG/MIN: 1 INJECTION, SOLUTION, CONCENTRATE INTRAVENOUS at 08:19

## 2023-01-01 RX ADMIN — PROPOFOL 80 MCG/KG/MIN: 10 INJECTION, EMULSION INTRAVENOUS at 02:36

## 2023-01-01 RX ADMIN — DOCUSATE SODIUM 50 MG AND SENNOSIDES 8.6 MG 2 TABLET: 8.6; 5 TABLET, FILM COATED ORAL at 18:02

## 2023-01-01 RX ADMIN — HEPARIN SODIUM 5000 UNITS: 5000 INJECTION, SOLUTION INTRAVENOUS; SUBCUTANEOUS at 13:26

## 2023-01-01 RX ADMIN — SERTRALINE 100 MG: 50 TABLET, FILM COATED ORAL at 06:26

## 2023-01-01 RX ADMIN — FUROSEMIDE 80 MG: 10 INJECTION, SOLUTION INTRAMUSCULAR; INTRAVENOUS at 20:33

## 2023-01-01 RX ADMIN — PROPOFOL 80 MCG/KG/MIN: 10 INJECTION, EMULSION INTRAVENOUS at 11:03

## 2023-01-01 RX ADMIN — PROPOFOL 80 MCG/KG/MIN: 10 INJECTION, EMULSION INTRAVENOUS at 00:40

## 2023-01-01 RX ADMIN — DOCUSATE SODIUM 50 MG AND SENNOSIDES 8.6 MG 2 TABLET: 8.6; 5 TABLET, FILM COATED ORAL at 05:07

## 2023-01-01 RX ADMIN — PROPOFOL 80 MCG/KG/MIN: 10 INJECTION, EMULSION INTRAVENOUS at 15:24

## 2023-01-01 RX ADMIN — PROPOFOL 80 MCG/KG/MIN: 10 INJECTION, EMULSION INTRAVENOUS at 18:11

## 2023-01-01 RX ADMIN — IPRATROPIUM BROMIDE AND ALBUTEROL SULFATE 3 ML: .5; 2.5 SOLUTION RESPIRATORY (INHALATION) at 18:43

## 2023-01-01 RX ADMIN — Medication 300 MCG/HR: at 04:12

## 2023-01-01 RX ADMIN — IPRATROPIUM BROMIDE AND ALBUTEROL SULFATE 3 ML: .5; 2.5 SOLUTION RESPIRATORY (INHALATION) at 14:44

## 2023-01-01 RX ADMIN — INSULIN HUMAN 2 UNITS: 100 INJECTION, SOLUTION PARENTERAL at 06:43

## 2023-01-01 RX ADMIN — PROPOFOL 80 MCG/KG/MIN: 10 INJECTION, EMULSION INTRAVENOUS at 17:35

## 2023-01-01 RX ADMIN — HEPARIN SODIUM 5000 UNITS: 5000 INJECTION, SOLUTION INTRAVENOUS; SUBCUTANEOUS at 05:23

## 2023-01-01 RX ADMIN — ACETAMINOPHEN 650 MG: 325 TABLET, FILM COATED ORAL at 13:30

## 2023-01-01 RX ADMIN — PROPOFOL 80 MCG/KG/MIN: 10 INJECTION, EMULSION INTRAVENOUS at 05:06

## 2023-01-01 RX ADMIN — NOREPINEPHRINE BITARTRATE 0.05 MCG/KG/MIN: 1 INJECTION INTRAVENOUS at 09:24

## 2023-01-01 RX ADMIN — ROCURONIUM BROMIDE 46.6 MG: 50 INJECTION INTRAVENOUS at 00:55

## 2023-01-01 RX ADMIN — AMLODIPINE BESYLATE 10 MG: 10 TABLET ORAL at 05:06

## 2023-01-01 RX ADMIN — INSULIN HUMAN 2 UNITS: 100 INJECTION, SOLUTION PARENTERAL at 06:00

## 2023-01-01 RX ADMIN — FAMOTIDINE 20 MG: 10 INJECTION, SOLUTION INTRAVENOUS at 05:07

## 2023-01-01 RX ADMIN — PROPOFOL 80 MCG/KG/MIN: 10 INJECTION, EMULSION INTRAVENOUS at 13:55

## 2023-01-01 RX ADMIN — ACETYLCYSTEINE 3 ML: 200 SOLUTION ORAL; RESPIRATORY (INHALATION) at 02:31

## 2023-01-01 RX ADMIN — IPRATROPIUM BROMIDE AND ALBUTEROL SULFATE 3 ML: .5; 2.5 SOLUTION RESPIRATORY (INHALATION) at 10:20

## 2023-01-01 RX ADMIN — DOCUSATE SODIUM 50 MG AND SENNOSIDES 8.6 MG 2 TABLET: 8.6; 5 TABLET, FILM COATED ORAL at 06:00

## 2023-01-01 RX ADMIN — Medication 200 MCG: at 08:55

## 2023-01-01 RX ADMIN — FENTANYL CITRATE 100 MCG: 50 INJECTION, SOLUTION INTRAMUSCULAR; INTRAVENOUS at 20:23

## 2023-01-01 RX ADMIN — PROPOFOL 30 MG: 10 INJECTION, EMULSION INTRAVENOUS at 08:30

## 2023-01-01 RX ADMIN — INSULIN GLARGINE-YFGN 15 UNITS: 100 INJECTION, SOLUTION SUBCUTANEOUS at 06:42

## 2023-01-01 RX ADMIN — PIPERACILLIN AND TAZOBACTAM 3.38 G: 3; .375 INJECTION, POWDER, LYOPHILIZED, FOR SOLUTION INTRAVENOUS; PARENTERAL at 04:26

## 2023-01-01 RX ADMIN — ROSUVASTATIN CALCIUM 10 MG: 10 TABLET, FILM COATED ORAL at 18:00

## 2023-01-01 RX ADMIN — SERTRALINE 100 MG: 50 TABLET, FILM COATED ORAL at 05:05

## 2023-01-01 RX ADMIN — AMPICILLIN AND SULBACTAM 3 G: 1; 2 INJECTION, POWDER, FOR SOLUTION INTRAMUSCULAR; INTRAVENOUS at 11:14

## 2023-01-01 RX ADMIN — MINERAL OIL, PETROLATUM 1 APPLICATION: 425; 573 OINTMENT OPHTHALMIC at 05:00

## 2023-01-01 RX ADMIN — PROPOFOL 80 MCG/KG/MIN: 10 INJECTION, EMULSION INTRAVENOUS at 21:00

## 2023-01-01 RX ADMIN — MINERAL OIL, PETROLATUM 1 APPLICATION: 425; 573 OINTMENT OPHTHALMIC at 13:23

## 2023-01-01 RX ADMIN — METHYLPREDNISOLONE SODIUM SUCCINATE 80 MG: 40 INJECTION, POWDER, FOR SOLUTION INTRAMUSCULAR; INTRAVENOUS at 21:29

## 2023-01-01 RX ADMIN — INSULIN HUMAN 2 UNITS: 100 INJECTION, SOLUTION PARENTERAL at 08:50

## 2023-01-01 RX ADMIN — VECURONIUM BROMIDE 1 MCG/KG/MIN: 20 INJECTION, POWDER, LYOPHILIZED, FOR SOLUTION INTRAVENOUS at 11:52

## 2023-01-01 RX ADMIN — HEPARIN SODIUM 5000 UNITS: 5000 INJECTION, SOLUTION INTRAVENOUS; SUBCUTANEOUS at 22:23

## 2023-01-01 RX ADMIN — SODIUM CHLORIDE 250 MG: 9 INJECTION, SOLUTION INTRAVENOUS at 05:59

## 2023-01-01 RX ADMIN — IPRATROPIUM BROMIDE AND ALBUTEROL SULFATE 3 ML: .5; 2.5 SOLUTION RESPIRATORY (INHALATION) at 23:38

## 2023-01-01 RX ADMIN — Medication 600 MCG/HR: at 05:35

## 2023-01-01 RX ADMIN — IPRATROPIUM BROMIDE AND ALBUTEROL SULFATE 3 ML: .5; 2.5 SOLUTION RESPIRATORY (INHALATION) at 10:08

## 2023-01-01 RX ADMIN — SERTRALINE 100 MG: 50 TABLET, FILM COATED ORAL at 05:00

## 2023-01-01 RX ADMIN — IPRATROPIUM BROMIDE AND ALBUTEROL SULFATE 3 ML: .5; 2.5 SOLUTION RESPIRATORY (INHALATION) at 19:00

## 2023-01-01 RX ADMIN — ROCURONIUM BROMIDE 46.6 MG: 50 INJECTION INTRAVENOUS at 01:21

## 2023-01-01 RX ADMIN — PROPOFOL 80 MCG/KG/MIN: 10 INJECTION, EMULSION INTRAVENOUS at 01:09

## 2023-01-01 RX ADMIN — CEFEPIME 2 G: 2 INJECTION, POWDER, FOR SOLUTION INTRAVENOUS at 05:12

## 2023-01-01 RX ADMIN — PROPOFOL 80 MCG/KG/MIN: 10 INJECTION, EMULSION INTRAVENOUS at 20:31

## 2023-01-01 RX ADMIN — SODIUM CHLORIDE 250 MG: 9 INJECTION, SOLUTION INTRAVENOUS at 17:38

## 2023-01-01 RX ADMIN — PROPOFOL 80 MCG/KG/MIN: 10 INJECTION, EMULSION INTRAVENOUS at 21:42

## 2023-01-01 RX ADMIN — FAMOTIDINE 20 MG: 10 INJECTION, SOLUTION INTRAVENOUS at 13:15

## 2023-01-01 RX ADMIN — Medication 600 MCG/HR: at 00:23

## 2023-01-01 RX ADMIN — FAMOTIDINE 20 MG: 20 TABLET, FILM COATED ORAL at 04:36

## 2023-01-01 RX ADMIN — ENOXAPARIN SODIUM 40 MG: 40 INJECTION SUBCUTANEOUS at 17:35

## 2023-01-01 RX ADMIN — ROCURONIUM BROMIDE 10 MCG/KG/MIN: 10 INJECTION, SOLUTION INTRAVENOUS at 18:17

## 2023-01-01 RX ADMIN — INSULIN HUMAN 2 UNITS: 100 INJECTION, SOLUTION PARENTERAL at 00:50

## 2023-01-01 RX ADMIN — FAMOTIDINE 20 MG: 20 TABLET, FILM COATED ORAL at 05:59

## 2023-01-01 RX ADMIN — UMECLIDINIUM BROMIDE AND VILANTEROL TRIFENATATE 1 PUFF: 62.5; 25 POWDER RESPIRATORY (INHALATION) at 07:07

## 2023-01-01 RX ADMIN — IPRATROPIUM BROMIDE AND ALBUTEROL SULFATE 3 ML: .5; 2.5 SOLUTION RESPIRATORY (INHALATION) at 10:02

## 2023-01-01 RX ADMIN — Medication 600 MCG/HR: at 17:38

## 2023-01-01 RX ADMIN — CEFEPIME 2 G: 2 INJECTION, POWDER, FOR SOLUTION INTRAVENOUS at 06:51

## 2023-01-01 RX ADMIN — NOREPINEPHRINE BITARTRATE 0.05 MCG/KG/MIN: 1 INJECTION, SOLUTION, CONCENTRATE INTRAVENOUS at 09:24

## 2023-01-01 RX ADMIN — Medication 600 MCG/HR: at 20:38

## 2023-01-01 RX ADMIN — PROPOFOL 40 MCG/KG/MIN: 10 INJECTION, EMULSION INTRAVENOUS at 08:16

## 2023-01-01 RX ADMIN — DOCUSATE SODIUM 50 MG AND SENNOSIDES 8.6 MG 2 TABLET: 8.6; 5 TABLET, FILM COATED ORAL at 17:15

## 2023-01-01 RX ADMIN — Medication 400 MCG/HR: at 01:12

## 2023-01-01 RX ADMIN — DOCUSATE SODIUM 50 MG AND SENNOSIDES 8.6 MG 2 TABLET: 8.6; 5 TABLET, FILM COATED ORAL at 17:32

## 2023-01-01 RX ADMIN — AMOXICILLIN AND CLAVULANATE POTASSIUM 1 TABLET: 875; 125 TABLET, FILM COATED ORAL at 06:44

## 2023-01-01 RX ADMIN — Medication 600 MCG/HR: at 02:58

## 2023-01-01 RX ADMIN — FUROSEMIDE 40 MG: 10 INJECTION, SOLUTION INTRAMUSCULAR; INTRAVENOUS at 05:07

## 2023-01-01 RX ADMIN — DOCUSATE SODIUM 50 MG AND SENNOSIDES 8.6 MG 2 TABLET: 8.6; 5 TABLET, FILM COATED ORAL at 06:32

## 2023-01-01 RX ADMIN — LACTULOSE 30 ML: 10 SOLUTION ORAL at 17:13

## 2023-01-01 RX ADMIN — LACTULOSE 30 ML: 10 SOLUTION ORAL at 13:22

## 2023-01-01 RX ADMIN — SODIUM CHLORIDE 250 MG: 9 INJECTION, SOLUTION INTRAVENOUS at 08:20

## 2023-01-01 RX ADMIN — DOCUSATE SODIUM 50 MG AND SENNOSIDES 8.6 MG 2 TABLET: 8.6; 5 TABLET, FILM COATED ORAL at 06:31

## 2023-01-01 RX ADMIN — ENOXAPARIN SODIUM 40 MG: 40 INJECTION SUBCUTANEOUS at 06:31

## 2023-01-01 RX ADMIN — FENTANYL CITRATE 100 MCG: 50 INJECTION, SOLUTION INTRAMUSCULAR; INTRAVENOUS at 10:11

## 2023-01-01 RX ADMIN — INSULIN HUMAN 5 UNITS: 100 INJECTION, SOLUTION PARENTERAL at 17:18

## 2023-01-01 RX ADMIN — IPRATROPIUM BROMIDE AND ALBUTEROL SULFATE 3 ML: .5; 2.5 SOLUTION RESPIRATORY (INHALATION) at 12:19

## 2023-01-01 RX ADMIN — ENOXAPARIN SODIUM 40 MG: 40 INJECTION SUBCUTANEOUS at 06:44

## 2023-01-01 RX ADMIN — ROSUVASTATIN 20 MG: 10 TABLET, FILM COATED ORAL at 17:19

## 2023-01-01 RX ADMIN — PROPOFOL 80 MCG/KG/MIN: 10 INJECTION, EMULSION INTRAVENOUS at 13:10

## 2023-01-01 RX ADMIN — FUROSEMIDE 80 MG: 10 INJECTION, SOLUTION INTRAMUSCULAR; INTRAVENOUS at 21:41

## 2023-01-01 RX ADMIN — Medication 500 MCG/HR: at 13:55

## 2023-01-01 RX ADMIN — AMPICILLIN AND SULBACTAM 3 G: 1; 2 INJECTION, POWDER, FOR SOLUTION INTRAMUSCULAR; INTRAVENOUS at 00:18

## 2023-01-01 RX ADMIN — LACTULOSE 30 ML: 10 SOLUTION ORAL at 05:00

## 2023-01-01 RX ADMIN — Medication 300 MCG: at 08:40

## 2023-01-01 RX ADMIN — HEPARIN SODIUM 5000 UNITS: 5000 INJECTION, SOLUTION INTRAVENOUS; SUBCUTANEOUS at 21:41

## 2023-01-01 RX ADMIN — MINERAL OIL, PETROLATUM 1 APPLICATION: 425; 573 OINTMENT OPHTHALMIC at 21:48

## 2023-01-01 RX ADMIN — PROPOFOL 80 MCG/KG/MIN: 10 INJECTION, EMULSION INTRAVENOUS at 21:37

## 2023-01-01 RX ADMIN — AMLODIPINE BESYLATE 10 MG: 10 TABLET ORAL at 06:45

## 2023-01-01 RX ADMIN — ETOMIDATE 30 MG: 2 INJECTION INTRAVENOUS at 08:09

## 2023-01-01 RX ADMIN — IPRATROPIUM BROMIDE AND ALBUTEROL SULFATE 3 ML: .5; 2.5 SOLUTION RESPIRATORY (INHALATION) at 02:13

## 2023-01-01 RX ADMIN — PROPOFOL 80 MCG/KG/MIN: 10 INJECTION, EMULSION INTRAVENOUS at 08:08

## 2023-01-01 RX ADMIN — FUROSEMIDE 80 MG: 10 INJECTION, SOLUTION INTRAMUSCULAR; INTRAVENOUS at 16:09

## 2023-01-01 RX ADMIN — LINEZOLID 600 MG: 600 TABLET, FILM COATED ORAL at 05:05

## 2023-01-01 RX ADMIN — IPRATROPIUM BROMIDE AND ALBUTEROL SULFATE 3 ML: .5; 2.5 SOLUTION RESPIRATORY (INHALATION) at 14:03

## 2023-01-01 RX ADMIN — Medication 600 MCG/HR: at 02:59

## 2023-01-01 RX ADMIN — ROCURONIUM BROMIDE 46.6 MG: 50 INJECTION INTRAVENOUS at 05:20

## 2023-01-01 RX ADMIN — METOPROLOL SUCCINATE 25 MG: 25 TABLET, EXTENDED RELEASE ORAL at 05:05

## 2023-01-01 RX ADMIN — MINERAL OIL, PETROLATUM 1 APPLICATION: 425; 573 OINTMENT OPHTHALMIC at 05:37

## 2023-01-01 ASSESSMENT — ENCOUNTER SYMPTOMS
DIARRHEA: 0
DEPRESSION: 0
VOMITING: 0
ORTHOPNEA: 0
HEADACHES: 0
WHEEZING: 0
ORTHOPNEA: 0
NAUSEA: 0
PALPITATIONS: 0
MYALGIAS: 1
NAUSEA: 0
ORTHOPNEA: 0
ABDOMINAL PAIN: 0
DIARRHEA: 0
PALPITATIONS: 0
HEARTBURN: 0
ABDOMINAL PAIN: 0
CONSTIPATION: 0
WEAKNESS: 1
SORE THROAT: 0
DIZZINESS: 0
VOMITING: 0
WHEEZING: 0
HEADACHES: 0
WHEEZING: 0
DIARRHEA: 0
HEADACHES: 0
HEARTBURN: 0
FEVER: 0
CHILLS: 0
HEADACHES: 0
CHILLS: 0
ORTHOPNEA: 0
PND: 0
DEPRESSION: 0
ABDOMINAL PAIN: 0
COUGH: 0
WHEEZING: 0
NAUSEA: 0
ORTHOPNEA: 0
HEARTBURN: 0
CHILLS: 0
SORE THROAT: 0
VOMITING: 0
DOUBLE VISION: 0
CHILLS: 0
ORTHOPNEA: 0
CLAUDICATION: 0
COUGH: 1
FEVER: 0
PND: 0
COUGH: 1
WHEEZING: 0
DEPRESSION: 0
NAUSEA: 0
PALPITATIONS: 0
CLAUDICATION: 0
COUGH: 0
SHORTNESS OF BREATH: 1
NAUSEA: 0
SPUTUM PRODUCTION: 1
MYALGIAS: 1
CLAUDICATION: 0
FEVER: 0
PALPITATIONS: 0
WEAKNESS: 1
SHORTNESS OF BREATH: 0
SORE THROAT: 0
HEARTBURN: 0
WEAKNESS: 1
CONSTIPATION: 0
SORE THROAT: 0
ORTHOPNEA: 0
CONSTIPATION: 0
DIZZINESS: 0
MYALGIAS: 1
FEVER: 0
FEVER: 0
PND: 0
COUGH: 0
CHILLS: 0
CLAUDICATION: 0
BLURRED VISION: 0
CONSTIPATION: 0
PALPITATIONS: 0
HEADACHES: 0
VOMITING: 0
ORTHOPNEA: 0
HEARTBURN: 0
DEPRESSION: 0
SPUTUM PRODUCTION: 0
PND: 0
ABDOMINAL PAIN: 0
MYALGIAS: 1
DIARRHEA: 0
ORTHOPNEA: 0
VOMITING: 0
WEAKNESS: 1
COUGH: 0

## 2023-01-01 ASSESSMENT — COGNITIVE AND FUNCTIONAL STATUS - GENERAL
EATING MEALS: A LITTLE
MOVING FROM LYING ON BACK TO SITTING ON SIDE OF FLAT BED: A LITTLE
SUGGESTED CMS G CODE MODIFIER DAILY ACTIVITY: CK
DAILY ACTIVITIY SCORE: 18
WALKING IN HOSPITAL ROOM: TOTAL
TURNING FROM BACK TO SIDE WHILE IN FLAT BAD: A LITTLE
DRESSING REGULAR LOWER BODY CLOTHING: A LITTLE
HELP NEEDED FOR BATHING: A LITTLE
MOBILITY SCORE: 14
MOVING TO AND FROM BED TO CHAIR: A LITTLE
PERSONAL GROOMING: A LITTLE
DRESSING REGULAR UPPER BODY CLOTHING: A LITTLE
SUGGESTED CMS G CODE MODIFIER MOBILITY: CL
STANDING UP FROM CHAIR USING ARMS: A LITTLE
TOILETING: A LITTLE
CLIMB 3 TO 5 STEPS WITH RAILING: TOTAL

## 2023-01-01 ASSESSMENT — PAIN DESCRIPTION - PAIN TYPE
TYPE: ACUTE PAIN

## 2023-01-01 ASSESSMENT — FIBROSIS 4 INDEX
FIB4 SCORE: 1.22
FIB4 SCORE: 1.14
FIB4 SCORE: 1.84
FIB4 SCORE: 0.93
FIB4 SCORE: 0.84
FIB4 SCORE: 2.09
FIB4 SCORE: 0.75
FIB4 SCORE: 0.84
FIB4 SCORE: 0.54
FIB4 SCORE: 1.33
FIB4 SCORE: 0.41

## 2023-01-01 ASSESSMENT — LIFESTYLE VARIABLES
CONSUMPTION TOTAL: INCOMPLETE
ALCOHOL_USE: NO
DOES PATIENT WANT TO STOP DRINKING: NO
HAVE PEOPLE ANNOYED YOU BY CRITICIZING YOUR DRINKING: NO
TOTAL SCORE: 0
HOW MANY TIMES IN THE PAST YEAR HAVE YOU HAD 5 OR MORE DRINKS IN A DAY: 0
EVER HAD A DRINK FIRST THING IN THE MORNING TO STEADY YOUR NERVES TO GET RID OF A HANGOVER: NO
EVER FELT BAD OR GUILTY ABOUT YOUR DRINKING: NO
ON A TYPICAL DAY WHEN YOU DRINK ALCOHOL HOW MANY DRINKS DO YOU HAVE: 0
TOTAL SCORE: 0
AVERAGE NUMBER OF DAYS PER WEEK YOU HAVE A DRINK CONTAINING ALCOHOL: 0
EVER FELT BAD OR GUILTY ABOUT YOUR DRINKING: NO
TOTAL SCORE: 0
HAVE PEOPLE ANNOYED YOU BY CRITICIZING YOUR DRINKING: NO
HAVE YOU EVER FELT YOU SHOULD CUT DOWN ON YOUR DRINKING: NO
EVER HAD A DRINK FIRST THING IN THE MORNING TO STEADY YOUR NERVES TO GET RID OF A HANGOVER: NO
CONSUMPTION TOTAL: NEGATIVE
HAVE YOU EVER FELT YOU SHOULD CUT DOWN ON YOUR DRINKING: NO
ALCOHOL_USE: NO
TOTAL SCORE: 0
TOTAL SCORE: 0
EVER_SMOKED: YES
DOES PATIENT WANT TO STOP DRINKING: NO
TOTAL SCORE: 0

## 2023-01-01 ASSESSMENT — PULMONARY FUNCTION TESTS
EPAP_CMH2O: 10
EPAP_CMH2O: 12
EPAP_CMH2O: 10
EPAP_CMH2O: 12
EPAP_CMH2O: 10
EPAP_CMH2O: 12
EPAP_CMH2O: 19
EPAP_CMH2O: 12
EPAP_CMH2O: 12
EPAP_CMH2O: 10
EPAP_CMH2O: 12
EPAP_CMH2O: 10
EPAP_CMH2O: 12

## 2023-01-01 ASSESSMENT — PATIENT HEALTH QUESTIONNAIRE - PHQ9
SUM OF ALL RESPONSES TO PHQ9 QUESTIONS 1 AND 2: 0
1. LITTLE INTEREST OR PLEASURE IN DOING THINGS: NOT AT ALL
2. FEELING DOWN, DEPRESSED, IRRITABLE, OR HOPELESS: NOT AT ALL
2. FEELING DOWN, DEPRESSED, IRRITABLE, OR HOPELESS: NOT AT ALL
1. LITTLE INTEREST OR PLEASURE IN DOING THINGS: NOT AT ALL
SUM OF ALL RESPONSES TO PHQ9 QUESTIONS 1 AND 2: 0

## 2023-03-21 PROBLEM — I50.30 (HFPEF) HEART FAILURE WITH PRESERVED EJECTION FRACTION (HCC): Status: ACTIVE | Noted: 2023-01-01

## 2023-03-21 PROBLEM — J96.01 ACUTE RESPIRATORY FAILURE WITH HYPOXEMIA (HCC): Status: ACTIVE | Noted: 2023-01-01

## 2023-03-21 PROBLEM — E78.5 HYPERLIPIDEMIA: Status: ACTIVE | Noted: 2023-01-01

## 2023-03-21 PROBLEM — F39 MOOD DISORDER (HCC): Status: ACTIVE | Noted: 2023-01-01

## 2023-03-21 PROBLEM — J84.9 ILD (INTERSTITIAL LUNG DISEASE) (HCC): Status: ACTIVE | Noted: 2023-01-01

## 2023-03-21 NOTE — PROGRESS NOTES
[facility] INTENSIVIST DIRECT ADMISSION REPORT    Transferring facility: [facility]  Transferring physician: Dr Hmea Simons  Chief complaint: Dyspnea, hypoxemia  Pertinent history & patient course: This is a 57-year-old gentleman with a history of subarachnoid hemorrhage due to a ruptured anterior communicating artery aneurysm in 2013.  He underwent subsequent coiling in 2013.  Additionally, he has a history of primary hypertension, obesity and alcohol abuse.  He has been experiencing dyspnea for several months.  He had a normal Nataliya scan on or about 8/12/2022.  He was admitted to hospital in [place] on or about 3/7/2023 with hypoxemia and dyspnea.  Oxygen saturations on room air at that time were 56%.  He was presumed to have COPD and treated as such.  He improved and was dismissed on 4 L of domiciliary oxygen, prednisone and bronchodilators.  He was readmitted to hospital in [place] on or about 3/19/2022 with worsening dyspnea and worsening bilateral opacities.  There was concern for hospital-acquired pneumonia and he has been placed on cefepime and vancomycin.  He has been on a high flow nasal cannula with intermittent use of BiPAP.  A CTA of the pulmonary arteries is negative for pulmonary embolism, but there are severe bilateral pulmonary opacities.  He has been empirically started on methylprednisolone, 60 mg IV every 6 hours.  His Westergren sed rate is 65.  His CRP is 261.  His white blood cell count is 23,000 and there is no eosinophilia.  He has been afebrile.  He requires transfer to [facility] for subspecialty care.  Pertinent imaging & lab results: As above  Code Status: Full per transferring provider, I personally verified with the transferring provider patient's code status and the transferring provider has confirmed this with the patient.  Further work up or recommendations prior to transfer: None  Consultants called prior to transfer and pertinent input from consultants: Just me - pulmonologist and  intensivist  Patient accepted for transfer: Yes  Consultants to be called upon arrival: To be determined  Floor requested: ICU  ADT order placed for accepted patient: Yes    Please inform the Intensivist upon assignment of an ICU bed and then again on arrival of the patient.    Josse Hawk MD  Pulmonary and Critical Care Medicine

## 2023-03-22 PROBLEM — G47.30 SLEEP APNEA: Status: ACTIVE | Noted: 2023-01-01

## 2023-03-22 PROBLEM — E66.9 OBESITY: Status: ACTIVE | Noted: 2023-01-01

## 2023-03-22 PROBLEM — J44.9 COPD (CHRONIC OBSTRUCTIVE PULMONARY DISEASE) (HCC): Status: ACTIVE | Noted: 2023-01-01

## 2023-03-22 PROBLEM — I10 HTN (HYPERTENSION): Status: ACTIVE | Noted: 2023-01-01

## 2023-03-22 NOTE — PROGRESS NOTES
4 Eyes Skin Assessment Completed by Donna RN and KIMBER Rainey.    Head WDL  Ears WDL  Nose WDL  Mouth WDL  Neck WDL  Breast/Chest WDL  Shoulder Blades WDL  Spine WDL  (R) Arm/Elbow/Hand WDL  (L) Arm/Elbow/Hand WDL  Abdomen Bruising  Groin WDL  Scrotum/Coccyx/Buttocks Redness, Blanching, Discoloration, and Scab  (R) Leg WDL  (L) Leg WDL  (R) Heel/Foot/Toe WDL  (L) Heel/Foot/Toe WDL          Devices In Places ECG, Blood Pressure Cuff, Pulse Ox, and Bipap      Interventions In Place Sacral Mepilex, Pillows, and Low Air Loss Mattress    Possible Skin Injury Yes    Pictures Uploaded Into Epic Yes  Wound Consult Placed Yes  RN Wound Prevention Protocol Ordered Yes

## 2023-03-22 NOTE — HEART FAILURE PROGRAM
Possible new heart failure diagnosis. Echo is pending.    Please see below for measures to be addressed should it be decided that echocardiogram supports a heart failure diagnosis as cause of symptoms.    PLEASE NOTE: because I don't know the current ejection fraction, I cannot advise which type of heart failure measures are needed, as such, I've listed HFrEF measures below because they are the most inclusive. If patient's EF happens to be preserved, and it's still felt that there is a HF diagnosis, please know that not all of the below measures will apply.    Evidence Based Beta Blocker (bisoprolol, carvedilol, or toprol xl), for EF of 40% or less  AARON - I, for EF of 40% or less ARNI is preferred If not cost prohibitive for patient  SGLT2 inhibitor with proven ASCVD, HF, or DKD benefit Jardiance/empagliflozin): If not cost prohibitive for patient  Aldosterone antagonist, for EF of 40% or less  Anticoagulation for atrial arrhythmia, if applicable  Glycemic control for DM + HF, if applicable  Lipid lowering medication for DM + HF, if applicable  Hydralazine Hydrochloride/Isosorbide Dinitrate. The combination of hydralazine and isosorbide- dinitrate is recommended to reduce morbidity and mortality for patients self-described  Americans with NYHA class III-IV HFrEF (EF 40% or less), receiving optimal therapy with ACE inhibitors and beta blockers, unless contraindicated (Class I, MACKENZIE: A).  Pneumococcal vaccine, if not previously received  Influenza vaccine for current season, if not previously received  Nicotine cessation counseling documented, if applicable  Device screening, if applicable  Referral to disease management program specializing in heart failure care- HOSPITAL SCHEDULERS CAN BE REACHED AT [address] MONDAY THROUGH FRIDAY [phone] EXCEPT FOR MAJOR HOLIDAYS  7 calendar day f/u appointment scheduled prior to discharge, appearing on signed after visit summary.  Referral to Cardiac Rehab for NYHA Class II-IV  and EF of 35% or less

## 2023-03-22 NOTE — CARE PLAN
The patient is Watcher - Medium risk of patient condition declining or worsening    Shift Goals  Clinical Goals: Monitor O2 and respiratory demands, Diurese  Patient Goals: Decrease O2 demands, Monitor Airway  Family Goals: ANTIONE    Progress made toward(s) clinical / shift goals:      Problem: Knowledge Deficit - Standard  Goal: Patient and family/care givers will demonstrate understanding of plan of care, disease process/condition, diagnostic tests and medications  Outcome: Progressing     Problem: Skin Integrity  Goal: Skin integrity is maintained or improved  Outcome: Progressing     Problem: Pain - Standard  Goal: Alleviation of pain or a reduction in pain to the patient’s comfort goal  Outcome: Progressing

## 2023-03-22 NOTE — PROGRESS NOTES
12 hour chart check complete.     Monitoring Summary:  Rhythm: NSR w/ RBBB 60-90s bpm  Ectopy: PVC(r)

## 2023-03-22 NOTE — DISCHARGE PLANNING
Care Transition Team Assessment    Information Source  Orientation Level: Oriented X4  Information Given By: Patient  Informant's Name: Josse Valle  Who is responsible for making decisions for patient? : Patient    Readmission Evaluation  Is this a readmission?: No    Elopement Risk  Legal Hold: No  Ambulatory or Self Mobile in Wheelchair: No-Not an Elopement Risk  Elopement Risk: Not at Risk for Elopement    Interdisciplinary Discharge Planning  Does Admitting Nurse Feel This Could be a Complex Discharge?: No  Primary Care Physician: Jose Heaton ([address] [place] [address]: [phone])  Lives with - Patient's Self Care Capacity: Spouse (Mother Neno Carrillo)  Patient or legal guardian wants to designate a caregiver: No  Support Systems: Parent, Spouse / Significant Other  Housing / Facility: 1 Story House (3 stairs)  Do You Take your Prescribed Medications Regularly: Yes (Pharmacy: [facility],[address] [place] [phone])  Able to Return to Previous ADL's: Yes  Mobility Issues: Yes (weakness due to SOB)  Prior Services: None  Patient Prefers to be Discharged to:: Home  Assistance Needed: No  Durable Medical Equipment: Other - Specify (canes, pulse ox, getting a shower chair or bench)    Discharge Preparedness  What is your plan after discharge?: Home with help  What are your discharge supports?: Parent, Spouse  Prior Functional Level: Independent with Activities of Daily Living, Needs Assist with Activities of Daily Living (felt weak/SOB when taking a shower and needed assistance)  Difficulty with ADL's: Bathing  Difficulty with ADL's Comment: see above  Difficulty with IADLs: Cooking  Difficulty with IADLs Comments: he usually is responsible for cooking but has not been able to stand to do this  Patient drives self to appointments.    Functional Assessment  Prior Functional Level: Independent with Activities of Daily Living, Needs Assist with Activities of Daily Living  "(felt weak/SOB when taking a shower and needed assistance)    Finances  Financial Barriers to Discharge: Yes  Source of Income: Social Security Disability  Prescription Coverage: Yes    Vision / Hearing Impairment  Vision Impairment : No  Hearing Impairment : No    Values / Beliefs / Concerns  Values / Beliefs Concerns : No    Advance Directive  Advance Directive?: DPOA for Health Care  Durable Power of  Name and Contact : Paula Valle ([phone])  Patient states he does not believe this is on file at [facility] but is at [facility]. Advised to make a copy and put in wallet for future.    Domestic Abuse  Have you ever been the victim of abuse or violence?: No  Physical Abuse or Sexual Abuse: No  Verbal Abuse or Emotional Abuse: No  Possible Abuse/Neglect Reported to:: Not Applicable    Psychological Assessment  History of Substance Abuse: Alcohol (states he quit 10 years ago)  Date Last Used - Alcohol: quit 10 years ago  Substance Abuse Comments: no illegal substances  History of Psychiatric Problems: Yes (anxiety)  Non-compliant with Treatment: No (Was taking medications until his MD \"got into trouble\"; he has not been on treatment since then (5 years ago)    Discharge Risks or Barriers  Discharge risks or barriers?: No, Other (comment) (weakness/SOB)  Patient risk factors: Mental health (untreated anxiety)    Anticipated Discharge Information  Discharge Disposition: Discharged to home/self care (01)  Discharge Address: [address] [place] [address] [place] [address]  Discharge Contact Phone Number: [phone] (spouse Paula Valle)    RN CM met with patient at bedside, introduced self and role.    Patient lives in ranch style home with his spouse and mother. He is at baseline independent but has had weakness due to shortness of breath lately and has needed more assistance at home.    Patient notes that his wife is also disabled and they are on a limited income and he is concerned about hospital/medical bills. RN CM " will refer to financial assistance ([email or web address]) and [employer].    Patient notes hx of untreated anxiety.    Patient's goal is to dc to home with family.    Patient's wife or his brother Darwin Valle will provide dc transportation.    Patient will need an IMM prior to discharge.      Faith LIMON RN Case Manager  [phone]

## 2023-03-22 NOTE — ASSESSMENT & PLAN NOTE
*Due to severe ARDS due to concern of suspected ILD, unclear etiology on admission  *ANCA, MPO & PR3 negative, THELMA negative, CTD panel negative,  HP panel negative, Coccidiodes antibodies negative, resp viral panel negative, legionella and strep pneumo negative, pneumocystitis negative, IGE serum WNL, fungal culture negative, PCP DFA negative,  Aspergillus negative, Sputum DFA negative, Culture with PSA + growth, BAL 3/26 - white, clear, polys 85%, lymph 4%  *CT chest w/o (3/27/23):  w/ diffuse hazy parenchymal opacities, vs pneumonitis, probable background pulmonary fibrosis  *Not a candidate to transfer for ECMO w/ Corley Lung Score: 3.3 due to BMI 40.59  *Possibly fibrotic syndrome such as diffuse alveolar damage likely 2/2 ARDS from pneumonia.    -Extended myositis panel under misc on 3/26 - [facility] [id number] pending   -Blood cultures pending, restarted empiric abx  -GOC w/ mother, possible comfort care measures today  -ARDS management as stated above  -Will hold off for flolan at this time and reassess   -Continue with proning 16 hr + supine 8 hr  -Tube feeding (3/27-)  -Stop paralytics, keep RAAS -5  -Completed 7 day course of Cefepime + Doxycycline w/ stop date 3/31  -Continue w/ methylprednisolone 40 mg IV BID  -Continue w/ dialysis for euvolemia and electrolyte correction  -Poor prognosis, palliative discussion

## 2023-03-22 NOTE — CARE PLAN
Problem: Ventilation  Goal: Ability to achieve and maintain unassisted ventilation or tolerate decreased levels of ventilator support  Description: Target End Date:  4 days     Document on Vent flowsheet    1.  Support and monitor invasive and noninvasive mechanical ventilation  2.  Monitor ventilator weaning response  3.  Perform ventilator associated pneumonia prevention interventions  4.  Manage ventilation therapy by monitoring diagnostic test results  Outcome: Progressing  BiPAP 16/10 100%     Problem: Bronchoconstriction  Goal: Improve in air movement and diminished wheezing  Description: Target End Date:  2 to 3 days    1.  Implement inhaled treatments  2.  Evaluate and manage medication effects  Outcome: Progressing    Respiratory Update    Treatment modality: Duoneb  Frequency: Q4    Pt tolerating current treatments well with no adverse reactions.       Problem: Humidified High Flow Nasal Cannula  Goal: Maintain adequate oxygenation dependent on patient condition  Description: Target End Date:  resolve prior to discharge or when underlying condition is resolved/stabilized    1.  Implement humidified high flow oxygen therapy  2.  Titrate high flow oxygen to maintain appropriate SpO2  Outcome: Progressing    Respiratory Update  60L 100%

## 2023-03-22 NOTE — ASSESSMENT & PLAN NOTE
This is a suspected diagnosis most recent CT  He has denied any change in his weakness when climbing stairs suggested could also be idiopathic  Work-up has been stopped, and, and CRP as well as ESR and hypersensitivity  Further work-up with rheumatoid arthritis panel, connective tissue panel, and, complement, aldolase, creatinine kinase.  Wwork-up for infection with PCP, respiratory panel, mycoplasma, aspergillosis, HIV, Legionella, urine strep and crypto as well as coccido  We will continue steroid for now   Currently intubated and proned  Consulted pulm

## 2023-03-22 NOTE — ASSESSMENT & PLAN NOTE
*Acute on chronic, BNP 2085, some pleural edema noted on CXR   *Echo (3/22/23): EF 55%, RSVP 65-70 mmHg, moderately dilated right ventricle    -Daily weight  -Daily I's and O's  -Hold home metoprolol and benazepril 2/2 critical illness

## 2023-03-22 NOTE — DIETARY
NUTRITION SERVICES: BMI - Pt with BMI >40 (=Body mass index is 40.51 kg/m².), Class III obesity. Weight loss counseling not appropriate in acute care setting. RECOMMEND - If appropriate at DC please refer to outpatient nutrition services for weight management.

## 2023-03-22 NOTE — PROGRESS NOTES
[facility] ICU Progress Note      Admit Date: 3/21/2023    Resident(s): Rody Ware M.D.   Attending:  Dr. Daniels      [facility] Course (carried forward and updated):  Hospital Day: 1    Josse Valle is a 57 y.o. male with PMHx significant for COPD, hypertension, heart failure with preserved ejection fraction cerebral aneurysm  He was transferred from [facility] for acute on chronic hypoxic respiratory failure with suspected sepsis.  Per history, about 10 days ago, he was seen at the local hospital with shortness of breath.  At that time, he was treated for what was suspected to be COPD exacerbation as well as CHF exacerbation and pneumonia.  He was discharged home with short course of steroid as well as 6 L of oxygen with no prior oxygen use.  Few days after his steroid way down, he started to experience worsening shortness of breath associated with mild cough without any PND/orthopnea/lower extremity swelling.  There was no chills or temperature associated with that either, no recent traveling, and he denied having been exposed to anyone sick.  He went back to the local hospital on 3/19/2023, and his WBC was elevated with a CT PE that was negative but was showing bilateral groundglass infiltrate.  He was started on antibiotics as well as IV steroid with work-up showing elevated CRP and ESR as well as nuclear and he was transferred here to the ICU for more intensive care.    Interval:  03/22: Patient states that he feels slightly better, but at the bedside, he appears hypoxia when talking. Suspected ILD and work up being done. MRSA negative and continue on unasyn and discontinue cefepime and linezolid. Increasing furosemide and steroids. Remains unstable and to be watched    Review of Systems   Constitutional:  Negative for chills and fever.   HENT:  Negative for sore throat.    Respiratory:  Negative for cough and wheezing.    Cardiovascular:  Negative for chest pain, palpitations,  orthopnea, claudication, leg swelling and PND.   Gastrointestinal:  Negative for abdominal pain, constipation, diarrhea, heartburn, nausea and vomiting.   Genitourinary:  Negative for dysuria.   Musculoskeletal:  Positive for myalgias.   Neurological:  Positive for weakness. Negative for headaches.   Psychiatric/Behavioral:  Negative for depression.       Vitals:  Temp:  [35.9 °C (96.6 °F)-37 °C (98.6 °F)] 35.9 °C (96.6 °F)  Pulse:  [66-93] 68  Resp:  [36-57] 48  BP: (110-146)/(66-81) 125/70  SpO2:  [73 %-94 %] 73 %  O2 therapy: Pulse Oximetry: (!) 73 %, O2 (LPM): 60, O2 Delivery Device: Heated High Flow Nasal Cannula          I/O's:    Intake/Output Summary (Last 24 hours) at 3/22/2023 0802  Last data filed at 3/22/2023 0700  Gross per 24 hour   Intake 658.83 ml   Output 1650 ml   Net -991.17 ml       Physical Exam:  Physical Exam  Constitutional:       Appearance: He is obese. He is ill-appearing.   HENT:      Head: Normocephalic.      Nose: Nose normal.      Mouth/Throat:      Mouth: Mucous membranes are moist.   Eyes:      Extraocular Movements: Extraocular movements intact.      Pupils: Pupils are equal, round, and reactive to light.   Cardiovascular:      Rate and Rhythm: Normal rate and regular rhythm.      Pulses: Normal pulses.   Pulmonary:      Effort: Respiratory distress present.      Breath sounds: Normal breath sounds.   Abdominal:      Comments: Abdomen is obese, non distended, non tender, and without guarding and without rebound   Musculoskeletal:      Right lower leg: No edema.      Left lower leg: No edema.   Skin:     Capillary Refill: Capillary refill takes 2 to 3 seconds.   Neurological:      Mental Status: He is alert and oriented to person, place, and time.   Psychiatric:         Mood and Affect: Mood normal.       Labs:      Recent Labs     03/21/23  2215 03/22/23  0530   SODIUM 142 141   POTASSIUM 4.6 4.2   CHLORIDE 106 103   CO2 25 26   BUN 40* 42*   CREATININE 0.93 0.87   MAGNESIUM 2.4  --     CALCIUM 9.6 9.5     Recent Labs     03/21/23 2215 03/22/23  0530   ALTSGPT 56*  --    ASTSGOT 32  --    ALKPHOSPHAT 97  --    TBILIRUBIN 0.3  --    GLUCOSE 157* 189*     Recent Labs     03/21/23 2215 03/22/23  0530   RBC 5.10 5.08   HEMOGLOBIN 13.9* 13.9*   HEMATOCRIT 42.9 43.8   PLATELETCT 299 307     Recent Labs     03/21/23 2215 03/22/23  0530   WBC 20.6* 18.4*   NEUTSPOLYS 90.80* 90.10*   LYMPHOCYTES 3.60* 4.30*   MONOCYTES 4.90 4.70   EOSINOPHILS 0.00 0.00   BASOPHILS 0.10 0.10   ASTSGOT 32  --    ALTSGPT 56*  --    ALKPHOSPHAT 97  --    TBILIRUBIN 0.3  --        Imaging:  DX-CHEST-PORTABLE (1 VIEW)   Final Result      1.  Enlarged cardiac silhouette with diffuse bilateral interstitial and airspace opacities. This could be due to edema or multifocal pneumonia.      OUTSIDE IMAGES-DX CHEST   Final Result      US-FOREIGN FILM ULTRASOUND   Final Result      OUTSIDE IMAGES-CT CHEST   Final Result      EC-ECHOCARDIOGRAM COMPLETE W/O CONT    (Results Pending)       Microbiology:  I have reviewed the relevant microbiology.    Problem List:  Principal Problem:    Acute respiratory failure with hypoxemia (HCC) POA: Yes  Active Problems:    Hyperlipidemia POA: Unknown    ILD (interstitial lung disease) (HCC) POA: Unknown    (HFpEF) heart failure with preserved ejection fraction (HCC) POA: Unknown    Mood disorder (HCC) POA: Unknown  Resolved Problems:    * No resolved hospital problems. *      ASSESSEMENT and PLAN:  * Acute respiratory failure with hypoxemia (HCC)- (present on admission)  Assessment & Plan  The source is unclear but there is a high level of suspicion for HF exacerbation versus pneumonia versus ILD  ILD in particular is from the latest CT PE groundglass opacity  Currently on Unasyn (after he was started on cefepime and linezolid with MRSA was negative)  40 mg twice daily of furosemide  He was reloaded with methylprednisolone 500 mg once a day and currently on 62.5 mg every 6 hours  Strict  I&O's  Cardiac diet  Daily chest x-ray  Intermittent BiPAP and also high flow.  Risk of intubation    ILD (interstitial lung disease) ([facility])  Assessment & Plan  This is a suspected diagnosis most recent CT  He has denied any change in his weakness when climbing stairs suggested could also be idiopathic  Work-up has been stopped, and, and CRP as well as ESR and hypersensitivity  Further work-up with rheumatoid arthritis panel, connective tissue panel, and, complement, aldolase, creatinine kinase.  Full the work-up for infection with PCP, respiratory panel, mycoplasma, aspergillosis, HIV, Legionella, urine strep and crypto as well as coccido  We will continue steroid for now  We will consult in due time    Suspected sleep apnea  Assessment & Plan  STOP BANG of 7  Will need sleep study after discharge    HTN (hypertension)  Assessment & Plan  On amlodipine    (HFpEF) heart failure with preserved ejection fraction ([facility])  Assessment & Plan  Acute on chronic, BNP 2085, some pleural edema noted on CXR   New echo has been ordered  Daily weight  Daily I's and O's  Currently on diuretics  Also on metoprolol and benazepril    Obesity  Assessment & Plan  More discussion to be done about weight loss and diet control    COPD (chronic obstructive pulmonary disease) ([facility])  Assessment & Plan  On home meds    Mood disorder ([facility])  Assessment & Plan  Cont home sertraline    Hyperlipidemia  Assessment & Plan  Cont home statin        # ICU Checklist  -DVT ppx: Enoxaparin  -GI ppx: N/A  -Nutrition: Cardiac diet      Meds:   insulin regular  1-6 Units      And    dextrose bolus  25 g      benazepril  40 mg      senna-docusate  2 Tablet      And    polyethylene glycol/lytes  1 Packet      And    magnesium hydroxide  30 mL      And    bisacodyl  10 mg      Respiratory Therapy Consult        heparin  5,000 Units      ipratropium-albuterol  3 mL      rosuvastatin  20 mg      sertraline  100 mg      amLODIPine  10 mg      fluticasone  2 Puff       umeclidinium-vilanterol  1 Puff      cefepime  2 g Stopped (03/22/23 0542)    linezolid  600 mg      methylPREDNISolone  40 mg      furosemide  20 mg      metoprolol SR  25 mg      ipratropium-albuterol  3 mL          Current Outpatient Medications   Medication Instructions    amLODIPine (NORVASC) 5 mg, Oral, DAILY    benazepril (LOTENSIN) 40 mg, Oral, DAILY    fluticasone-umeclidin-vilant (TRELEGY ELLIPTA) 100-62.5-25 MCG/ACT AEROSOL POWDER, BREATH ACTIVATED inhalation 1 Inhalation., Inhalation, DAILY    furosemide (LASIX) 20 mg, Oral, DAILY    ipratropium-albuterol (DUONEB) 0.5-2.5 (3) MG/3ML nebulizer solution 3 mL, Nebulization, 4 TIMES DAILY    ipratropium-albuterol (DUONEB) 0.5-2.5 (3) MG/3ML nebulizer solution 3 mL, Nebulization, EVERY 2 HOURS PRN    metoprolol SR (TOPROL XL) 25 mg, Oral, DAILY    predniSONE (DELTASONE) 20 mg, Oral, DAILY    rosuvastatin (CRESTOR) 20 mg, Oral, EVERY EVENING    sertraline (ZOLOFT) 100 mg, Oral, DAILY           [unfilled]

## 2023-03-22 NOTE — PROGRESS NOTES
Patient arrived via EMS to [id number]. Patient A/Ox4, oxygen saturation of 94% on BiPAP. Patient transferred self from EMS [facility] to hospital bed. Oriented to room, call light, fall risk and BiPAP. Patient agreeable, assessment, 4 eyes skin check completed. Telemetry monitoring attached to patient and rhythm verified. [facility] completed.

## 2023-03-22 NOTE — CONSULTS
Critical Care/Pulmonary Consultation    Date of Service: 3/21/2023    Date of Admission:  3/21/2023  8:55 PM    Consulting Physician: John Simons MD ([facility])    Chief Complaint:  Transfer from OSH for higher level care    History of Present Illness:   Josse Valle is a 57 y.o. male with a past medical history of COPD (recently requiring 6L O2), ?ILD, HTN, HFpEF, cerebral aneurysm (repaired in Feb 2013) who presented to our facility as a transfer from [facility] for further assessment and management of AHRF in the setting of sepsis suspected likely secondary to lobar PNA.     Patient initially presented to OSHw/ severe hypoxemia and SOB. Of note, patient was actually discharged from the same facility 10 days prior to this presentation after similar presentation and was treated for hypoxemia in the setting of COPD and CHF exacerbation. He was d/c at that time on 6L O2 (previous baseline was RA), and unfortunately while at home he continued to have worsening SOB (after having completed 5 day course of abx and steroids) and presented again for evaluation/management.     During this recent hospitalization patient was noted to have severe hypoxemia requiring HiFLO NC w/ intermittent BiPAP. His WBC on presentation to OSH was 26 and procal of 0.5 w/ CXR showed infiltrates w/ concern for possible PNA so patient was initiated on vanc and cefepime for possible hospital acquired PNA. He underwent CTA today prior to transfer which was negative for PE, however did reveal diffuse b/l ground glass infiltrates and reticular interstitial infiltrates w/ no alveolar infiltrates noted. He was therefore also initiated on methylpred 60 mg IV Q6. There was also noted reflux of contrast into the hepatic veins, compatible w/ R heart failure.      On presentation to our facility, patient remains on BiPAP, and reports some mild SOB and cough however denies any CP, f/c, night sweats,  abdominal pain, n/v.    Review of Systems   Constitutional:  Negative for chills and fever.   Eyes:  Negative for blurred vision and double vision.   Respiratory:  Positive for cough and sputum production. Negative for shortness of breath and wheezing.    Cardiovascular:  Negative for chest pain and palpitations.   Gastrointestinal:  Negative for heartburn, nausea and vomiting.   Genitourinary:  Negative for dysuria and urgency.   Skin:  Negative for itching and rash.   Neurological:  Negative for dizziness and headaches.     Home Medications    **Home medications have not yet been reviewed for this encounter**         Social History     Tobacco Use    Smoking status: Every Day     Packs/day: 0.50     Years: 32.00     Pack years: 16.00     Types: Cigarettes    Smokeless tobacco: Never   Substance Use Topics    Alcohol use: No     Comment: quit 2012    Drug use: No        Past Medical History:   Diagnosis Date    Alcohol withdrawal delirium (HCC) 2/25/2013    Arthritis     Aspiration pneumonia (HCC) 3/5/2013    Backpain     Depression     Hypertension     Indigestion     Stroke (HCC)        Past Surgical History:   Procedure Laterality Date    OTHER NEUROLOGICAL SURG         Allergies: Patient has no known allergies.    Family History   Problem Relation Age of Onset    Lung Disease Father     Diabetes Father     Hypertension Father        Vitals:    03/21/23 2045 03/21/23 2100   Height: 1.829 m (6')    Weight: (!) 135 kg (298 lb 11.6 oz)    Weight % change since last entry.: 0 %    Pulse:  93   BMI (Calculated): 40.51    Resp:  (!) 57   Temp: 36.9 °C (98.5 °F)    TempSrc: Temporal        Physical Examination  Physical Exam  Constitutional:       Appearance: Normal appearance.   HENT:      Head: Normocephalic and atraumatic.      Mouth/Throat:      Mouth: Mucous membranes are moist.      Pharynx: Oropharynx is clear.   Eyes:      Pupils: Pupils are equal, round, and reactive to light.   Cardiovascular:      Rate and  Rhythm: Normal rate and regular rhythm.      Pulses: Normal pulses.      Heart sounds: Normal heart sounds.   Pulmonary:      Effort: No respiratory distress.      Breath sounds: Normal breath sounds.      Comments: On BiPAP  Abdominal:      Tenderness: There is no abdominal tenderness.      Comments: obese   Skin:     General: Skin is warm and dry.      Capillary Refill: Capillary refill takes less than 2 seconds.   Neurological:      General: No focal deficit present.      Mental Status: He is alert and oriented to person, place, and time.   Psychiatric:         Mood and Affect: Mood normal.         Behavior: Behavior normal.         No intake or output data in the 24 hours ending 03/21/23 2101        No results for input(s): SODIUM, POTASSIUM, CHLORIDE, CO2, BUN, CREATININE, MAGNESIUM, PHOSPHORUS, CALCIUM in the last 72 hours.  No results for input(s): ALTSGPT, ASTSGOT, ALKPHOSPHAT, TBILIRUBIN, DBILIRUBIN, GAMMAGT, AMYLASE, LIPASE, ALB, PREALBUMIN, GLUCOSE in the last 72 hours.      No orders to display       Patient Active Problem List   Diagnosis    ICH (intracerebral hemorrhage) (HCC)    Hyponatremia    Subarachnoid hemorrhage from anterior communicating artery (HCC)    Tobacco abuse    History of alcohol abuse    Elevated liver enzymes    Acute respiratory failure with hypoxemia (HCC)     Imaging from OSH:    Chest x-ray, single view, 3-  Impression: There is progression of disease.  There is progression of diffuse bilateral periphery hilar pulmonary infiltrates worrisome for CHF and pulmonary edema and/or pneumonia.  There is increased density in the left mid to lower lung.  There is underlying chronic interstitial lung disease.    Ultrasound venous duplex bilateral, 3-  Impression: No evidence of deep venous thrombosis bilateral    CT angio chest with and without contrast, 3-  Impression: Heart is enlarged in size.  There is reflux of contrast into the hepatic veins compatible with  right heart failure.  Diffuse bilateral groundglass infiltrates and reticular interstitial infiltrates.  No alveolar infiltrates noted.  There is interval development of a 1.8 x 1.5 cm right paratracheal lymphadenopathy there is interval element of prevascular lymphadenopathy measuring 1.4 x 1.3 cm.  There is concern of underlying pulmonary fibrosis.    Assessment and Plan:    * Acute respiratory failure with hypoxemia (HCC)  Assessment & Plan  Unclear source at this time, was presumed to be multifactorial at outside hospital in the setting of possible pneumonia (was being treated for HAP) versus CHF exacerbation versus worsening ILD (no formal diagnosis). Suspicion more so ILD at this time given worsening of symptoms after completion of steroids from previous admission.   - We will continue abx in the interim, Cefepime, and Linezolid rater than Vanc (can desecalate pending MRSA nares)  - RT on board, will attempt to wean off of BiPAP as appropriate  - Cont lasix (20 IV BID), adjust as needed, goal euvolemia  - Cont steroids - 40mg IV methylpred Q6  - Can consider bronchoscopy once better optimized    (HFpEF) heart failure with preserved ejection fraction (HCC)  Assessment & Plan  Acute on chronic, BNP 2085, some pleural edema noted on CXR   - Cont diuresis as above  - Monitor Is and Os  - Daily weights  - Cont GDMT  - Echo ordered and pending    ILD (interstitial lung disease) (HCC)  Assessment & Plan  No formal diagnosis, however findings suggestive noted on CT. 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  - THELMA, ANCA, CRP, Sed rate and hypersensitivity pneumonitis panels ordered  - Cont steroids as above    Mood disorder (HCC)  Assessment & Plan  Cont home sertraline    Hyperlipidemia  Assessment & Plan  Cont home statin      # ICU Checklist  -DVT ppx: Heparin  -GI ppx: NA  -Nutrition: Cardiac

## 2023-03-22 NOTE — CARE PLAN
The patient is Stable - Low risk of patient condition declining or worsening    Shift Goals  Clinical Goals: Monitor O2 and BiPAP, orient to new hospital/unit/room  Patient Goals: Decrease O2 need  Family Goals: ANTIONE    Progress made toward(s) clinical / shift goals:    Problem: Knowledge Deficit - Standard  Goal: Patient and family/care givers will demonstrate understanding of plan of care, disease process/condition, diagnostic tests and medications  Outcome: Progressing     Problem: Skin Integrity  Goal: Skin integrity is maintained or improved  Outcome: Progressing       Patient is not progressing towards the following goals:

## 2023-03-22 NOTE — CARE PLAN
Problem: Ventilation  Goal: Ability to achieve and maintain unassisted ventilation or tolerate decreased levels of ventilator support  Description: Target End Date:  4 days     Document on Vent flowsheet    1.  Support and monitor invasive and noninvasive mechanical ventilation  2.  Monitor ventilator weaning response  3.  Perform ventilator associated pneumonia prevention interventions  4.  Manage ventilation therapy by monitoring diagnostic test results  Outcome: Not Met    Pt. Is on Bipap 16/10 Fio2 100%    Pt. May use high Flow to take a break from Bipap.
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
## 2023-03-23 PROBLEM — J18.9 HOSPITAL ACQUIRED PNA: Status: ACTIVE | Noted: 2023-01-01

## 2023-03-23 NOTE — CARE PLAN
Problem: Ventilation  Goal: Ability to achieve and maintain unassisted ventilation or tolerate decreased levels of ventilator support  Description: Target End Date:  4 days     Document on Vent flowsheet    1.  Support and monitor invasive and noninvasive mechanical ventilation  2.  Monitor ventilator weaning response  3.  Perform ventilator associated pneumonia prevention interventions  4.  Manage ventilation therapy by monitoring diagnostic test results  Outcome: Progressing  BiPAP 16/10 100%     Problem: Bronchoconstriction  Goal: Improve in air movement and diminished wheezing  Description: Target End Date:  2 to 3 days    1.  Implement inhaled treatments  2.  Evaluate and manage medication effects  Outcome: Progressing    Respiratory Update    Treatment modality: Duoneb  Frequency: Q4    Pt tolerating current treatments well with no adverse reactions.       Problem: Humidified High Flow Nasal Cannula  Goal: Maintain adequate oxygenation dependent on patient condition  Description: Target End Date:  resolve prior to discharge or when underlying condition is resolved/stabilized    1.  Implement humidified high flow oxygen therapy  2.  Titrate high flow oxygen to maintain appropriate SpO2  Outcome: Progressing    Respiratory Update    60L / 100%

## 2023-03-23 NOTE — PROGRESS NOTES
[facility] ICU Progress Note      Admit Date: 3/21/2023    Resident(s): Rody Ware M.D.   Attending:  Dr. Daniels      [facility] Course (carried forward and updated):  Hospital Day: 2    Josse Valle is a 57 y.o. male with PMHx significant for COPD, hypertension, heart failure with preserved ejection fraction cerebral aneurysm  He was transferred from [facility] for acute on chronic hypoxic respiratory failure with suspected sepsis.  Per history, about 10 days ago, he was seen at the local hospital with shortness of breath.  At that time, he was treated for what was suspected to be COPD exacerbation as well as CHF exacerbation and pneumonia.  He was discharged home with short course of steroid as well as 6 L of oxygen with no prior oxygen use.  Few days after his steroid way down, he started to experience worsening shortness of breath associated with mild cough without any PND/orthopnea/lower extremity swelling.  There was no chills or temperature associated with that either, no recent traveling, and he denied having been exposed to anyone sick.  He went back to the local hospital on 3/19/2023, and his WBC was elevated with a CT PE that was negative but was showing bilateral groundglass infiltrate.  He was started on antibiotics as well as IV steroid with work-up showing elevated CRP and ESR as well as nuclear and he was transferred here to the ICU for more intensive care.    Interval:  03/22: Patient states that he feels slightly better, but at the bedside, he appears hypoxia when talking. Suspected ILD and work up being done. MRSA negative and continue on unasyn and discontinue cefepime and linezolid. Increasing furosemide and steroids. Remains unstable and to be watched  03/23: Just mild improvement of CXR, but patient still has high oxygen requirement. Transitioned from bipap to highflow in the AM. We are continuing steroids for ILD. Mild creatinine increase and BMP to be recheck and  furosemide adjusted if needed. Unasyn to augmentin    Review of Systems   Constitutional:  Negative for chills and fever.   HENT:  Negative for sore throat.    Respiratory:  Negative for cough and wheezing.    Cardiovascular:  Negative for chest pain, palpitations, orthopnea, claudication, leg swelling and PND.   Gastrointestinal:  Negative for abdominal pain, constipation, diarrhea, heartburn, nausea and vomiting.   Genitourinary:  Negative for dysuria.   Musculoskeletal:  Positive for myalgias.   Neurological:  Positive for weakness. Negative for headaches.   Psychiatric/Behavioral:  Negative for depression.       Vitals:  Temp:  [36.7 °C (98 °F)-37 °C (98.6 °F)] 36.7 °C (98 °F)  Pulse:  [56-89] 74  Resp:  [22-56] 54  BP: ()/(52-72) 129/67  SpO2:  [77 %-94 %] 78 %  O2 therapy: Pulse Oximetry: (!) 78 %, O2 (LPM): 60, O2 Delivery Device: BIPAP          I/O's:    Intake/Output Summary (Last 24 hours) at 3/23/2023 0824  Last data filed at 3/23/2023 0800  Gross per 24 hour   Intake 1399.82 ml   Output 3250 ml   Net -1850.18 ml         Physical Exam:  Physical Exam  Constitutional:       Appearance: He is obese. He is ill-appearing.   HENT:      Head: Normocephalic.      Nose: Nose normal.      Mouth/Throat:      Mouth: Mucous membranes are moist.   Eyes:      Extraocular Movements: Extraocular movements intact.      Pupils: Pupils are equal, round, and reactive to light.   Cardiovascular:      Rate and Rhythm: Normal rate and regular rhythm.      Pulses: Normal pulses.   Pulmonary:      Effort: Respiratory distress present.      Breath sounds: Normal breath sounds.   Abdominal:      Comments: Abdomen is obese, non distended, non tender, and without guarding and without rebound   Musculoskeletal:      Right lower leg: No edema.      Left lower leg: No edema.   Skin:     Capillary Refill: Capillary refill takes 2 to 3 seconds.   Neurological:      Mental Status: He is alert and oriented to person, place, and time.    Psychiatric:         Mood and Affect: Mood normal.       Labs:      Recent Labs     03/21/23 2215 03/22/23  0530 03/23/23  0520   SODIUM 142 141 142   POTASSIUM 4.6 4.2 4.1   CHLORIDE 106 103 103   CO2 25 26 28   BUN 40* 42* 46*   CREATININE 0.93 0.87 1.02   MAGNESIUM 2.4  --   --    CALCIUM 9.6 9.5 9.4       Recent Labs     03/21/23 2215 03/22/23 0530 03/23/23  0520   ALTSGPT 56*  --   --    ASTSGOT 32  --   --    ALKPHOSPHAT 97  --   --    TBILIRUBIN 0.3  --   --    GLUCOSE 157* 189* 209*       Recent Labs     03/21/23 2215 03/22/23 0530 03/23/23  0520   RBC 5.10 5.08 5.13   HEMOGLOBIN 13.9* 13.9* 13.9*   HEMATOCRIT 42.9 43.8 43.9   PLATELETCT 299 307 302       Recent Labs     03/21/23 2215 03/22/23 0530 03/23/23  0520   WBC 20.6* 18.4* 15.1*   NEUTSPOLYS 90.80* 90.10* 87.50*   LYMPHOCYTES 3.60* 4.30* 5.50*   MONOCYTES 4.90 4.70 6.30   EOSINOPHILS 0.00 0.00 0.00   BASOPHILS 0.10 0.10 0.10   ASTSGOT 32  --   --    ALTSGPT 56*  --   --    ALKPHOSPHAT 97  --   --    TBILIRUBIN 0.3  --   --          Imaging:  DX-CHEST-PORTABLE (1 VIEW)   Final Result         1.  Pulmonary edema and/or infiltrates are identified, which are stable since the prior exam.   2.  Cardiomegaly      EC-ECHOCARDIOGRAM COMPLETE W/O CONT   Final Result      DX-CHEST-PORTABLE (1 VIEW)   Final Result      1.  Enlarged cardiac silhouette with diffuse bilateral interstitial and airspace opacities. This could be due to edema or multifocal pneumonia.      OUTSIDE IMAGES-DX CHEST   Final Result      US-FOREIGN FILM ULTRASOUND   Final Result      OUTSIDE IMAGES-CT CHEST   Final Result          Microbiology:  I have reviewed the relevant microbiology.    Problem List:  Principal Problem:    Acute respiratory failure with hypoxemia (HCC) POA: Yes  Active Problems:    ILD (interstitial lung disease) (HCC) POA: Unknown    (HFpEF) heart failure with preserved ejection fraction (HCC) POA: Unknown    HTN (hypertension) POA: Unknown    Suspected sleep  apnea POA: Unknown    Obesity POA: Unknown    Hyperlipidemia POA: Unknown    Mood disorder (HCC) POA: Unknown    COPD (chronic obstructive pulmonary disease) ([facility]) POA: Unknown  Resolved Problems:    * No resolved hospital problems. *      ASSESSEMENT and PLAN:  * Acute respiratory failure with hypoxemia ([facility])- (present on admission)  Assessment & Plan  The source is unclear but there is a high level of suspicion for HF exacerbation versus pneumonia versus ILD  ILD in particular is from the latest CT PE groundglass opacity  Currently on Unasyn (after he was started on cefepime and linezolid with MRSA was negative)  40 mg twice daily of furosemide  He was reloaded with methylprednisolone 500 mg once a day and currently on 62.5 mg every 6 hours  Strict I&O's  Cardiac diet  Daily chest x-ray  Intermittent BiPAP and also high flow.  Risk of intubation    ILD (interstitial lung disease) ([facility])  Assessment & Plan  This is a suspected diagnosis most recent CT  He has denied any change in his weakness when climbing stairs suggested could also be idiopathic  Work-up has been stopped, and, and CRP as well as ESR and hypersensitivity  Further work-up with rheumatoid arthritis panel, connective tissue panel, and, complement, aldolase, creatinine kinase.  Full the work-up for infection with PCP, respiratory panel, mycoplasma, aspergillosis, HIV, Legionella, urine strep and crypto as well as coccido  We will continue steroid for now  We will consult in due time    Suspected sleep apnea  Assessment & Plan  STOP BANG of 7  Will need sleep study after discharge    HTN (hypertension)  Assessment & Plan  On amlodipine    (HFpEF) heart failure with preserved ejection fraction (HCC)  Assessment & Plan  Acute on chronic, BNP 2085, some pleural edema noted on CXR   New echo has been ordered  Daily weight  Daily I's and O's  Currently on diuretics  Also on metoprolol and benazepril    Hospital acquired PNA  Assessment & Plan  This was based  on recent hospitalization at local hospital  Chest x-ray on admission was evidence of consolidation  He was started on empiric treatment at first for HAP and it was ultimately refined to Unasyn and then Augmentin  Blood culture negative so far    Obesity  Assessment & Plan  More discussion to be done about weight loss and diet control    COPD (chronic obstructive pulmonary disease) ([facility])  Assessment & Plan  On home meds    Mood disorder ([facility])  Assessment & Plan  Cont home sertraline    Hyperlipidemia  Assessment & Plan  Cont home statin        # ICU Checklist  -DVT ppx: Enoxaparin  -GI ppx: N/A  -Nutrition: Cardiac diet      Meds:   insulin regular  1-6 Units      And    dextrose bolus  25 g      benazepril  40 mg      enoxaparin (LOVENOX) injection  40 mg      ampicillin-sulbactam (UNASYN) IV  3 g Stopped (03/23/23 0657)    furosemide  40 mg      acetaminophen  650 mg      insulin GLARGINE  15 Units      methylPREDNISolone  80 mg      melatonin  5 mg      senna-docusate  2 Tablet      And    polyethylene glycol/lytes  1 Packet      And    magnesium hydroxide  30 mL      And    bisacodyl  10 mg      Respiratory Therapy Consult        ipratropium-albuterol  3 mL      rosuvastatin  20 mg      sertraline  100 mg      amLODIPine  10 mg      fluticasone  2 Puff      umeclidinium-vilanterol  1 Puff      metoprolol SR  25 mg      ipratropium-albuterol  3 mL          Current Outpatient Medications   Medication Instructions    amLODIPine (NORVASC) 5 mg, Oral, DAILY    benazepril (LOTENSIN) 40 mg, Oral, DAILY    fluticasone-umeclidin-vilant (TRELEGY ELLIPTA) 100-62.5-25 MCG/ACT AEROSOL POWDER, BREATH ACTIVATED inhalation 1 Inhalation., Inhalation, DAILY    furosemide (LASIX) 20 mg, Oral, DAILY    ipratropium-albuterol (DUONEB) 0.5-2.5 (3) MG/3ML nebulizer solution 3 mL, Nebulization, 4 TIMES DAILY    ipratropium-albuterol (DUONEB) 0.5-2.5 (3) MG/3ML nebulizer solution 3 mL, Nebulization, EVERY 2 HOURS PRN    metoprolol SR  (TOPROL XL) 25 mg, Oral, DAILY    predniSONE (DELTASONE) 20 mg, Oral, DAILY    rosuvastatin (CRESTOR) 20 mg, Oral, EVERY EVENING    sertraline (ZOLOFT) 100 mg, Oral, DAILY

## 2023-03-23 NOTE — ASSESSMENT & PLAN NOTE
This was based on recent hospitalization at local hospital, grew out pseudomonas  Chest x-ray on admission was evidence of consolidation  He was started on empiric treatment at first for HAP and it was ultimately refined to Unasyn and then Augmentin and then cefepime when sputum grew pseudomonas  Blood culture negative so far

## 2023-03-23 NOTE — CARE PLAN
The patient is Watcher - Medium risk of patient condition declining or worsening    Shift Goals  Clinical Goals: Decrease O2 demands, stable hemodynamics  Patient Goals: Wean to HFNC  Family Goals: updates, education    Progress made toward(s) clinical / shift goals:      Problem: Knowledge Deficit - Standard  Goal: Patient and family/care givers will demonstrate understanding of plan of care, disease process/condition, diagnostic tests and medications  Outcome: Progressing     Problem: Skin Integrity  Goal: Skin integrity is maintained or improved  Outcome: Progressing     Problem: Pain - Standard  Goal: Alleviation of pain or a reduction in pain to the patient’s comfort goal  Outcome: Progressing     Problem: Fall Risk  Goal: Patient will remain free from falls  Outcome: Progressing

## 2023-03-24 NOTE — CARE PLAN
The patient is Watcher - Medium risk of patient condition declining or worsening    Shift Goals  Clinical Goals: decrease O2 demands  Patient Goals: rest, wean O2, go home  Family Goals: n/a    Progress made toward(s) clinical / shift goals:    Problem: Skin Integrity  Goal: Skin integrity is maintained or improved  Outcome: Progressing  Note: Pt turns self from side to side.  Waffle mattress in place. Pt educated about the importance of frequent repositioning to prevent skin breakdown. Encouraged turning in bed and notifying staff for help. Pt verbalized understanding. Appropriate dressings in place. Extra pillows in place for comfort, between knees, and to float heels. Barrier cream applied as appropriate. Pt cleaned and linens changed frequently as appropriate.           Problem: Pain - Standard  Goal: Alleviation of pain or a reduction in pain to the patient’s comfort goal  Outcome: Progressing  Note: Mild headache, one dose of tylenol given

## 2023-03-24 NOTE — CARE PLAN
Problem: Ventilation  Goal: Ability to achieve and maintain unassisted ventilation or tolerate decreased levels of ventilator support  Description: Target End Date:  4 days     Document on Vent flowsheet    1.  Support and monitor invasive and noninvasive mechanical ventilation  2.  Monitor ventilator weaning response  3.  Perform ventilator associated pneumonia prevention interventions  4.  Manage ventilation therapy by monitoring diagnostic test results  Outcome: Met     Problem: Bronchoconstriction  Goal: Improve in air movement and diminished wheezing  Description: Target End Date:  2 to 3 days    1.  Implement inhaled treatments  2.  Evaluate and manage medication effects  Outcome: Met     Problem: Humidified High Flow Nasal Cannula  Goal: Maintain adequate oxygenation dependent on patient condition  Description: Target End Date:  resolve prior to discharge or when underlying condition is resolved/stabilized    1.  Implement humidified high flow oxygen therapy  2.  Titrate high flow oxygen to maintain appropriate SpO2  Outcome: Met    Pt remains on BiPAP 18/12/100%    Duoneb Q4

## 2023-03-25 PROBLEM — I27.20 SEVERE PULMONARY HYPERTENSION (HCC): Status: ACTIVE | Noted: 2023-01-01

## 2023-03-25 NOTE — CARE PLAN
Problem: Ventilation  Goal: Ability to achieve and maintain unassisted ventilation or tolerate decreased levels of ventilator support  Description: Target End Date:  4 days     Document on Vent flowsheet    1.  Support and monitor invasive and noninvasive mechanical ventilation  2.  Monitor ventilator weaning response  3.  Perform ventilator associated pneumonia prevention interventions  4.  Manage ventilation therapy by monitoring diagnostic test results  Outcome: Progressing     Pt remains on BiPAP 18/12/100%

## 2023-03-25 NOTE — CARE PLAN
The patient is Watcher - Medium risk of patient condition declining or worsening    Shift Goals  Clinical Goals: Stay within oxygenation saturation goal, EOB, BGL control  Patient Goals: Be able to move prior to being admitted  Family Goals: ANTIONE    Progress made toward(s) clinical / shift goals:    Problem: Skin Integrity  Goal: Skin integrity is maintained or improved  Outcome: Progressing     Problem: Pain - Standard  Goal: Alleviation of pain or a reduction in pain to the patient’s comfort goal  Outcome: Progressing  Note: Mild headache that correlates with BiPAP straps resolved with 1 dose of PRN Tylenol      Problem: Psychosocial  Goal: Patient's level of anxiety will decrease  Outcome: Progressing  Flow sheets (Taken 3/25/2023 0318)  Decrease Anxiety Level: Collaborated with patient to identify and develop coping strategies       Patient is not progressing towards the following goals:      Problem: Respiratory  Goal: Patient will achieve/maintain optimum respiratory ventilation and gas exchange  Outcome: Not Progressing  Note: Patient continues to desat with minimal movement or off of BiPAP. Patient will maintain in low 80's on heated high flow. Minimal exertion such as sitting up in bed or rolling over causes oxygen saturation to significantly drop.

## 2023-03-25 NOTE — ASSESSMENT & PLAN NOTE
*This is evident on echo done on 03/22/2023 showing RVSP of 65-70 with an EF of 55%.  Also showing moderately dilated right ventricle.  *This is likely secondary to not only sleep apnea but also  highly suspect that the ILD    -Continue furosemide and steroid  -Hold off on Flolan for now

## 2023-03-25 NOTE — PROGRESS NOTES
[facility] ICU Progress Note      Admit Date: 3/21/2023    Resident(s): Rody Ware M.D.   Attending:  Dr. Daniels      [facility] Course (carried forward and updated):  Hospital Day: 4    Josse Valle is a 57 y.o. male with PMHx significant for COPD, hypertension, heart failure with preserved ejection fraction cerebral aneurysm  He was transferred from [facility] for acute on chronic hypoxic respiratory failure with suspected sepsis.  Per history, about 10 days ago, he was seen at the local hospital with shortness of breath.  At that time, he was treated for what was suspected to be COPD exacerbation as well as CHF exacerbation and pneumonia.  He was discharged home with short course of steroid as well as 6 L of oxygen with no prior oxygen use.  Few days after his steroid way down, he started to experience worsening shortness of breath associated with mild cough without any PND/orthopnea/lower extremity swelling.  There was no chills or temperature associated with that either, no recent traveling, and he denied having been exposed to anyone sick.  He went back to the local hospital on 3/19/2023, and his WBC was elevated with a CT PE that was negative but was showing bilateral groundglass infiltrate.  He was started on antibiotics as well as IV steroid with work-up showing elevated CRP and ESR as well as nuclear and he was transferred here to the ICU for more intensive care.    Interval:  03/22: Patient states that he feels slightly better, but at the bedside, he appears hypoxia when talking. Suspected ILD and work up being done. MRSA negative and continue on unasyn and discontinue cefepime and linezolid. Increasing furosemide and steroids. Remains unstable and to be watched  03/23: Just mild improvement of CXR, but patient still has high oxygen requirement. Transitioned from bipap to highflow in the AM. We are continuing steroids for ILD. Mild creatinine increase and BMP to be recheck and  furosemide adjusted if needed. Unasyn to augmentin  03/24: continues to have demand for Bipap. When on highflow, he easily desaturates. ABG with a pH of 7.46 and pCO2 of 50 in COPD and likely sleep apnea. Consulting pulm for failure to improve in acute/subacute ILD. No significant improvement on CXR. Complement neg. Resp panel neg and awaiting for culture. Urine legionella and strep neg. Aldolase positive  03/25: Still no improvement and now ARDS with metabolic alkalosis on furosemide. Concern about no progressing on Bipap and the imminent risk of intubation. Sputum grew pseudomonas. Unasyn stopped and was started on cefepime. Will try family meeting with wife for goals of care.    Review of Systems   Constitutional:  Negative for chills and fever.   HENT:  Negative for sore throat.    Respiratory:  Negative for cough and wheezing.    Cardiovascular:  Negative for chest pain, palpitations, orthopnea, claudication, leg swelling and PND.   Gastrointestinal:  Negative for abdominal pain, constipation, diarrhea, heartburn, nausea and vomiting.   Genitourinary:  Negative for dysuria.   Musculoskeletal:  Positive for myalgias.   Neurological:  Positive for weakness. Negative for headaches.   Psychiatric/Behavioral:  Negative for depression.       Vitals:  Temp:  [35.9 °C (96.7 °F)-36.9 °C (98.4 °F)] 35.9 °C (96.7 °F)  Pulse:  [54-78] 54  Resp:  [25-61] 25  BP: ()/(50-74) 94/52  SpO2:  [85 %-97 %] 91 %  O2 therapy: Pulse Oximetry: 91 %, O2 (LPM): 60, O2 Delivery Device: BIPAP          I/O's:    Intake/Output Summary (Last 24 hours) at 3/25/2023 1201  Last data filed at 3/25/2023 0800  Gross per 24 hour   Intake 250 ml   Output 2350 ml   Net -2100 ml         Physical Exam:  Physical Exam  Constitutional:       Appearance: He is obese. He is ill-appearing.   HENT:      Head: Normocephalic.      Nose: Nose normal.      Mouth/Throat:      Mouth: Mucous membranes are moist.   Eyes:      Extraocular Movements: Extraocular  movements intact.      Pupils: Pupils are equal, round, and reactive to light.   Cardiovascular:      Rate and Rhythm: Normal rate and regular rhythm.      Pulses: Normal pulses.   Pulmonary:      Effort: Respiratory distress present.      Breath sounds: Normal breath sounds.   Abdominal:      Comments: Abdomen is obese, non distended, non tender, and without guarding and without rebound   Musculoskeletal:      Right lower leg: No edema.      Left lower leg: No edema.   Skin:     Capillary Refill: Capillary refill takes 2 to 3 seconds.   Neurological:      Mental Status: He is alert and oriented to person, place, and time.   Psychiatric:         Mood and Affect: Mood normal.       Labs:  Recent Labs     03/24/23  0803   ISTATAPH 7.466   ISTATAPCO2 50.0*   ISTATAPO2 52*   ISTATATCO2 38*   ATBVQAK2UCL 88*   ISTATARTHCO3 36.1*   ISTATARTBE 10*   ISTATTEMP 37.2 C   ISTATSPEC Arterial   ISTATAPHTC 7.463   [id number] 53*       Recent Labs     03/23/23  1223 03/24/23  0625 03/25/23  0135   SODIUM 140 140 141   POTASSIUM 3.8 3.8 4.2   CHLORIDE 99 101 100   CO2 26 29 30   BUN 47* 42* 41*   CREATININE 0.90 0.72 0.81   CALCIUM 9.6 9.5 9.4       Recent Labs     03/23/23  1223 03/24/23  0625 03/25/23  0135   GLUCOSE 319* 190* 173*       Recent Labs     03/23/23  0520 03/24/23  0625 03/25/23  0245   RBC 5.13 5.33 5.49   HEMOGLOBIN 13.9* 14.8 14.8   HEMATOCRIT 43.9 45.7 46.6   PLATELETCT 302 271 242       Recent Labs     03/23/23  0520 03/24/23  0625 03/25/23  0245   WBC 15.1* 15.7* 16.1*   NEUTSPOLYS 87.50* 85.60* 90.70*   LYMPHOCYTES 5.50* 6.60* 4.20*   MONOCYTES 6.30 6.80 4.30   EOSINOPHILS 0.00 0.10 0.10   BASOPHILS 0.10 0.10 0.10         Imaging:  DX-CHEST-LIMITED (1 VIEW)   Final Result         Ill-defined opacifications in each lung have increased compared to the prior radiograph. This could indicate worsening of pulmonary edema or inflammation. Lungs are less expanded than on prior radiograph.      DX-CHEST-PORTABLE (1  VIEW)   Final Result         1.  Pulmonary edema and/or infiltrates are identified, which are stable since the prior exam.   2.  Cardiomegaly      DX-CHEST-PORTABLE (1 VIEW)   Final Result         1.  Pulmonary edema and/or infiltrates are identified, which are stable since the prior exam.   2.  Cardiomegaly      EC-ECHOCARDIOGRAM COMPLETE W/O CONT   Final Result      DX-CHEST-PORTABLE (1 VIEW)   Final Result      1.  Enlarged cardiac silhouette with diffuse bilateral interstitial and airspace opacities. This could be due to edema or multifocal pneumonia.      OUTSIDE IMAGES-DX CHEST   Final Result      US-FOREIGN FILM ULTRASOUND   Final Result      OUTSIDE IMAGES-CT CHEST   Final Result          Microbiology:  I have reviewed the relevant microbiology.    Problem List:  Principal Problem:    Acute respiratory failure with hypoxemia (HCC) POA: Yes  Active Problems:    ILD (interstitial lung disease) (HCC) POA: Unknown    (HFpEF) heart failure with preserved ejection fraction (HCC) POA: Unknown    HTN (hypertension) POA: Unknown    Suspected sleep apnea POA: Unknown    Obesity POA: Unknown    Hospital acquired PNA POA: Unknown    Hyperlipidemia POA: Unknown    Mood disorder (HCC) POA: Unknown    COPD (chronic obstructive pulmonary disease) (HCC) POA: Unknown  Resolved Problems:    * No resolved hospital problems. *      ASSESSEMENT and PLAN:  * Acute respiratory failure with hypoxemia (HCC)- (present on admission)  Assessment & Plan  The source is unclear but there is a high level of suspicion for HF exacerbation versus pneumonia versus ILD  ILD in particular is from the latest CT PE groundglass opacity  Currently on Unasyn (after he was started on cefepime and linezolid with MRSA was negative) then to augmentin. This was changed again on 03/25 to cefepime because of sputum growing pseudomonas  40 mg twice daily of furosemide  He was reloaded with methylprednisolone 500 mg once a day and currently on 62.5 mg every 6  hours  Strict I&O's  Cardiac diet  Daily chest x-ray and showing some evidence of ards  Intermittent BiPAP and also high flow.  Not progressing as of 03/24    ILD (interstitial lung disease) (HCC)  Assessment & Plan  This is a suspected diagnosis most recent CT  He has denied any change in his weakness when climbing stairs suggested could also be idiopathic  Work-up has been stopped, and, and CRP as well as ESR and hypersensitivity  Further work-up with rheumatoid arthritis panel, connective tissue panel, and, complement, aldolase, creatinine kinase.  Wwork-up for infection with PCP, respiratory panel, mycoplasma, aspergillosis, HIV, Legionella, urine strep and crypto as well as coccido  We will continue steroid for now  Consulted pulm  As of 03/25, no improvement on steroids    Suspected sleep apnea  Assessment & Plan  STOP BANG of 7  Will need sleep study after discharge    HTN (hypertension)  Assessment & Plan  On amlodipine    (HFpEF) heart failure with preserved ejection fraction (HCC)  Assessment & Plan  Acute on chronic, BNP 2085, some pleural edema noted on CXR   New echo has been ordered  Daily weight  Daily I's and O's  Currently on diuretics  Also on metoprolol and benazepril    Severe pulmonary hypertension (HCC)  Assessment & Plan  This is evident on echo done on 03/22/2023 showing RVSP of 65-70 with an EF of 55%.  Also showing moderately dilated right ventricle.  This is likely secondary to not only sleep apnea but also the highly suspect that the ILD  Continue furosemide and steroid    Hospital acquired PNA  Assessment & Plan  This was based on recent hospitalization at local hospital  Chest x-ray on admission was evidence of consolidation  He was started on empiric treatment at first for HAP and it was ultimately refined to Unasyn and then Augmentin and then cefepime when sputum grew pseudomonas  Blood culture negative so far    Obesity  Assessment & Plan  More discussion to be done about weight loss  and diet control    COPD (chronic obstructive pulmonary disease) ([facility])  Assessment & Plan  On home meds    Mood disorder ([facility])  Assessment & Plan  Cont home sertraline    Hyperlipidemia  Assessment & Plan  Cont home statin        # ICU Checklist  -DVT ppx: Enoxaparin  -GI ppx: N/A  -Nutrition: Cardiac diet      Meds:   cefepime  2 g      insulin GLARGINE  20 Units      MD Alert...Adult ICU Electrolyte Replacement per Pharmacy        insulin regular  2-9 Units      And    dextrose bolus  25 g      benazepril  40 mg      enoxaparin (LOVENOX) injection  40 mg      furosemide  40 mg      acetaminophen  650 mg      methylPREDNISolone  80 mg      melatonin  5 mg      senna-docusate  2 Tablet      And    polyethylene glycol/lytes  1 Packet      And    magnesium hydroxide  30 mL      And    bisacodyl  10 mg      Respiratory Therapy Consult        ipratropium-albuterol  3 mL      rosuvastatin  20 mg      sertraline  100 mg      amLODIPine  10 mg      fluticasone  2 Puff      umeclidinium-vilanterol  1 Puff      metoprolol SR  25 mg      ipratropium-albuterol  3 mL          Current Outpatient Medications   Medication Instructions    amLODIPine (NORVASC) 5 mg, Oral, DAILY    benazepril (LOTENSIN) 40 mg, Oral, DAILY    fluticasone-umeclidin-vilant (TRELEGY ELLIPTA) 100-62.5-25 MCG/ACT AEROSOL POWDER, BREATH ACTIVATED inhalation 1 Inhalation., Inhalation, DAILY    furosemide (LASIX) 20 mg, Oral, DAILY    ipratropium-albuterol (DUONEB) 0.5-2.5 (3) MG/3ML nebulizer solution 3 mL, Nebulization, 4 TIMES DAILY    ipratropium-albuterol (DUONEB) 0.5-2.5 (3) MG/3ML nebulizer solution 3 mL, Nebulization, EVERY 2 HOURS PRN    metoprolol SR (TOPROL XL) 25 mg, Oral, DAILY    predniSONE (DELTASONE) 20 mg, Oral, DAILY    rosuvastatin (CRESTOR) 20 mg, Oral, EVERY EVENING    sertraline (ZOLOFT) 100 mg, Oral, DAILY

## 2023-03-25 NOTE — CARE PLAN
The patient is Watcher - Medium risk of patient condition declining or worsening    Shift Goals  Clinical Goals: optimize oxygenation, wean down o2, tolerates activity, diures,  Patient Goals: feel better  Family Goals: jakob    Progress made toward(s) clinical / shift goals:    Problem: Knowledge Deficit - Standard  Goal: Patient and family/care givers will demonstrate understanding of plan of care, disease process/condition, diagnostic tests and medications  Outcome: Progressing     Problem: Skin Integrity  Goal: Skin integrity is maintained or improved  Outcome: Progressing     Problem: Pain - Standard  Goal: Alleviation of pain or a reduction in pain to the patient’s comfort goal  Outcome: Progressing     Problem: Fall Risk  Goal: Patient will remain free from falls  Outcome: Progressing     Problem: Psychosocial  Goal: Patient's level of anxiety will decrease  Outcome: Progressing  Goal: Patient's ability to verbalize feelings about condition will improve  Outcome: Progressing  Goal: Patient's ability to re-evaluate and adapt role responsibilities will improve  Outcome: Progressing  Goal: Patient and family will demonstrate ability to cope with life altering diagnosis and/or procedure  Outcome: Progressing  Goal: Spiritual and cultural needs incorporated into hospitalization  Outcome: Progressing     Problem: Communication  Goal: The ability to communicate needs accurately and effectively will improve  Outcome: Progressing     Problem: Discharge Barriers/Planning  Goal: Patient's continuum of care needs are met  Outcome: Progressing     Problem: Hemodynamics  Goal: Patient's hemodynamics, fluid balance and neurologic status will be stable or improve  Outcome: Progressing     Problem: Fluid Volume  Goal: Fluid volume balance will be maintained  Outcome: Progressing     Problem: Risk for Aspiration  Goal: Patient's risk for aspiration will be absent or decrease  Outcome: Progressing     Problem: Nutrition  Goal:  Patient's nutritional and fluid intake will be adequate or improve  Outcome: Progressing  Goal: Enteral nutrition will be maintained or improve  Outcome: Progressing  Goal: Enteral nutrition will be maintained or improve  Outcome: Progressing     Problem: Urinary Elimination  Goal: Establish and maintain regular urinary output  Outcome: Progressing     Problem: Bowel Elimination  Goal: Establish and maintain regular bowel function  Outcome: Progressing     Problem: Gastrointestinal Irritability  Goal: Nausea and vomiting will be absent or improve  Outcome: Progressing       Patient is not progressing towards the following goals:      Problem: Respiratory  Goal: Patient will achieve/maintain optimum respiratory ventilation and gas exchange  Outcome: Not Progressing  UNABLE TO TOLERATE ACTIVITY, UNABLE TO WEAN ON O2, PT REMAINS AXIOUS AT TIMES

## 2023-03-25 NOTE — CONSULTS
Pulmonary Consultation    Date of consult: 3/25/2023    Referring Physician  Vince Daniels D.O.    Reason for Consultation  Hypoxic respiratory failure     History of Presenting Illness  57 y.o. male who presented 3/21/2023 with a history of HFpEF, HTN, and cerebral aneurysm about 10 years ago.  He reports he was admitted at that time for a while.  He smokes, previously a pack a day but quit 2 years ago.  He denies significant shortness of breath at baseline.  He previously worked as a  but has been officially on disability since his aneurysm 10 years ago.  He denies wheezing or chronic cough.  Recently he has had several courses of steroids for possible pneumonias. He presented to an OSH with acute hypoxic respiratory failure and underwent CT scan with diffuse GGO with areas of mosaicism essentially from top to bottom in the lungs with no geographic predominance. Of note, when he was admitted with his aneurysm he was intubated for 8 days with diffuse patchy GGO on CT which was considered to be aspiration pneumonia at the time but appears to have upper lobe involvement and a pattern more consistent with ILD or inflammation.  Here he has been managed on BIPAP but is in increasing respiratory distress.  His infiltrates are becoming more dense and appear consistent with ARDS.  His volumes are low potentially due to abdominal obesity and fatigue.      Aldolase is positive, CKMB negative.  CTD  panels pending    Significant family history of COPD and smoking but no other lung diseases per patient's mother.  No CTD history in the family either.  He cannot provide a clear history about muscle weakness or pain.      Code Status  Full Code    Review of Systems  Review of Systems   Constitutional:  Positive for malaise/fatigue. Negative for chills and fever.   HENT:  Positive for congestion.    Respiratory:  Positive for cough and shortness of breath. Negative for sputum production.    Cardiovascular:  Positive for leg  swelling. Negative for chest pain and palpitations.   Gastrointestinal:  Negative for heartburn.   Neurological:  Negative for dizziness and headaches.   Endo/Heme/Allergies:  Positive for environmental allergies.     Past Medical History   has a past medical history of Alcohol withdrawal delirium (HCC) (2/25/2013), Arthritis, Aspiration pneumonia (HCC) (3/5/2013), Backpain, Depression, Hospital acquired PNA (3/23/2023), Hypertension, Indigestion, and Stroke (Formerly [facility]).    He has no past medical history of Angina, Arrhythmia, ASTHMA, Bronchitis, Cancer (Formerly [facility]), CATARACT, Congestive heart failure (HCC), COPD, Diabetes, Dialysis, Fall, Glaucoma, Heart murmur, Heart valve disease, Jaundice, Myocardial infarct (Formerly [facility]), Other specified symptom associated with female genital organs, Pacemaker, Personal history of venous thrombosis and embolism, Renal disorder, Rheumatic fever, Seizure (Formerly [facility]), Unspecified disorder of thyroid, Unspecified hemorrhagic conditions, or Unspecified urinary incontinence.    Surgical History   has a past surgical history that includes other neurological surg.    Family History  family history includes Diabetes in his father; Hypertension in his father; Lung Disease in his father.    Social History   reports that he has been smoking cigarettes. He has a 16.00 pack-year smoking history. He has never used smokeless tobacco. He reports that he does not drink alcohol and does not use drugs.    Medications  Home Medications       Reviewed by Leanna Mejia, PharmD (Pharmacist) on 03/24/23 at 1555  Med List Status: Complete     Medication Last Dose Status   amlodipine (NORVASC) 5 MG TABS  Active   benazepril (LOTENSIN) 20 MG Tab  Active   fluticasone-umeclidin-vilant (TRELEGY ELLIPTA) 100-62.5-25 MCG/ACT AEROSOL POWDER, BREATH ACTIVATED inhalation  Active   furosemide (LASIX) 20 MG Tab  Active   ipratropium-albuterol (DUONEB) 0.5-2.5 (3) MG/3ML nebulizer solution  Active   ipratropium-albuterol (DUONEB) 0.5-2.5  (3) MG/3ML nebulizer solution  Active   metoprolol SR (TOPROL XL) 25 MG TABLET SR 24 HR  Active   predniSONE (DELTASONE) 20 MG Tab  Active   rosuvastatin (CRESTOR) 20 MG Tab  Active   sertraline (ZOLOFT) 100 MG Tab  Active                  Current Facility-Administered Medications   Medication Dose Route Frequency Provider Last Rate Last Admin    cefepime (Maxipime) 2 g in  mL IVPB  2 g Intravenous Q8HRS Vince Daniels D.O.   Stopped at 03/25/23 1240    insulin GLARGINE (Lantus,Semglee) injection  20 Units Subcutaneous BID Vince Daniels D.O.   20 Units at 03/25/23 0709    MD Alert...ICU Electrolyte Replacement per Pharmacy   Other PHARMACY TO DOSE Vince Daniels D.O.        insulin regular (HumuLIN R,NovoLIN R) injection  2-9 Units Subcutaneous Q6HRS Rody Ware M.D.   6 Units at 03/25/23 1159    And    dextrose 10 % BOLUS 25 g  25 g Intravenous Q15 MIN PRN Rody Ware M.D.        benazepril (LOTENSIN) tablet 40 mg  40 mg Oral DAILY Chino Mendoza M.D.   40 mg at 03/25/23 0645    enoxaparin (Lovenox) inj 40 mg  40 mg Subcutaneous Q12HRS CLARK PinoOSimran   40 mg at 03/25/23 0644    furosemide (LASIX) injection 40 mg  40 mg Intravenous BID DIURETIC Rody Ware M.D.   40 mg at 03/25/23 0644    acetaminophen (Tylenol) tablet 650 mg  650 mg Oral Q6HRS PRN Rody Ware M.D.   650 mg at 03/24/23 2135    methylPREDNISolone (SOLU-MEDROL) 40 MG injection 80 mg  80 mg Intravenous Q8HRS CLARK PinoOSimran   80 mg at 03/25/23 0643    melatonin tablet 5 mg  5 mg Oral Nightly Chino Mendoza M.D.   5 mg at 03/24/23 2129    senna-docusate (PERICOLACE or SENOKOT S) 8.6-50 MG per tablet 2 Tablet  2 Tablet Oral BID Chino Mendoza M.D.   2 Tablet at 03/25/23 0644    And    polyethylene glycol/lytes (MIRALAX) PACKET 1 Packet  1 Packet Oral QDAY PRN Chino Mendoza M.D.        And    magnesium hydroxide (MILK OF MAGNESIA) suspension 30 mL  30 mL Oral QDAY PRN Chino Mendoza M.D.         And    bisacodyl (DULCOLAX) suppository 10 mg  10 mg Rectal QDAY PRN Chino Mendoza M.D.        Respiratory Therapy Consult   Nebulization Continuous RT Chino Mendoza M.D.        ipratropium-albuterol (DUONEB) nebulizer solution  3 mL Nebulization Q4H PRN (RT) Chino Mendoza M.D.   3 mL at 03/21/23 2338    rosuvastatin (CRESTOR) tablet 20 mg  20 mg Oral Q EVENING Chino Mendoza M.D.   20 mg at 03/24/23 1713    sertraline (Zoloft) tablet 100 mg  100 mg Oral DAILY Chino Mendoza M.D.   100 mg at 03/25/23 0644    amLODIPine (NORVASC) tablet 10 mg  10 mg Oral DAILY Chino Mendoza M.D.   10 mg at 03/25/23 0645    fluticasone (FLOVENT HFA) 44 MCG/ACT inhaler 88 mcg  2 Puff Inhalation QDAILY (RT) Chino Mendoza M.D.   88 mcg at 03/25/23 0718    umeclidinium-vilanterol (Anoro Ellipta) inhaler 1 Puff  1 Puff Inhalation QDAILY (RT) Chino Mendoza M.D.   1 Puff at 03/25/23 0718    metoprolol SR (TOPROL XL) tablet 25 mg  25 mg Oral DAILY Chino Mendoza M.D.   25 mg at 03/24/23 0631    ipratropium-albuterol (DUONEB) nebulizer solution  3 mL Nebulization Q4HRS (RT) Brennan Martin M.D.   3 mL at 03/25/23 1214       Allergies  No Known Allergies    Vital Signs last 24 hours  Temp:  [35.9 °C (96.7 °F)-36.9 °C (98.4 °F)] 36.1 °C (97 °F)  Pulse:  [54-86] 86  Resp:  [25-61] 32  BP: ()/(50-74) 96/57  SpO2:  [85 %-97 %] 90 %    Physical Exam  Physical Exam  Constitutional:       Appearance: He is obese. He is ill-appearing.   HENT:      Head: Atraumatic.      Mouth/Throat:      Mouth: Mucous membranes are dry.   Eyes:      Extraocular Movements: Extraocular movements intact.   Cardiovascular:      Rate and Rhythm: Normal rate and regular rhythm.      Heart sounds: Normal heart sounds.   Pulmonary:      Breath sounds: No wheezing or rhonchi.      Comments: Lungs are difficult to auscultate due to body habitus and BIPAP.  Mild faint crackles. No rhronchi     Abdominal:      Palpations: Abdomen is soft.    Musculoskeletal:         General: No swelling, tenderness or deformity.   Skin:     General: Skin is warm and dry.   Neurological:      General: No focal deficit present.      Mental Status: He is alert and oriented to person, place, and time.       Fluids    Intake/Output Summary (Last 24 hours) at 3/25/2023 1309  Last data filed at 3/25/2023 0800  Gross per 24 hour   Intake 250 ml   Output 2350 ml   Net -2100 ml       Laboratory  Recent Results (from the past 48 hour(s))   CULTURE RESPIRATORY W/ GRM STN    Collection Time: 03/23/23  5:39 PM    Specimen: Sputum; Respirate   Result Value Ref Range    Significant Indicator POS (POS)     Source RESP     Site SPUTUM     Culture Result Rare growth usual upper respiratory ivis (A)     Gram Stain Result       Few WBCs.  Few Gram negative rods.  Specimen Quality Score: 1+      Culture Result (A)      Pseudomonas species  Light growth  Pseudomonas koreensis  Organism identified by MALDI-TOF research use only library.     P. JIROVECII DFA RFLX PCR    Collection Time: 03/23/23  5:39 PM   Result Value Ref Range    Pneumocystis source Induced Sputum    GRAM STAIN    Collection Time: 03/23/23  5:39 PM    Specimen: Respirate   Result Value Ref Range    Significant Indicator .     Source RESP     Site SPUTUM     Gram Stain Result       Few WBCs.  Few Gram negative rods.  Specimen Quality Score: 1+     POCT glucose device results    Collection Time: 03/23/23  6:09 PM   Result Value Ref Range    POC Glucose, Blood 256 (H) 65 - 99 mg/dL   POCT glucose device results    Collection Time: 03/23/23 11:18 PM   Result Value Ref Range    POC Glucose, Blood 271 (H) 65 - 99 mg/dL   Basic Metabolic Panel (BMP)    Collection Time: 03/24/23  6:25 AM   Result Value Ref Range    Sodium 140 135 - 145 mmol/L    Potassium 3.8 3.6 - 5.5 mmol/L    Chloride 101 96 - 112 mmol/L    Co2 29 20 - 33 mmol/L    Glucose 190 (H) 65 - 99 mg/dL    Bun 42 (H) 8 - 22 mg/dL    Creatinine 0.72 0.50 - 1.40 mg/dL     Calcium 9.5 8.5 - 10.5 mg/dL    Anion Gap 10.0 7.0 - 16.0   CBC WITH DIFFERENTIAL    Collection Time: 03/24/23  6:25 AM   Result Value Ref Range    WBC 15.7 (H) 4.8 - 10.8 K/uL    RBC 5.33 4.70 - 6.10 M/uL    Hemoglobin 14.8 14.0 - 18.0 g/dL    Hematocrit 45.7 42.0 - 52.0 %    MCV 85.7 81.4 - 97.8 fL    MCH 27.8 27.0 - 33.0 pg    MCHC 32.4 (L) 33.7 - 35.3 g/dL    RDW 45.6 35.9 - 50.0 fL    Platelet Count 271 164 - 446 K/uL    MPV 9.9 9.0 - 12.9 fL    Neutrophils-Polys 85.60 (H) 44.00 - 72.00 %    Lymphocytes 6.60 (L) 22.00 - 41.00 %    Monocytes 6.80 0.00 - 13.40 %    Eosinophils 0.10 0.00 - 6.90 %    Basophils 0.10 0.00 - 1.80 %    Immature Granulocytes 0.80 0.00 - 0.90 %    Nucleated RBC 0.00 /100 WBC    Neutrophils (Absolute) 13.41 (H) 1.82 - 7.42 K/uL    Lymphs (Absolute) 1.04 1.00 - 4.80 K/uL    Monos (Absolute) 1.07 (H) 0.00 - 0.85 K/uL    Eos (Absolute) 0.02 0.00 - 0.51 K/uL    Baso (Absolute) 0.02 0.00 - 0.12 K/uL    Immature Granulocytes (abs) 0.13 (H) 0.00 - 0.11 K/uL    NRBC (Absolute) 0.00 K/uL   Sed Rate    Collection Time: 03/24/23  6:25 AM   Result Value Ref Range    Sed Rate Westergren 26 (H) 0 - 20 mm/hour   ESTIMATED GFR    Collection Time: 03/24/23  6:25 AM   Result Value Ref Range    GFR (CKD-EPI) 105 >60 mL/min/1.73 m 2   POCT glucose device results    Collection Time: 03/24/23  6:30 AM   Result Value Ref Range    POC Glucose, Blood 179 (H) 65 - 99 mg/dL   POCT glucose device results    Collection Time: 03/24/23  8:00 AM   Result Value Ref Range    POC Glucose, Blood 172 (H) 65 - 99 mg/dL   POCT arterial blood gas device results    Collection Time: 03/24/23  8:03 AM   Result Value Ref Range    Ph 7.466 7.400 - 7.500    Pco2 50.0 (H) 26.0 - 37.0 mmHg    Po2 52 (L) 64 - 87 mmHg    Tco2 38 (H) 20 - 33 mmol/L    S02 88 (L) 93 - 99 %    Hco3 36.1 (H) 17.0 - 25.0 mmol/L    BE 10 (H) -4 - 3 mmol/L    Body Temp 37.2 C degrees    Ph Temp Re 7.463 7.400 - 7.500    Pco2 Temp Co 50.5 (H) 26.0 - 37.0 mmHg     Po2 Temp Cor 53 (L) 64 - 87 mmHg    Specimen Arterial    POCT lactate device results    Collection Time: 03/24/23  8:03 AM   Result Value Ref Range    iStat Lactate 1.3 0.5 - 2.0 mmol/L   PROCALCITONIN    Collection Time: 03/24/23  8:41 AM   Result Value Ref Range    Procalcitonin 0.08 <0.25 ng/mL   CRP QUANTITIVE (NON-CARDIAC)    Collection Time: 03/24/23  8:41 AM   Result Value Ref Range    Stat C-Reactive Protein 1.09 (H) 0.00 - 0.75 mg/dL   POCT glucose device results    Collection Time: 03/24/23  8:46 AM   Result Value Ref Range    POC Glucose, Blood 170 (H) 65 - 99 mg/dL   POCT glucose device results    Collection Time: 03/24/23 11:02 AM   Result Value Ref Range    POC Glucose, Blood 184 (H) 65 - 99 mg/dL   POCT glucose device results    Collection Time: 03/24/23 12:15 PM   Result Value Ref Range    POC Glucose, Blood 278 (H) 65 - 99 mg/dL   POCT glucose device results    Collection Time: 03/24/23  5:12 PM   Result Value Ref Range    POC Glucose, Blood 259 (H) 65 - 99 mg/dL   POCT glucose device results    Collection Time: 03/25/23 12:30 AM   Result Value Ref Range    POC Glucose, Blood 137 (H) 65 - 99 mg/dL   Basic Metabolic Panel (BMP)    Collection Time: 03/25/23  1:35 AM   Result Value Ref Range    Sodium 141 135 - 145 mmol/L    Potassium 4.2 3.6 - 5.5 mmol/L    Chloride 100 96 - 112 mmol/L    Co2 30 20 - 33 mmol/L    Glucose 173 (H) 65 - 99 mg/dL    Bun 41 (H) 8 - 22 mg/dL    Creatinine 0.81 0.50 - 1.40 mg/dL    Calcium 9.4 8.5 - 10.5 mg/dL    Anion Gap 11.0 7.0 - 16.0   ESTIMATED GFR    Collection Time: 03/25/23  1:35 AM   Result Value Ref Range    GFR (CKD-EPI) 101 >60 mL/min/1.73 m 2   CBC WITH DIFFERENTIAL    Collection Time: 03/25/23  2:45 AM   Result Value Ref Range    WBC 16.1 (H) 4.8 - 10.8 K/uL    RBC 5.49 4.70 - 6.10 M/uL    Hemoglobin 14.8 14.0 - 18.0 g/dL    Hematocrit 46.6 42.0 - 52.0 %    MCV 84.9 81.4 - 97.8 fL    MCH 27.0 27.0 - 33.0 pg    MCHC 31.8 (L) 33.7 - 35.3 g/dL    RDW 43.9 35.9  - 50.0 fL    Platelet Count 242 164 - 446 K/uL    MPV 10.6 9.0 - 12.9 fL    Neutrophils-Polys 90.70 (H) 44.00 - 72.00 %    Lymphocytes 4.20 (L) 22.00 - 41.00 %    Monocytes 4.30 0.00 - 13.40 %    Eosinophils 0.10 0.00 - 6.90 %    Basophils 0.10 0.00 - 1.80 %    Immature Granulocytes 0.60 0.00 - 0.90 %    Nucleated RBC 0.00 /100 WBC    Neutrophils (Absolute) 14.61 (H) 1.82 - 7.42 K/uL    Lymphs (Absolute) 0.67 (L) 1.00 - 4.80 K/uL    Monos (Absolute) 0.69 0.00 - 0.85 K/uL    Eos (Absolute) 0.01 0.00 - 0.51 K/uL    Baso (Absolute) 0.01 0.00 - 0.12 K/uL    Immature Granulocytes (abs) 0.09 0.00 - 0.11 K/uL    NRBC (Absolute) 0.00 K/uL   Sed Rate    Collection Time: 03/25/23  2:45 AM   Result Value Ref Range    Sed Rate Westergren 34 (H) 0 - 20 mm/hour   POCT glucose device results    Collection Time: 03/25/23  7:05 AM   Result Value Ref Range    POC Glucose, Blood 174 (H) 65 - 99 mg/dL   POCT glucose device results    Collection Time: 03/25/23 11:55 AM   Result Value Ref Range    POC Glucose, Blood 336 (H) 65 - 99 mg/dL       Imaging  DX-CHEST-LIMITED (1 VIEW)   Final Result         Ill-defined opacifications in each lung have increased compared to the prior radiograph. This could indicate worsening of pulmonary edema or inflammation. Lungs are less expanded than on prior radiograph.      DX-CHEST-PORTABLE (1 VIEW)   Final Result         1.  Pulmonary edema and/or infiltrates are identified, which are stable since the prior exam.   2.  Cardiomegaly      DX-CHEST-PORTABLE (1 VIEW)   Final Result         1.  Pulmonary edema and/or infiltrates are identified, which are stable since the prior exam.   2.  Cardiomegaly      EC-ECHOCARDIOGRAM COMPLETE W/O CONT   Final Result      DX-CHEST-PORTABLE (1 VIEW)   Final Result      1.  Enlarged cardiac silhouette with diffuse bilateral interstitial and airspace opacities. This could be due to edema or multifocal pneumonia.      OUTSIDE IMAGES-DX CHEST   Final Result      US-FOREIGN  FILM ULTRASOUND   Final Result      OUTSIDE IMAGES-CT CHEST   Final Result          Assessment/Plan  #Acute hypoxic respiratory failure  #Diffuse GGO on CT - Aldolase positive but other labs unremarkable  #PSA pneumonia with ARDS  Plan:  - Recommend intubation due to patient's work of breathing and worsened respiratory status since yesterday  - Recommend ARDS therapy with proning after intubation  - I have discussed with the family the uncertainty of this situation.  The differential is very broad from PSA associated ARDS to possible myositis associated ILD and many other possibilities in between including PCP, HP, NSIP and .    - BAL when possible and please send cell differential, BAL galactomannan, lymphocyte subsets, PCP DFA, TB, fungal and respiratory culture/gram stain and cytology to path.  - Agree with steroids for now  - Diurese as much as possible  - Will need to send extended myositis panel   - CTD panel, and HP panels pending  - Consider repeat CT chest without contrast once intubated  - Continue abx for PSA  - Pulmonary will follow    Terrie Amaro MD RD  Pulmonary and Critical Care    Available on Voalte

## 2023-03-26 PROBLEM — J80 ARDS (ADULT RESPIRATORY DISTRESS SYNDROME) (HCC): Status: ACTIVE | Noted: 2023-01-01

## 2023-03-26 NOTE — PROCEDURES
Central Line Placement Procedure  Indication: central venous monitoring and centrally administered medications    Consent: Unable to be obtained due to the emergent nature of this procedure.    Procedure: The patient was positioned appropriately and the skin over the right internal jugular vein was prepped with betadine and draped in a sterile fashion. Local anesthesia was obtained by infiltration using 3.0 cc of 1% Lidocaine without epinephrine.  A large bore needle was used to identify the vein.  A guide wire was then inserted into the vein through the needle. A triple lumen catheter was then inserted into the vessel over the guide wire using the Seldinger technique.  All ports showed good, free flowing blood return and were flushed with saline solution.  The catheter was then securely fastened to the skin with sutures and covered with a sterile dressing.  A post procedure X-ray was ordered and showed good line position with no pneumothorax. Utilized bedside ultrasound for procedure.    The patient tolerated the procedure well.    Complications: None       Supervised the entire time by Dr. Daniels

## 2023-03-26 NOTE — PROGRESS NOTES
"Attending Staff Note     I have seen and examined the patient.  I have reviewed the medical record, laboratory data, imaging and all relevant studies.  I have discussed the plan of care with the Internal Medicine Resident and multidisciplinary team, and agree with the note and plan as documented.      \"57-year-old male, PMH ruptured SAH 2013, disabled since, HTN, obesity, prior smoker quit 2 years ago, COPD not usually oxygen dependent, recently admitted at [facility] 3/9 - 3/12 with presumed COPD exacerbation and pneumonia, treated with steroids and antibiotics, discharged home on 6 L oxygen.  After completed steroids he began feeling much worse, readmitted 3/19 with increasing respiratory distress, requiring high flow nasal cannula alternating with BiPAP and high FiO2.  CT scan of the chest 3/21 showed no evidence of pulm embolism, significant bilateral groundglass and reticular infiltrates throughout all lung fields.  He is started back on IV corticosteroids, broad-spectrum antibiotics for possible HCAP with cefepime and vancomycin and transferred to [facility] for further evaluation.  Further work-up there included a high CRP of 261, ESR 65, WBC 23 and no peripheral eosinophilia.     Patient worked as a  until his ruptured SAH in 2013 and has been on disability since.  He does not have any pussycats or birdies but does have a little puppy chiwennie.  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  He denies any recreational/illicit drug use.  He had not required supplemental oxygen until his recent hospitalization.\" - Dr. Martin's note    Hosp course:  3/21 admitted to ICU.  3/22 s/p solumedrol 500mg IV x1, followed with 80 Q8H  3/22 lasix 40 Q12H   3/25 unasyn changed to cefepime due to Pseudomonas on sputum culture     24 hour events  Patient would like to say goodbye to his children. A good size of family members " came by late afternoon to spend quality time.   Intubation was delayed last night until this morning.   Remains critically ill   Requiring BIPAP 100%, sat> 88%  Pt appears tiring while on BIPAP   CXR remains c/w ARDS  HR in 60-80s  SBP in 120-140s  Afebrile  WBC 16, plt 300s  Sodium 136  Creatinine 0.71  On lasix 40 Q12H   On cefepime (started 3/25)    Solumedrol 80 Q8H     On exam:  Pt alert, oriented. Appropriate.   On BIPAP  RRR  +diffuse crackles bilat  Abd exam benign  No edema.   Cap refill 2-3 secs     Labs and imaging were reviewed.   CT Chest w/o contrast from OSH was personally reviewed.   CXR with diffuse bilateral infiltrates  TTE with normal LVEF, dilated RV, normal fxn. Severe RVSP 65-70     Assessment:  56yo male, , former smoker with   Severe ARDS  Acute hypoxic respiratory failure   3. Diffuse bilateral parenchymal lung disease w/ predominant reticular/interstitial   4. Pulmonary hypertension RVSP 65-70  3. Hx of ruptured SAH in 2013  4. Former smoker, quit 2 years ago. Denies vaping, snorting any illegal substance or drugs. Pt works as a heavy  in the past.      ILD and ID w/u most came back negative, some pending.   THELMA, FLAVIO, RA, C3 C4 all negative. Aldolase positive, but CK negative.   HP and ANCA pending  Full resp panel negative, COVID negative x2, all ID work up negative. Procal negative  Pt has been aggressively being diuresed with -2L fluid balance daily.   CXR with diffuse bilateral infiltrates concerning of ARDS  Sputum Cx + Pseudomonas, pending sensitivity. Pending PJP DFA/PCR from sputum      Plan:   Severe ARDS   - Intubated, sedated with propofol and fentanyl. RASS -5  - Pt was still mildly dyssynchronized with propofol 80 and fentanyl 200  - Full vent support, low tidal volume stragegy 5cc/kg, FiO2 100%/ PEEP 12  - Start paralytics with vecuronium gtt  - Will start proning  - ABG 7.37/63/68 after intubation on FiO2 100%/ PEEP 12  - Goal sat  >88%, paO2 >55, Pplat <30  - Serial ABGs  - Continue solumedrol 80 Q8H  - Lasix 40 Q12H, goal negative 1-2L fluid balance daily   - Monitor renal function, electrolytes while diuresing.     Acute hypoxic resp failure  - Due to severe ARDS due to concern of suspected ILD, unclear etiology   - ARDS management as stated above.  - pt s/p bronchoscopy with BAL - sent cell count, CD4/CD8 profile, routine gs/cx, fungal/AFB smear/cx, PJP DFA, BAL asp galactomannan, cytology. Micro lab was called and confirmed that they received the specimen   - Follow up ILD and ID work up  - Solumedrol 80 Q8H  - Diuresis as above.   - On cefepime (tx for total 7 days for Pseudomonas in sputum, pending sensitivity).  - Will add doxycyline   - Will hold off for flolan at this time and reassess   - Repeat CT chest w/o contrast when things are settled    Pseudomonas from sputum culture  - Changed unasyn to cefepime 3/25. I don't think pseudomonas alone driving his severe ARDS, likely concomitant process with underlying ILD    Goal of care discussion   - Had long discussion with family regarding patient's poor prognosis given his severe ARDS. Patient and family understands the severity of the disease. I also discussed code status and he would like FULL CODE     I have assessed and reassessed his respiratory status, hemodynamics, cardiovascular and neurological status throughout the day.   He is at increased risk for worsening respiratory, cardiovascular and neurological system dysfunction. High risk of deterioration and worsening vital organ dysfunction and death without the above critical care interventions.     D/w Pulmonary Dr. Amaro     The patient remains critically ill. Critical care time = 170 mins in directly providing and coordinating critical care and extensive data review. No time overlap and excludes procedures.      Vince Daniels D.O.  Critical Care    Addendum  Pplat went up to mid 40s. I decreased TV to 4cc/kg and decreased PEEP  to 10. Serial ABGs with increased hypercapnea, pH >7.20  We were eventually set TV at 4.5cc/kg TV 320cc, RR 26, PEEP 10, FIO2 100%. Pplat in mid 30s.   Last ABG before proning showed 7.22/85    Plan:   Permissive hypercapnea, goal pH >7.20  ABG Q4H  Prone for 16 hours, supine tomorrow am.   Contniue full sedation, paralytics.     Vince Daniels D.O.

## 2023-03-26 NOTE — CARE PLAN
The patient is Unstable - High likelihood or risk of patient condition declining or worsening    Shift Goals  Clinical Goals: improve oxygenation, increase activity (EOB), rest  Patient Goals: Get home to my grandson  Family Goals: Make sure he comes home healthy    Progress made toward(s) clinical / shift goals:    Problem: Knowledge Deficit - Standard  Goal: Patient and family/care givers will demonstrate understanding of plan of care, disease process/condition, diagnostic tests and medications  Outcome: Progressing  Note: Patient and family understand and verbally accepted understanding of plan of care.      Problem: Pain - Standard  Goal: Alleviation of pain or a reduction in pain to the patient’s comfort goal  Outcome: Progressing  Note: Patient's pain was controlled with PRN tylenol and non-pharmacological measures.      Problem: Psychosocial  Goal: Patient's level of anxiety will decrease  Outcome: Progressing  Flowsheets (Taken 3/26/2023 0146)  Decrease Anxiety Level:   Encouraged support system participation   Implemented stimuli reduction, calming techniques       Patient is not progressing towards the following goals:      Problem: Mobility  Goal: Patient's capacity to carry out activities will improve  Outcome: Not Met  Flowsheets (Taken 3/26/2023 0146)  Mobility: Monitored for signs of activity intolerance  Note: Patient unable to perform any activity including EOB due to decrease in oxygen saturation and increase of SOB/WOB

## 2023-03-26 NOTE — PROGRESS NOTES
0805- 100 fentanyl, 30 etomidate, 100 rocuronium in    0806- positive color change, bilateral breath sounds.    8.0 tube 25 at the teeth    0820- timeout for Bronchoscopy    0857- timeout for central line placement    0925- timeout for arterial line placement

## 2023-03-26 NOTE — CARE PLAN
Pt. Is on Bipap 18/12 Fio2 100%,    During the Day pt. Switches on and off High Flow Nasal cannula at 60 lpm and Fio2 of 100%.      Problem: Ventilation  Goal: Ability to achieve and maintain unassisted ventilation or tolerate decreased levels of ventilator support  Description: Target End Date:  4 days     Document on Vent flowsheet    1.  Support and monitor invasive and noninvasive mechanical ventilation  2.  Monitor ventilator weaning response  3.  Perform ventilator associated pneumonia prevention interventions  4.  Manage ventilation therapy by monitoring diagnostic test results  Outcome: Progressing

## 2023-03-26 NOTE — RESPIRATORY CARE
COPD EDUCATION by COPD CLINICAL EDUCATOR  3/26/2023 at 11:42 AM by Elizabeth Galicia, RRT     Patient unable to participate in bedside education. Our team will follow and be available

## 2023-03-26 NOTE — CARE PLAN
The patient is Watcher - Medium risk of patient condition declining or worsening    Shift Goals  Clinical Goals: wean down o2, tolerates activity, compliance with medical therapy  Patient Goals: feel better  Family Goals: jakob    Progress made toward(s) clinical / shift goals:  pt able to control bgl at optimum level,     Problem: Knowledge Deficit - Standard  Goal: Patient and family/care givers will demonstrate understanding of plan of care, disease process/condition, diagnostic tests and medications  Outcome: Progressing     Problem: Skin Integrity  Goal: Skin integrity is maintained or improved  Outcome: Progressing     Problem: Pain - Standard  Goal: Alleviation of pain or a reduction in pain to the patient’s comfort goal  Outcome: Progressing     Problem: Fall Risk  Goal: Patient will remain free from falls  Outcome: Progressing     Problem: Psychosocial  Goal: Patient's level of anxiety will decrease  Outcome: Progressing  Goal: Patient's ability to verbalize feelings about condition will improve  Outcome: Progressing  Goal: Patient's ability to re-evaluate and adapt role responsibilities will improve  Outcome: Progressing  Goal: Patient and family will demonstrate ability to cope with life altering diagnosis and/or procedure  Outcome: Progressing  Goal: Spiritual and cultural needs incorporated into hospitalization  Outcome: Progressing     Problem: Communication  Goal: The ability to communicate needs accurately and effectively will improve  Outcome: Progressing     Problem: Hemodynamics  Goal: Patient's hemodynamics, fluid balance and neurologic status will be stable or improve  Outcome: Progressing     Problem: Fluid Volume  Goal: Fluid volume balance will be maintained  Outcome: Progressing     Problem: Dysphagia  Goal: Dysphagia will improve  Outcome: Progressing     Problem: Risk for Aspiration  Goal: Patient's risk for aspiration will be absent or decrease  Outcome: Progressing     Problem:  Nutrition  Goal: Patient's nutritional and fluid intake will be adequate or improve  Outcome: Progressing     Problem: Urinary Elimination  Goal: Establish and maintain regular urinary output  Outcome: Progressing     Problem: Bowel Elimination  Goal: Establish and maintain regular bowel function  Outcome: Progressing       Patient is not progressing towards the following goals:  Unable to wean down on o2. Pt still desats in 70s when weaning down. Pt remains anxious at times. Pt still unable to tolerate activity without desaturation in o2.      Problem: Respiratory  Goal: Patient will achieve/maintain optimum respiratory ventilation and gas exchange  Outcome: Not Progressing

## 2023-03-26 NOTE — ASSESSMENT & PLAN NOTE
Yudelka 2/2 to PSA PNA  *Intubated 3/26, pending work-up for cause as below for acute respiratory failure with hypoxia  *PaO2/FiO2 ratio 137 mm Hg, Moderate ARDS  *s/p 3 days of Solumedrol 250 mg q6h- no responsive     -Pulm recommends solumedrol 40 mg q12h and down-titrate, consider TKI Nintedanib if progression suspected, palliative, signed off (3/31/23)  -Continue w/ solumedrol 40 mg q12h (3/30-)  -Management as below for acute respiratory failure with hypoxia  -Increasing CO2 retention, ventilator settings optimized, will continue to monitor  -Goal sat >88%, paO2 >55, Pplat <30  -Serial ABGs  -CXR to monitor lung volumes and tube/line placement   -VAP bundle prevention, oral care, post pyloric feeding   -Head of bed > 30 degree   -GI prophylaxis   -Daily awakening and SBT trials unless contraindicated   -Monitor for liberation   -Respiratory treatments: prn

## 2023-03-26 NOTE — PROGRESS NOTES
1900- Patient sitting up in bed with humidified high flow O2 with a oxygen saturation of 80-86% with a good wave form. Family at bedside and stated they are visiting before he gets intubated.     Due to patient's deterioration of oxygen saturation it was discussed with patient and patient's family about possible intubation by day shift. Patient and patient's family understood and agreed to intubation. NOC RN clarified at beginning of shift with  about possible intubation.  at bedside to discuss further with patient and family. Family and patient verbally agreed to intubation.     2058- Dr. Moreno advised FADY RN per  that he would not be intubated tonight (3/25/2023) unless patient deteriorates and there is an emergent need.

## 2023-03-26 NOTE — PROCEDURES
Arterial Line Placement Procedure                  Indication: arterial blood gases, mechanical ventilation, and cardiovascular instability  Consent: Unable to be obtained due to the emergent nature of this procedure.    Myles's Test: Not indicated in this particular procedure    Procedure: The skin over the left radial artery was prepped with betadine and draped in a sterile fashion.  Local anesthesia was not performed due to the emergent nature of this procedure.  A 20 gauge arterial line catheter was then inserted, using a modified Seldinger technique, into the vessel.  The transducer set was then attached and securely fastened to the skin with sutures.  Waveforms on the monitor were observed and found to be adequate.  The patient had good distal perfusion after the procedure. The site was then dressed in a sterile fashion.Bedside US imaging guidance used for the procedure.    The patient tolerated the procedure well.     Complications: None    Supervised the entire time by Dr. Daniels

## 2023-03-26 NOTE — PROGRESS NOTES
Pulmonary Progress Note    Date of admission  3/21/2023    Chief Complaint  57 y.o. male admitted 3/21/2023 with acute hypoxic respiratory failure    Hospital Course  57 y.o. male who presented 3/21/2023 with a history of HFpEF, HTN, and cerebral aneurysm about 10 years ago.  He reports he was admitted at that time for a while.  He smokes, previously a pack a day but quit 2 years ago.  He denies significant shortness of breath at baseline.  He previously worked as a  but has been officially on disability since his aneurysm 10 years ago.  He denies wheezing or chronic cough.  Recently he has had several courses of steroids for possible pneumonias. He presented to an OSH with acute hypoxic respiratory failure and underwent CT scan with diffuse GGO with areas of mosaicism essentially from top to bottom in the lungs with no geographic predominance. Of note, when he was admitted with his aneurysm he was intubated for 8 days with diffuse patchy GGO on CT which was considered to be aspiration pneumonia at the time but appears to have upper lobe involvement and a pattern more consistent with ILD or inflammation.  Here he has been managed on BIPAP but is in increasing respiratory distress.  His infiltrates are becoming more dense and appear consistent with ARDS.  His volumes are low potentially due to abdominal obesity and fatigue.       Aldolase is positive, CKMB negative.  CTD  panels pending     Significant family history of COPD and smoking but no other lung diseases per patient's mother.  No CTD history in the family either.  He cannot provide a clear history about muscle weakness or pain.  No birds at home or farm exposures.     Interval Problem Update  Reviewed last 24 hour events:  3/26: Intubated in am, plan for proning.  Myositis panel ordered.    Review of Systems  Review of Systems   Unable to perform ROS: Intubated      Vital Signs for last 24 hours   Temp:  [36.2 °C (97.2 °F)-36.6 °C (97.9 °F)] 36.6 °C  (97.9 °F)  Pulse:  [56-94] 94  Resp:  [18-37] 26  BP: (101-136)/(57-75) 120/68  SpO2:  [80 %-98 %] 98 %    Hemodynamic parameters for last 24 hours       Respiratory Information for the last 24 hours  Vent Mode: APVCMV  Rate (breaths/min): 26  Vt Target (mL): 370  PEEP/CPAP: 12  MAP: 15  Control VTE (exp VT): 6.1    Physical Exam   Physical Exam  Constitutional:       Appearance: He is ill-appearing.   HENT:      Head: Atraumatic.      Mouth/Throat:      Mouth: Mucous membranes are dry.   Eyes:      Extraocular Movements: Extraocular movements intact.   Cardiovascular:      Rate and Rhythm: Normal rate and regular rhythm.   Pulmonary:      Breath sounds: No wheezing or rhonchi.   Abdominal:      Palpations: Abdomen is soft.   Musculoskeletal:         General: No swelling, tenderness or deformity.   Skin:     General: Skin is warm and dry.   Neurological:      Comments: Intubated and sedated       Medications  Current Facility-Administered Medications   Medication Dose Route Frequency Provider Last Rate Last Admin    FENTANYL CITRATE (PF) 0.05 MG/ML INJ SOLN (WRAPPED)             Pharmacy Consult: Enteral tube insertion - review meds/change route/product selection   Other PHARMACY TO DOSE Vince Daniels D.O.        [START ON 3/27/2023] metoprolol tartrate (LOPRESSOR) tablet 12.5 mg  12.5 mg Enteral Tube TWICE DAILY Vince Daniels D.O.        senna-docusate (PERICOLACE or SENOKOT S) 8.6-50 MG per tablet 2 Tablet  2 Tablet Enteral Tube BID Vince Daniels D.O.        And    polyethylene glycol/lytes (MIRALAX) PACKET 1 Packet  1 Packet Enteral Tube QDAY PRN Vince Daniels D.O.        And    magnesium hydroxide (MILK OF MAGNESIA) suspension 30 mL  30 mL Enteral Tube QDAY PRN Vince Daniels D.O.        And    bisacodyl (DULCOLAX) suppository 10 mg  10 mg Rectal QDAY PRN Vince Daniels D.O.        [START ON 3/27/2023] amLODIPine (NORVASC) tablet 10 mg  10 mg Enteral Tube DAILY Vince Daniels D.O.        [START ON 3/27/2023] benazepril  (LOTENSIN) tablet 40 mg  40 mg Enteral Tube DAILY CLARK PinoO.        melatonin tablet 5 mg  5 mg Enteral Tube Nightly CLARK PinoO.        [START ON 3/27/2023] sertraline (Zoloft) tablet 100 mg  100 mg Enteral Tube DAILY CHANG Pino.O.        acetaminophen (TYLENOL) tablet 1,000 mg  1,000 mg Enteral Tube Q6HRS PRN CLARK PinoO.        rosuvastatin (CRESTOR) tablet 20 mg  20 mg Enteral Tube Q EVENING CHANG Pino.O.        Respiratory Therapy Consult   Nebulization Continuous RT CHANG Pino.O.        famotidine (PEPCID) tablet 20 mg  20 mg Enteral Tube Q12HRS CHANG Pino.O.        Or    famotidine (PEPCID) injection 20 mg  20 mg Intravenous Q12HRS CHANG Pino.O.   20 mg at 03/26/23 1315    MD Alert...ICU Electrolyte Replacement per Pharmacy   Other PHARMACY TO DOSE CLARK PinoO.        lidocaine (XYLOCAINE) 1 % injection 2 mL  2 mL Tracheal Tube Q30 MIN PRN CLARK PinoO.        midazolam (Versed) injection 1 mg  1 mg Intravenous Once Duarte Paniagua Jr., M.D.        norepinephrine (Levophed) 8 mg in 250 mL NS infusion (premix)  0-1 mcg/kg/min (Ideal) Intravenous Continuous CHANG Pino.O. 23.3 mL/hr at 03/26/23 1331 0.16 mcg/kg/min at 03/26/23 1331    artificial tears (EYE LUBRICANT) ophth ointment 1 Application.  1 Application. Both Eyes Q8HRS CHANG Pino.O.        propofol (DIPRIVAN) injection  0-80 mcg/kg/min (Ideal) Intravenous Continuous CHANG Pino.O. 37.2 mL/hr at 03/26/23 1305 80 mcg/kg/min at 03/26/23 1305    fentaNYL (SUBLIMAZE) 50 mcg/mL in 50mL (Continuous Infusion)   Intravenous Continuous CHANG Pino.O. 6 mL/hr at 03/26/23 1039 300 mcg/hr at 03/26/23 1039    vecuronium (NORCURON) 60 mg in dextrose 5% 500 mL Infusion  0-1.7 mcg/kg/min (Ideal) Intravenous Continuous Vince Daniels D.O. 38.8 mL/hr at 03/26/23 1152 1 mcg/kg/min at 03/26/23 1152    vecuronium (NORCURON) injection 7.76 mg  0.1 mg/kg (Ideal) Intravenous Q2HRS PRN CLARK PinoO.        potassium chloride in  water (KCL) ivpb **Administer in ICU only** 20 mEq  20 mEq Intravenous Once Vince Daniels D.OSimran   Stopped at 03/26/23 1355    cefepime (Maxipime) 2 g in  mL IVPB  2 g Intravenous Q8HRS CHANG Pino.O.   Stopped at 03/26/23 0600    insulin GLARGINE (Lantus,Semglee) injection  20 Units Subcutaneous BID CHANG Pino.O.   20 Units at 03/26/23 0600    insulin regular (HumuLIN R,NovoLIN R) injection  2-9 Units Subcutaneous Q6HRS Rody Ware M.D.   3 Units at 03/26/23 1327    And    dextrose 10 % BOLUS 25 g  25 g Intravenous Q15 MIN PRN Rody Ware M.D.        enoxaparin (Lovenox) inj 40 mg  40 mg Subcutaneous Q12HRS Vince Daniels D.O.   40 mg at 03/26/23 0531    furosemide (LASIX) injection 40 mg  40 mg Intravenous BID DIURETIC Rody Ware M.D.   40 mg at 03/26/23 0515    methylPREDNISolone (SOLU-MEDROL) 40 MG injection 80 mg  80 mg Intravenous Q8HRS CHANG Pino.O.   80 mg at 03/26/23 0515    ipratropium-albuterol (DUONEB) nebulizer solution  3 mL Nebulization Q4H PRN (RT) Chino Mendoza M.D.   3 mL at 03/21/23 2338    [Held by provider] fluticasone (FLOVENT HFA) 44 MCG/ACT inhaler 88 mcg  2 Puff Inhalation QDAILY (RT) Chino Mendoza M.D.   88 mcg at 03/25/23 0718    [Held by provider] umeclidinium-vilanterol (Anoro Ellipta) inhaler 1 Puff  1 Puff Inhalation QDAILY (RT) Chino Mendoza M.D.   1 Puff at 03/25/23 0718    ipratropium-albuterol (DUONEB) nebulizer solution  3 mL Nebulization Q4HRS (RT) Brennan Martin M.D.   3 mL at 03/26/23 0621       Fluids    Intake/Output Summary (Last 24 hours) at 3/26/2023 1343  Last data filed at 3/26/2023 1331  Gross per 24 hour   Intake 241 ml   Output 1243 ml   Net -1002 ml       Laboratory  Recent Labs     03/24/23  0803 03/26/23  0958   ISTATAPH 7.466 7.370*   ISTATAPCO2 50.0* 62.8*   ISTATAPO2 52* 68   ISTATATCO2 38* 38*   PASBTTA4DCD 88* 92*   ISTATARTHCO3 36.1* 36.3*   ISTATARTBE 10* 8*   ISTATTEMP 37.2 C 96.7 F   ISTATFIO2  --   100   ISTATSPEC Arterial Arterial   ISTATAPHTC 7.463 7.386*   TOAZJBZF0DT 53* 64         Recent Labs     03/24/23  0625 03/25/23  0135 03/26/23  0545   SODIUM 140 141 136   POTASSIUM 3.8 4.2 3.5*   CHLORIDE 101 100 109   CO2 29 30 27   BUN 42* 41* 37*   CREATININE 0.72 0.81 0.71   CALCIUM 9.5 9.4 7.7*     Recent Labs     03/24/23  0625 03/25/23  0135 03/26/23  0545   GLUCOSE 190* 173* 172*     Recent Labs     03/24/23  0625 03/25/23  0245 03/26/23  0545   WBC 15.7* 16.1* 16.0*   NEUTSPOLYS 85.60* 90.70* 89.50*   LYMPHOCYTES 6.60* 4.20* 5.70*   MONOCYTES 6.80 4.30 4.10   EOSINOPHILS 0.10 0.10 0.10   BASOPHILS 0.10 0.10 0.10     Recent Labs     03/24/23 0625 03/25/23  0245 03/26/23  0545   RBC 5.33 5.49 5.08   HEMOGLOBIN 14.8 14.8 13.9*   HEMATOCRIT 45.7 46.6 42.0   PLATELETCT 271 242 222       Imaging  X-Ray:  I have personally reviewed the images and compared with prior images.  CT:    Reviewed  Echo:   Reviewed    Assessment/Plan  #Acute hypoxic respiratory failure  #Diffuse GGO on CT - The differential is very broad from PSA associated ARDS to possible myositis associated ILD and many other possibilities in between including PCP, HP, NSIP and .   - ANCA - MPO & PR3 negative   - THELMA negative   - CTD panel negative   - HP panel pending   - Aldolase positive   - ESR/CRP elevated   - CPK normal   - Sputum DFA negative   - Culture with PSA + growth   - BAL 3/26 - pending cell differential, BAL galactomannan, lymphocyte subsets, PCP DFA, TB, fungal and respiratory culture/gram stain and cytology.  #PSA pneumonia with ARDS  Plan:  - Will assess patient's progress now intubated and proning  - I have discussed with the family the uncertainty of this situation and the possibility of vent dependent respiratory failure.   - f/u BAL studies  - Agree with steroids for now  - Diurese as much as possible  - Sent extended myositis panel under misc on 3/26 - [facility] [id number]  -  panels pending  - Consider repeat CT chest without  contrast once intubated for reassessment of consolidation vs. GGO  - Continue abx for PSA  - Consider Bactrim for PJP coverage until DFA from BAL is negative  - Pulmonary will follow     Terrie Amaro MD RD  Pulmonary and Critical Care     Available on Voalte

## 2023-03-26 NOTE — PROGRESS NOTES
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 Base was near place called [place], possible asbestos exposure.   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 Respirators didn't work well. Had to go in prior to quarantine. Face was red from work. Doctor told him that exposure was poisoning him.   Worked in [employer] with paper.   Worked at [email or web address].   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   Stopped smoking in May 2021, started smoking at age 16. Has two dogs and a cat.

## 2023-03-26 NOTE — PROCEDURES
BRONCHOSCOPY PROCEDURE NOTE      Date: 3/26/2023  Time: 8:43 AM    Time out: performed. Name, MRN, allergy and procedure were confirmed.     Indication: acute hypoxic resp failure, severe ARDS    Consent: Informed consent obtained from patient after explaining the benefits/risks of the procedure including but not limited to bleeding, infection, airway trauma or loss thereof, pneumothorax/hemothorax, arrythmia, or death. Patient or surrogate expressed understanding and agreement and signed consent which can be found in the patient's chart.    Procedure: Bronchoscopy with bronchoalveolar lavage and therapeutic aspiration of secretions    Sedation: propofol, fentanyl    Findings:  Respiratory therapy and nursing at bedside throughout procedure. Patient provided sedation and analgesia throughout the procedure. Placed on full ventilator support with an FiO2 of 100% during procedure. Using a fiberoptic bronchoscope, trachea entered via ET tube.  Airways visualized directly and careful inspection of airway was accomplished.     Kimmie, right main stem, RUL, BI, RML, RLL were inspected. Open and clear, no mucus, endobronchial lesions. RML was wedged and BAL was obtained in the right middle lobe. About 30cc of BAL was collected and sent for analysis. Attention was then turned to the left main stem, ЕЛЕНА, lingula, LLL. All appears clear, no mucus, no blood, no endobronchial lesions.   All secretions were suctioned and cleared.     Specimen sent to microbiology/pathology: cell count, routine gs/cx, fungal/AFB smear/culture, lymphocyte subset, BAL aspergillus galactomanan, cytology, PJP DFA    Complications:   Patient tolerated procedure well without any difficulties    Estimated blood loss: none      Vince Daniels D.O.  Critical Care Medicine

## 2023-03-26 NOTE — PROCEDURES
Intubation    Date/Time: 3/26/2023 10:30 AM  Performed by: Vince Daniels D.O.  Authorized by: Vince Daniels D.O.     Consent:     Consent obtained:  Verbal    Consent given by:  Patient and spouse    Risks discussed:  Aspiration    Alternatives discussed:  No treatment  Universal protocol:     Patient identity confirmed:  Verbally with patient  Pre-procedure details:     Patient status:  Awake    Mallampati score:  I    Pretreatment medications:  Fentanyl (100 fentanyl)    Paralytics:  Rocuronium (100mg rocuronium)  Procedure details:     Preoxygenation:  BiPAP    CPR in progress: no      Intubation method:  Oral    Oral intubation technique:  Video-assisted    Laryngoscope type:  GlideScope    Laryngoscope blade:  Mac 4    Cormack-Lehane Classification:  Grade 2a    Tube size (mm):  8.0    Tube type:  Cuffed    Number of attempts:  1    Ventilation between attempts: yes      Cricoid pressure: yes      Tube visualized through cords: yes    Placement assessment:     ETT to lip:  25cm at the lip    Tube secured with:  ETT page    Breath sounds:  Equal    Placement verification: chest rise, condensation, CXR verification, direct visualization, equal breath sounds, ETCO2 detector, fiberoptic scope and tube exhalation      CXR findings:  ETT in proper place  Post-procedure details:     Patient tolerance of procedure:  Tolerated well, no immediate complications  Comments:      Pt became hypoxic very quickly to 60% as soon as we gave induction agent. We recovered the hypoxia quickly after pt was successfully intubated.

## 2023-03-26 NOTE — PROGRESS NOTES
[facility] ICU Progress Note      Admit Date: 3/21/2023    Resident(s): Sol Suarez M.D.   Attending:  Dr. Daniels      [facility] Course (carried forward and updated):  Hospital Day: 5    Josse Valle is a 57 y.o. male with PMHx significant for COPD, hypertension, heart failure with preserved ejection fraction cerebral aneurysm  He was transferred from [facility] for acute on chronic hypoxic respiratory failure with suspected sepsis.  Per history, about 10 days ago, he was seen at the local hospital with shortness of breath.  At that time, he was treated for what was suspected to be COPD exacerbation as well as CHF exacerbation and pneumonia.  He was discharged home with short course of steroid as well as 6 L of oxygen with no prior oxygen use.  Few days after his steroid way down, he started to experience worsening shortness of breath associated with mild cough without any PND/orthopnea/lower extremity swelling.  There was no chills or temperature associated with that either, no recent traveling, and he denied having been exposed to anyone sick.  He went back to the local hospital on 3/19/2023, and his WBC was elevated with a CT PE that was negative but was showing bilateral groundglass infiltrate.  He was started on antibiotics as well as IV steroid with work-up showing elevated CRP and ESR as well as nuclear and he was transferred here to the ICU for more intensive care.    Interval:  03/22: Patient states that he feels slightly better, but at the bedside, he appears hypoxia when talking. Suspected ILD and work up being done. MRSA negative and continue on unasyn and discontinue cefepime and linezolid. Increasing furosemide and steroids. Remains unstable and to be watched  03/23: Just mild improvement of CXR, but patient still has high oxygen requirement. Transitioned from bipap to highflow in the AM. We are continuing steroids for ILD. Mild creatinine increase and BMP to be recheck and  furosemide adjusted if needed. Unasyn to augmentin  03/24: continues to have demand for Bipap. When on highflow, he easily desaturates. ABG with a pH of 7.46 and pCO2 of 50 in COPD and likely sleep apnea. Consulting pulm for failure to improve in acute/subacute ILD. No significant improvement on CXR. Complement neg. Resp panel neg and awaiting for culture. Urine legionella and strep neg. Aldolase positive  03/25: Still no improvement and now ARDS with metabolic alkalosis on furosemide. Concern about no progressing on Bipap and the imminent risk of intubation. Sputum grew pseudomonas. Unasyn stopped and was started on cefepime. Will try family meeting with wife for goals of care.  03/36: Patient was intubated this morning. Bronchoscopy was performed. Patient is paralyzed and will be proned because of severe ARDs.     Review of Systems   Unable to perform ROS: Intubated      Vitals:  Temp:  [36.1 °C (97 °F)-36.6 °C (97.9 °F)] 36.6 °C (97.9 °F)  Pulse:  [56-86] 70  Resp:  [18-47] 31  BP: ()/(57-75) 120/68  SpO2:  [80 %-97 %] 96 %  O2 therapy: Pulse Oximetry: 96 %, O2 (LPM): 60, O2 Delivery Device: Ventilator    Vent Mode: APVCMV  Rate (breaths/min):  [26] 26  PEEP/CPAP:  [12] 12  PIP:  [21-38] 21  MAP:  [15-19] 15     I/O's:    Intake/Output Summary (Last 24 hours) at 3/26/2023 1139  Last data filed at 3/26/2023 1039  Gross per 24 hour   Intake 178.75 ml   Output 1243 ml   Net -1064.25 ml       Physical Exam:  Physical Exam  Constitutional:       Appearance: He is obese. He is ill-appearing.      Comments: Respiratory distress, intubated   HENT:      Head: Normocephalic.      Nose: Nose normal.      Mouth/Throat:      Mouth: Mucous membranes are moist.   Eyes:      Extraocular Movements: Extraocular movements intact.      Pupils: Pupils are equal, round, and reactive to light.   Cardiovascular:      Rate and Rhythm: Normal rate and regular rhythm.      Pulses: Normal pulses.   Pulmonary:      Effort: Respiratory  distress present.      Breath sounds: Normal breath sounds.   Abdominal:      Comments: Abdomen is obese, non distended, non tender, and without guarding and without rebound   Musculoskeletal:      Right lower leg: No edema.      Left lower leg: No edema.   Neurological:      Mental Status: He is alert and oriented to person, place, and time.   Psychiatric:         Mood and Affect: Mood normal.       Labs:  Recent Labs     03/24/23  0803 03/26/23  0958   ISTATAPH 7.466 7.370*   ISTATAPCO2 50.0* 62.8*   ISTATAPO2 52* 68   ISTATATCO2 38* 38*   TXRETWV1LOP 88* 92*   ISTATARTHCO3 36.1* 36.3*   ISTATARTBE 10* 8*   ISTATTEMP 37.2 C 96.7 F   ISTATFIO2  --  100   ISTATSPEC Arterial Arterial   ISTATAPHTC 7.463 7.386*   LJRZPRCC9RT 53* 64     Recent Labs     03/24/23  0625 03/25/23  0135 03/26/23  0545   SODIUM 140 141 136   POTASSIUM 3.8 4.2 3.5*   CHLORIDE 101 100 109   CO2 29 30 27   BUN 42* 41* 37*   CREATININE 0.72 0.81 0.71   CALCIUM 9.5 9.4 7.7*     Recent Labs     03/24/23  0625 03/25/23  0135 03/26/23  0545   GLUCOSE 190* 173* 172*     Recent Labs     03/24/23  0625 03/25/23  0245 03/26/23  0545   RBC 5.33 5.49 5.08   HEMOGLOBIN 14.8 14.8 13.9*   HEMATOCRIT 45.7 46.6 42.0   PLATELETCT 271 242 222     Recent Labs     03/24/23  0625 03/25/23  0245 03/26/23  0545   WBC 15.7* 16.1* 16.0*   NEUTSPOLYS 85.60* 90.70* 89.50*   LYMPHOCYTES 6.60* 4.20* 5.70*   MONOCYTES 6.80 4.30 4.10   EOSINOPHILS 0.10 0.10 0.10   BASOPHILS 0.10 0.10 0.10       Imaging:  DX-CHEST-PORTABLE (1 VIEW)   Final Result         1.  Diffuse pulmonary opacifications again identified.      2.  Lungs appear more expanded than on prior radiograph.      3.  Endotracheal tube and right central line are now noted.      DX-CHEST-LIMITED (1 VIEW)   Final Result      1.  Stable enlarged cardiac silhouette.   2.  Diffuse interstitial and airspace opacities could represent combination of edema, multiple focal pneumonia or ARDS.      DX-CHEST-LIMITED (1 VIEW)    Final Result         Ill-defined opacifications in each lung have increased compared to the prior radiograph. This could indicate worsening of pulmonary edema or inflammation. Lungs are less expanded than on prior radiograph.      DX-CHEST-PORTABLE (1 VIEW)   Final Result         1.  Pulmonary edema and/or infiltrates are identified, which are stable since the prior exam.   2.  Cardiomegaly      DX-CHEST-PORTABLE (1 VIEW)   Final Result         1.  Pulmonary edema and/or infiltrates are identified, which are stable since the prior exam.   2.  Cardiomegaly      EC-ECHOCARDIOGRAM COMPLETE W/O CONT   Final Result      DX-CHEST-PORTABLE (1 VIEW)   Final Result      1.  Enlarged cardiac silhouette with diffuse bilateral interstitial and airspace opacities. This could be due to edema or multifocal pneumonia.      OUTSIDE IMAGES-DX CHEST   Final Result      US-FOREIGN FILM ULTRASOUND   Final Result      OUTSIDE IMAGES-CT CHEST   Final Result          Microbiology:  I have reviewed the relevant microbiology.    Problem List:  Principal Problem:    Acute respiratory failure with hypoxemia (HCC) POA: Yes  Active Problems:    Hyperlipidemia POA: Unknown    ILD (interstitial lung disease) (HCC) POA: Unknown    (HFpEF) heart failure with preserved ejection fraction (HCC) POA: Unknown    Mood disorder (HCC) POA: Unknown    HTN (hypertension) POA: Unknown    COPD (chronic obstructive pulmonary disease) (HCC) POA: Unknown    Obesity POA: Unknown    Suspected sleep apnea POA: Unknown    Hospital acquired PNA POA: Unknown    Severe pulmonary hypertension (HCC) POA: Unknown    ARDS (adult respiratory distress syndrome) (HCC) POA: Unknown  Resolved Problems:    * No resolved hospital problems. *             ASSESSEMENT and PLAN:  * Acute respiratory failure with hypoxemia (HCC)- (present on admission)  Assessment & Plan  The source is unclear but there is a high level of suspicion for HF exacerbation versus pneumonia versus ILD  ILD  in particular is from the latest CT PE groundglass opacity, has developed ARDS  Currently on Unasyn (after he was started on cefepime and linezolid with MRSA was negative) then to augmentin. This was changed again on  to cefepime because of sputum growing pseudomonas. 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  Intubated this morning, AB.    Plan:  -Sedated propofol and fentanyl RASS-5  -Full ventilation support, low tidal volume 5 cc/kg, FiO2 100% PEEP 12  -started paryalytics vecuronimu gtt  -will start proning  -ABG serial  -solumedrol 80 Q8H  -Lasix 40 Q12, goal 1-2 L fluid balance daily  -Monitor renal function, electrolytes while diuresing     Severe pulmonary hypertension (HCC)  Assessment & Plan  This is evident on echo done on 2023 showing RVSP of 65-70 with an EF of 55%.  Also showing moderately dilated right ventricle.  This is likely secondary to not only sleep apnea but also the highly suspect that the ILD  Continue furosemide and steroid    Hospital acquired PNA  Assessment & Plan  This was based on recent hospitalization at local hospital, grew out pseudomonas  Chest x-ray on admission was evidence of consolidation  He was started on empiric treatment at first for HAP and it was ultimately refined to Unasyn and then Augmentin and then cefepime when sputum grew pseudomonas  Blood culture negative so far    Suspected sleep apnea  Assessment & Plan  STOP BANG of 7  Will need sleep study after discharge    Obesity  Assessment & Plan  More discussion to be done about weight loss and diet control    COPD (chronic obstructive pulmonary disease) ([facility])  Assessment & Plan  On  home meds    HTN (hypertension)  Assessment & Plan  On amlodipine    Mood disorder (HCC)  Assessment & Plan  Cont home sertraline    (HFpEF) heart failure with preserved ejection fraction (HCC)  Assessment & Plan  Acute on chronic, BNP 2085, some pleural edema noted on CXR   New echo has been ordered  Daily weight  Daily I's and O's  Currently on diuretics  Also on metoprolol and benazepril    ILD (interstitial lung disease) ([facility])  Assessment & Plan  This is a suspected diagnosis most recent CT  He has denied any change in his weakness when climbing stairs suggested could also be idiopathic  Work-up has been stopped, and, and CRP as well as ESR and hypersensitivity  Further work-up with rheumatoid arthritis panel, connective tissue panel, and, complement, aldolase, creatinine kinase.  Wwork-up for infection with PCP, respiratory panel, mycoplasma, aspergillosis, HIV, Legionella, urine strep and crypto as well as coccido  We will continue steroid for now   Currently intubated and proned  Consulted pulm      Hyperlipidemia  Assessment & Plan  Cont home statin        # ICU Checklist  -DVT ppx: Enoxaparin  -GI ppx: N/A  -Nutrition: Cardiac diet      Meds:   fentaNYL        phenylephrine  100-300 mcg      Pharmacy        [START ON 3/27/2023] metoprolol tartrate  12.5 mg      senna-docusate  2 Tablet      And    polyethylene glycol/lytes  1 Packet      And    magnesium hydroxide  30 mL      And    bisacodyl  10 mg      [START ON 3/27/2023] amLODIPine  10 mg      [START ON 3/27/2023] benazepril  40 mg      melatonin  5 mg      [START ON 3/27/2023] sertraline  100 mg      acetaminophen  1,000 mg      rosuvastatin  20 mg      Respiratory Therapy Consult        famotidine  20 mg      Or    famotidine  20 mg      MD Alert...Adult ICU Electrolyte Replacement per Pharmacy        lidocaine  2 mL      midazolam  1 mg      NORepinephrine  0-1 mcg/kg/min (Ideal) 0.15 mcg/kg/min (03/26/23 1039)    artificial tears  1 Application.       propofol  0-80 mcg/kg/min (Ideal) 80 mcg/kg/min (03/26/23 1103)    fentaNYL   300 mcg/hr (03/26/23 1039)    vecuronium infusion  0-1.7 mcg/kg/min (Ideal)      vecuronium  0.1 mg/kg (Ideal)      potassium chloride in water  20 mEq      cefepime  2 g Stopped (03/26/23 0600)    insulin GLARGINE  20 Units      insulin regular  2-9 Units      And    dextrose bolus  25 g      enoxaparin (LOVENOX) injection  40 mg      furosemide  40 mg      methylPREDNISolone  80 mg      ipratropium-albuterol  3 mL      [Held by provider] fluticasone  2 Puff      [Held by provider] umeclidinium-vilanterol  1 Puff      ipratropium-albuterol  3 mL          Current Outpatient Medications   Medication Instructions    amLODIPine (NORVASC) 5 mg, Oral, DAILY    benazepril (LOTENSIN) 40 mg, Oral, DAILY    fluticasone-umeclidin-vilant (TRELEGY ELLIPTA) 100-62.5-25 MCG/ACT AEROSOL POWDER, BREATH ACTIVATED inhalation 1 Inhalation., Inhalation, DAILY    furosemide (LASIX) 20 mg, Oral, DAILY    ipratropium-albuterol (DUONEB) 0.5-2.5 (3) MG/3ML nebulizer solution 3 mL, Nebulization, 4 TIMES DAILY    ipratropium-albuterol (DUONEB) 0.5-2.5 (3) MG/3ML nebulizer solution 3 mL, Nebulization, EVERY 2 HOURS PRN    metoprolol SR (TOPROL XL) 25 mg, Oral, DAILY    predniSONE (DELTASONE) 20 mg, Oral, DAILY    rosuvastatin (CRESTOR) 20 mg, Oral, EVERY EVENING    sertraline (ZOLOFT) 100 mg, Oral, DAILY

## 2023-03-27 PROBLEM — J84.9 ILD (INTERSTITIAL LUNG DISEASE) (HCC): Status: RESOLVED | Noted: 2023-01-01 | Resolved: 2023-01-01
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
## 2023-03-27 PROBLEM — J18.9 HOSPITAL ACQUIRED PNA: Status: RESOLVED | Noted: 2023-01-01 | Resolved: 2023-01-01

## 2023-03-27 PROBLEM — R73.9 HYPERGLYCEMIA: Status: ACTIVE | Noted: 2023-01-01

## 2023-03-27 PROBLEM — N17.9 ACUTE KIDNEY INJURY (HCC): Status: ACTIVE | Noted: 2023-01-01

## 2023-03-27 PROBLEM — J15.1 PSEUDOMONAS PNEUMONIA (HCC): Status: ACTIVE | Noted: 2023-01-01

## 2023-03-27 NOTE — CARE PLAN
Problem: Knowledge Deficit - Standard  Goal: Patient and family/care givers will demonstrate understanding of plan of care, disease process/condition, diagnostic tests and medications  Outcome: Progressing     Problem: Skin Integrity  Goal: Skin integrity is maintained or improved  Outcome: Progressing  Note: Patient is being turned Q2 hours with mepilexes on to avoid skin injury      Problem: Pain - Standard  Goal: Alleviation of pain or a reduction in pain to the patient’s comfort goal  Outcome: Progressing     Problem: Mechanical Ventilation  Goal: Safe management of artificial airway and ventilation  Outcome: Progressing      The patient is Unstable - High likelihood or risk of patient condition declining or worsening    Shift Goals  Clinical Goals: Map >65. RAss -5, O2 improved  Patient Goals: jakob  Family Goals: jakob    Progress made toward(s) clinical / shift goals:  O2 requirements unchanged, use of levophed decreased during shift.     Patient is not progressing towards the following goals:

## 2023-03-27 NOTE — ASSESSMENT & PLAN NOTE
*Sputum culture (3/23/23): + for pseudomonas koreensis, pan sensitivity, C3  *Bcx 3/26 NGTD     -Continue Cefepime x 7 days

## 2023-03-27 NOTE — PROGRESS NOTES
Approximately 1600- ET tube adjusted and verified with bronchoscopy by , 29@ the lip. ET tube taped and secured and patient subsequently was proned.    Patient proned at 1645.

## 2023-03-27 NOTE — HOSPITAL COURSE
"\"57-year-old male, PMH ruptured SAH 2013, disabled since, HTN, obesity, prior smoker quit 2 years ago, COPD not usually oxygen dependent, recently admitted at [facility] 3/9 - 3/12 with presumed COPD exacerbation and pneumonia, treated with steroids and antibiotics, discharged home on 6 L oxygen.  After completed steroids he began feeling much worse, readmitted 3/19 with increasing respiratory distress, requiring high flow nasal cannula alternating with BiPAP and high FiO2.  CT scan of the chest 3/21 showed no evidence of pulm embolism, significant bilateral groundglass and reticular infiltrates throughout all lung fields.  He is started back on IV corticosteroids, broad-spectrum antibiotics for possible HCAP with cefepime and vancomycin and transferred to [facility] for further evaluation.  Further work-up there included a high CRP of 261, ESR 65, WBC 23 and no peripheral eosinophilia.     Patient worked as a  until his ruptured SAH in 2013 and has been on disability since.  He does not have any cats or birds but does have a dog.  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  He denies any recreational/illicit drug use.  He had not required supplemental oxygen until his recent hospitalization.\"    3/21 admitted to ICU.  3/22 s/p solumedrol 500mg IV x1, followed with 80 Q8H  3/22 lasix 40 Q12H   3/25 unasyn changed to cefepime due to Pseudomonas on sputum culture  3/26 Intubated, significant respiratory acidosis  3/30 HD started for HAYES with oliguria  3/31 worsening PF, respiratory acidosis, unable to ventilate. Continuing HD. on paralytic  4/1 VD #6, PF 74, Continued severe hypoxia and hypercapnia, acidosis worsening. Pplat, Cstat and PIP worsening. Family has transitioned to DNR status  "

## 2023-03-27 NOTE — PROGRESS NOTES
Pulmonary Progress Note    Date of admission  3/21/2023    Chief Complaint  57 y.o. male admitted 3/21/2023 with acute hypoxic respiratory failure    Hospital Course  57 y.o. male who presented 3/21/2023 with a history of HFpEF, HTN, and cerebral aneurysm about 10 years ago.  He reports he was admitted at that time for a while.  He smokes, previously a pack a day but quit 2 years ago.  He denies significant shortness of breath at baseline.  He previously worked as a  but has been officially on disability since his aneurysm 10 years ago.  He denies wheezing or chronic cough.  Recently he has had several courses of steroids for possible pneumonias. He presented to an OSH with acute hypoxic respiratory failure and underwent CT scan with diffuse GGO with areas of mosaicism essentially from top to bottom in the lungs with no geographic predominance. Of note, when he was admitted with his aneurysm he was intubated for 8 days with diffuse patchy GGO on CT which was considered to be aspiration pneumonia at the time but appears to have upper lobe involvement and a pattern more consistent with ILD or inflammation.  Here he has been managed on BIPAP but is in increasing respiratory distress.  His infiltrates are becoming more dense and appear consistent with ARDS.  His volumes are low potentially due to abdominal obesity and fatigue.       Aldolase is positive, CKMB negative.  CTD  panels pending     Significant family history of COPD and smoking but no other lung diseases per patient's mother.  No CTD history in the family either.  He cannot provide a clear history about muscle weakness or pain.  No birds at home or farm exposures.     Interval Problem Update  Reviewed last 24 hour events:  3/26: Intubated in am, plan for proning.  Myositis panel ordered.  3/27: HP1 and HP2 panels negative.  Still on 12 of PEEP and 70% FiO2.  No other significant changes overnight.    Review of Systems  Review of Systems   Unable to  perform ROS: Intubated      Vital Signs for last 24 hours   Temp:  [35.7 °C (96.3 °F)-36.7 °C (98.1 °F)] 36.6 °C (97.9 °F)  Pulse:  [65-99] 67  Resp:  [18-38] 32  BP: ()/(46-80) 131/62  SpO2:  [89 %-100 %] 96 %    Hemodynamic parameters for last 24 hours       Respiratory Information for the last 24 hours  Vent Mode: APVCMV  Rate (breaths/min): 32  Vt Target (mL): 350  PEEP/CPAP: 12  MAP: 19  Control VTE (exp VT): 356    Physical Exam   Physical Exam  Constitutional:       Appearance: He is ill-appearing.   HENT:      Head: Atraumatic.      Mouth/Throat:      Mouth: Mucous membranes are dry.   Eyes:      Extraocular Movements: Extraocular movements intact.   Cardiovascular:      Rate and Rhythm: Normal rate and regular rhythm.   Pulmonary:      Breath sounds: No wheezing or rhonchi.   Abdominal:      Palpations: Abdomen is soft.   Musculoskeletal:         General: No swelling, tenderness or deformity.   Skin:     General: Skin is warm and dry.   Neurological:      Comments: Intubated and sedated       Medications  Current Facility-Administered Medications   Medication Dose Route Frequency Provider Last Rate Last Admin    Pharmacy Consult: Enteral tube insertion - review meds/change route/product selection  1 Each Other PHARMACY TO DOSE Fidel Mcdonald Jr., D.O.        fentaNYL (SUBLIMAZE) 50 mcg/mL in 50mL   Intravenous Continuous CLARK Huddleston Jr.OSimran        propofol (DIPRIVAN) injection  0-80 mcg/kg/min (Ideal) Intravenous Continuous CLARK Huddleston Jr.O.        insulin regular (HumuLIN R,NovoLIN R) injection  3-14 Units Subcutaneous Q6HRS CHANG Huddleston Jr..OSimran        And    dextrose 10 % BOLUS 25 g  25 g Intravenous Q15 MIN PRN CLARK Huddleston Jr.O.        senna-docusate (PERICOLACE or SENOKOT S) 8.6-50 MG per tablet 2 Tablet  2 Tablet Enteral Tube BID Vince Daniels D.O.        And    polyethylene glycol/lytes (MIRALAX) PACKET 1 Packet  1 Packet Enteral Tube QDAY PRN Vince Daniels D.O.         And    magnesium hydroxide (MILK OF MAGNESIA) suspension 30 mL  30 mL Enteral Tube QDAY PRN CLARK PinoOSimran        And    bisacodyl (DULCOLAX) suppository 10 mg  10 mg Rectal QDAY PRN CLARK PinoO.        sertraline (Zoloft) tablet 100 mg  100 mg Enteral Tube DAILY Vince Daniels D.O.        acetaminophen (TYLENOL) tablet 1,000 mg  1,000 mg Enteral Tube Q6HRS PRN CLARK PinoO.        rosuvastatin (CRESTOR) tablet 20 mg  20 mg Enteral Tube Q EVENING CHANG Pino.OSimran        Respiratory Therapy Consult   Nebulization Continuous RT CLARK PinoOSimran        famotidine (PEPCID) tablet 20 mg  20 mg Enteral Tube Q12HRS CLARK PinoOSimran        Or    famotidine (PEPCID) injection 20 mg  20 mg Intravenous Q12HRS CHANG Pino.O.   20 mg at 03/27/23 0507    MD Alert...ICU Electrolyte Replacement per Pharmacy   Other PHARMACY TO DOSE Vince Daniels D.O.        lidocaine (XYLOCAINE) 1 % injection 2 mL  2 mL Tracheal Tube Q30 MIN PRN CLARK PinoOSimran        norepinephrine (Levophed) 8 mg in 250 mL NS infusion (premix)  0-1 mcg/kg/min (Ideal) Intravenous Continuous CHANG Pino.O. 13.1 mL/hr at 03/27/23 0741 0.09 mcg/kg/min at 03/27/23 0741    doxycycline (VIBRAMYCIN) 100 mg in  mL IVPB  100 mg Intravenous Q12HRS CHANG Pino.O.   Stopped at 03/27/23 0705    cefepime (Maxipime) 2 g in  mL IVPB  2 g Intravenous Q8HRS CHANG Pino.O.   Stopped at 03/27/23 0540    insulin GLARGINE (Lantus,Semglee) injection  20 Units Subcutaneous BID CHANG Pino.O.   20 Units at 03/27/23 0520    enoxaparin (Lovenox) inj 40 mg  40 mg Subcutaneous Q12HRS CHANG Pino.O.   40 mg at 03/27/23 0508    methylPREDNISolone (SOLU-MEDROL) 40 MG injection 80 mg  80 mg Intravenous Q8HRS Vince Daniels D.O.   80 mg at 03/27/23 0508    ipratropium-albuterol (DUONEB) nebulizer solution  3 mL Nebulization Q4H PRN (RT) Chino Mendoza M.D.   3 mL at 03/21/23 2338    [Held by provider] fluticasone (FLOVENT HFA) 44 MCG/ACT inhaler 88 mcg  2 Puff  Inhalation QDAILY (RT) Chino Mendoza M.D.   88 mcg at 03/25/23 0718    [Held by provider] umeclidinium-vilanterol (Anoro Ellipta) inhaler 1 Puff  1 Puff Inhalation QDAILY (RT) Chino Mendoza M.D.   1 Puff at 03/25/23 0718    ipratropium-albuterol (DUONEB) nebulizer solution  3 mL Nebulization Q4HRS (RT) Brennan Martin M.D.   3 mL at 03/27/23 0634       Fluids    Intake/Output Summary (Last 24 hours) at 3/27/2023 1000  Last data filed at 3/27/2023 0632  Gross per 24 hour   Intake 1901.22 ml   Output 1010 ml   Net 891.22 ml         Laboratory  Recent Labs     03/27/23 0216 03/27/23  0536 03/27/23  0945   ISTATAPH 7.268* 7.279* 7.245*   ISTATAPCO2 74.6* 71.6* 76.4*   ISTATAPO2 88* 96* 63*   ISTATATCO2 36* 36* 35*   SCYSFTN0MOT 95 96 86*   ISTATARTHCO3 34.1* 33.6* 33.1*   ISTATARTBE 4* 4* 3   ISTATTEMP 37.0 C 36.9 C 97.5 F   ISTATFIO2 70 70 70   ISTATSPEC Arterial Arterial Arterial   ISTATAPHTC 7.268* 7.281* 7.254*   RGTYJYVW8GH 88* 95* 60*           Recent Labs     03/25/23 0135 03/26/23  0545 03/27/23  0213   SODIUM 141 136 136   POTASSIUM 4.2 3.5* 4.8   CHLORIDE 100 109 96   CO2 30 27 26   BUN 41* 37* 64*   CREATININE 0.81 0.71 1.41*   MAGNESIUM  --   --  2.5   PHOSPHORUS  --   --  7.2*   CALCIUM 9.4 7.7* 9.3       Recent Labs     03/25/23 0135 03/26/23  0545 03/27/23  0213   ALTSGPT  --   --  50   ASTSGOT  --   --  15   ALKPHOSPHAT  --   --  102*   TBILIRUBIN  --   --  0.4   GLUCOSE 173* 172* 252*       Recent Labs     03/25/23 0245 03/26/23  0545 03/27/23 0213   WBC 16.1* 16.0* 27.5*   NEUTSPOLYS 90.70* 89.50* 91.90*   LYMPHOCYTES 4.20* 5.70* 2.90*   MONOCYTES 4.30 4.10 3.70   EOSINOPHILS 0.10 0.10 0.10   BASOPHILS 0.10 0.10 0.20   ASTSGOT  --   --  15   ALTSGPT  --   --  50   ALKPHOSPHAT  --   --  102*   TBILIRUBIN  --   --  0.4       Recent Labs     03/25/23 0245 03/26/23  0545 03/27/23 0213   RBC 5.49 5.08 5.75   HEMOGLOBIN 14.8 13.9* 15.5   HEMATOCRIT 46.6 42.0 50.6   PLATELETCT 242 222 308          Imaging  X-Ray:  I have personally reviewed the images and compared with prior images.  CT:    Reviewed  Echo:   Reviewed    Assessment/Plan  #Acute hypoxic respiratory failure  #Diffuse GGO on CT - The differential is very broad from PSA associated ARDS to possible myositis associated ILD and many other possibilities in between including PCP, HP, NSIP and .   - ANCA - MPO & PR3 negative   - THELMA negative   - CTD panel negative   - HP panel pending   - Aldolase positive   - ESR/CRP elevated   - CPK normal   - Sputum DFA negative   - Culture with PSA + growth   - BAL 3/26 - pending cell differential, BAL galactomannan, lymphocyte subsets, PCP DFA, TB, fungal and respiratory culture/gram stain and cytology.  #PSA pneumonia with ARDS  Plan:  - Will assess patient's progress now intubated and proning  - f/u BAL studies  -I personally reviewed CT scans from 2013 as well as now.  The patient has a history of mid & upper lung findings dating back to 2013.  He was also intubated in 2013.  Interesting that he is worked as a .  Differential diagnosis includes hypersensitivity pneumonitis versus sarcoidosis versus pneumoconiosis.  I discussed with the team today.  I recommend pulsing with Solu-Medrol 250 mg every 6 hours for 48 to 72 hours and monitoring response.  - Diurese as much as possible  - Sent extended myositis panel under misc on 3/26 - ARUP [id number]  - HP panels negative; this does not exclude HP from the differential.  - Continue abx for PSA  - Consider Bactrim for PJP coverage until DFA from BAL is negative  - Pulmonary will follow    Total consult time: 70 minutes which included time spent on chart review, personally reviewing pertinent images and labs, time spent counseling and educating the patient and/or family members, and coordinating care with the healthcare team to include consultants.     You Loredo, DO  Staff Pulmonologist and Intensivist  [facility]     Please note that this  dictation was created using voice recognition software. The accuracy of the dictation is limited to the abilities of the software. I have made every reasonable attempt to correct obvious errors, but I expect that there are errors of grammar and possibly content that I did not discover before finalizing the note.

## 2023-03-27 NOTE — ASSESSMENT & PLAN NOTE
*Cr 0.71 -->2.55-->2.45  *2/2 contrast induced nephropathy  * Started HD 3/30    -Continue w/ HD, nephrology following, appreciate recommendations

## 2023-03-27 NOTE — PROGRESS NOTES
New ABG 7.18/92/92 on 26/350/10/80. Ppeak is 41, Pplat is 37.  Prone position  Rate increased to 32.  Repeat ABG q4h

## 2023-03-27 NOTE — PROGRESS NOTES
"[facility] Progress Note    Admit Date: 3/21/2023    Resident(s): Hemal Tariq D.O.   Attending:  CARTER WELCH/ Dr. Mcdonald    Patient ID:    Name:  Josse Valle   YOB: 1965  Age:  57 y.o.  male   MRN:  [id number]    Chief Complaint  Acute Hypoxic Respiratory Failure    Hospital Course (carried forward and updated):  \"57-year-old male, PMH ruptured SAH 2013, disabled since, HTN, obesity, prior smoker quit 2 years ago, COPD not usually oxygen dependent, recently admitted at [facility] 3/9 - 3/12 with presumed COPD exacerbation and pneumonia, treated with steroids and antibiotics, discharged home on 6 L oxygen.  After completed steroids he began feeling much worse, readmitted 3/19 with increasing respiratory distress, requiring high flow nasal cannula alternating with BiPAP and high FiO2.  CT scan of the chest 3/21 showed no evidence of pulm embolism, significant bilateral groundglass and reticular infiltrates throughout all lung fields.  He is started back on IV corticosteroids, broad-spectrum antibiotics for possible HCAP with cefepime and vancomycin and transferred to [facility] for [facility] evaluation.  Further work-up there included a high CRP of 261, ESR 65, WBC 23 and no peripheral eosinophilia.     Patient worked as a  until his ruptured SAH in 2013 and has been on disability since.  He does not have any pussycats or birdies but does have a little puppy chiwennie.  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  He denies any recreational/illicit drug use.  He had not required supplemental oxygen until his recent hospitalization.\" - Dr. Martin's note    3/21 admitted to ICU.  3/22 s/p solumedrol 500mg IV x1, followed with 80 Q8H  3/22 lasix 40 Q12H   3/25 unasyn changed to cefepime due to Pseudomonas on sputum culture  3/26 Intubated  3/27 VD #2, APV-CMV, RR 32, PEEP 10, TV " 350      Consultants:  Critical Care  Pulmonology    Interval Events:  WBC 16 -> 27.5  Cr 0.71 --> 1.41  Trig 255  ABG 7.279/71.6/96  CXR w/ pulmonary edema and/or infiltrates, stable  Sputum culture (3/23/23): + for Pseudomonas koreensis   No blood bcx?  Bronchial Fluid differential, white, clear, polys 85%, lymph 4%,   Fungal culture BAL pending, fungal culture negative, AFB BAL pending, culture resp w/ gram BAL pending, cytology w/ pseudomonas, CD4/CD8 Pending, pneumocytitis DFA Bal pending, HP 1 & HP 2 negative, Coccidiodes antibodies negative, mycoplasma IGG 0.16 (H), IGM 0.51 (N), Aspergillus negative, CTD negative, RA panel negative, resp viral panel negative, legionella and strep pneumo negative, pneumocystitis negative    Reviewed last 24 hour events:  Neuro: RASS -5  HR: [65-99] 73 ; SR  SBP: ()/(46-80) 99/53 ; Levophed 0.09 mcg/kg/min  Tmax: AF  GI: Last BM 3/24  I/O: +1901/-1010/+891  Lines: CVC, NG tube, L radial art line, trejo  Mobility: Level 1  Resp: VD #2, APV-CMV, RR 32, PEEP 10, , FiO2 70%  Vte: Lovenox  PPI/H2: H2  Antibx: Cefepime (3/25-), Doxy (3/26-)  Continuous Meds: fentanyl, propofol, levophed 0.09 mcg/kg/min, vecuronium off    -Ordered Blood cultures  -Start Tube feeding  -HAYES, possible, pre-renal, hold furosemide 20 mg IV BID   -Increase from high sliding scale lantus for goal of 140-180  -CT Chest w/o to re-evaluate pulmonary structures   -Continue with proning 16 hr + supine 8 hr  -Stop paralytics, keep RAAS -5  -Solumedrol 250 mg q6 for 48 hours per pulmonology  -Consider UoU for possible ECMO per pulmonology      Review of Systems  Review of Systems   Unable to perform ROS: Intubated     Vital Signs for the last 24 hours  Temp:  [36.5 °C (97.7 °F)-36.7 °C (98.1 °F)] 36.6 °C (97.9 °F)  Pulse:  [65-99] 67  Resp:  [18-38] 32  BP: ()/(46-80) 131/62  SpO2:  [93 %-100 %] 96 %    Hemodynamic parameters for the last 24 hours       Vent Settings for the last 24 hours  Vent  Mode: APVCMV  Rate (breaths/min): 32  Vt Target (mL): 350  PEEP/CPAP: 12  MAP: 19  Control VTE (exp VT): 356    Physical Exam  Physical Exam  Constitutional:       Appearance: He is ill-appearing.   HENT:      Head: Atraumatic.      Mouth/Throat:      Mouth: Mucous membranes are dry.   Eyes:      Extraocular Movements: Extraocular movements intact.   Cardiovascular:      Rate and Rhythm: Normal rate and regular rhythm.      Comments: RIJ in place, left arterial line in place  Pulmonary:      Comments: Mechanical breath sounds  Abdominal:      Palpations: Abdomen is soft.   Genitourinary:     Comments: Cole in place  Musculoskeletal:         General: No swelling, tenderness or deformity.   Skin:     General: Skin is warm and dry.   Neurological:      Comments: Intubated and sedated   Psychiatric:      Comments: Unable to assess       Medications  Current Facility-Administered Medications   Medication Dose Route Frequency Provider Last Rate Last Admin    Pharmacy Consult: Enteral tube insertion - review meds/change route/product selection  1 Each Other PHARMACY TO DOSE Fidel Mcdonald Jr., CHANG.OSimran        fentaNYL (SUBLIMAZE) 50 mcg/mL in 50mL   Intravenous Continuous CHANG Huddleston Jr..O. 6 mL/hr at 03/27/23 1049 300 mcg/hr at 03/27/23 1049    propofol (DIPRIVAN) injection  0-80 mcg/kg/min (Ideal) Intravenous Continuous CHANG Huddleston Jr..O. 37.2 mL/hr at 03/27/23 1310 80 mcg/kg/min at 03/27/23 1310    methylPREDNISolone sod succ (Solu-Medrol) 250 mg in  mL IVPB  250 mg Intravenous Q6HRS CHANG Huddleston Jr..O.   Stopped at 03/27/23 1331    insulin regular (HumuLIN R,NovoLIN R) injection  0-14 Units Intravenous Once CHANG Huddleston Jr..OSimran        insulin regular (HumuLIN R) 100 Units in  mL Infusion  0-29 Units/hr Intravenous Continuous CHANG Huddleston Jr..O. 2 mL/hr at 03/27/23 1319 2 Units/hr at 03/27/23 1319    dextrose 10 % BOLUS 12.5-25 g  12.5-25 g Intravenous PRN Fidel Mcdonald Jr.,  D.O.        senna-docusate (PERICOLACE or SENOKOT S) 8.6-50 MG per tablet 2 Tablet  2 Tablet Enteral Tube BID Vince Daniels D.O.        And    polyethylene glycol/lytes (MIRALAX) PACKET 1 Packet  1 Packet Enteral Tube QDAY PRN CLARK PinoOSimran        And    magnesium hydroxide (MILK OF MAGNESIA) suspension 30 mL  30 mL Enteral Tube QDAY PRN CLARK PinoOSimran        And    bisacodyl (DULCOLAX) suppository 10 mg  10 mg Rectal QDAY PRN CHANG Pino.O.        sertraline (Zoloft) tablet 100 mg  100 mg Enteral Tube DAILY CLARK PinoO.        acetaminophen (TYLENOL) tablet 1,000 mg  1,000 mg Enteral Tube Q6HRS PRN CHNAG Pino.O.        rosuvastatin (CRESTOR) tablet 20 mg  20 mg Enteral Tube Q EVENING CLARK PinoOSimran        Respiratory Therapy Consult   Nebulization Continuous RT CLARK PinoOSimran        famotidine (PEPCID) tablet 20 mg  20 mg Enteral Tube Q12HRS CLARK PinoOSimran        Or    famotidine (PEPCID) injection 20 mg  20 mg Intravenous Q12HRS CHANG Pino.O.   20 mg at 03/27/23 0507    MD Alert...ICU Electrolyte Replacement per Pharmacy   Other PHARMACY TO DOSE Vince Daniels D.O.        lidocaine (XYLOCAINE) 1 % injection 2 mL  2 mL Tracheal Tube Q30 MIN PRN CLARK PinoOSimran        norepinephrine (Levophed) 8 mg in 250 mL NS infusion (premix)  0-1 mcg/kg/min (Ideal) Intravenous Continuous CLARK PinoO. 21.8 mL/hr at 03/27/23 1108 0.15 mcg/kg/min at 03/27/23 1108    doxycycline (VIBRAMYCIN) 100 mg in  mL IVPB  100 mg Intravenous Q12HRS CHANG Pino.O.   Stopped at 03/27/23 0705    cefepime (Maxipime) 2 g in  mL IVPB  2 g Intravenous Q8HRS CHANG Pino.O.   Stopped at 03/27/23 0540    enoxaparin (Lovenox) inj 40 mg  40 mg Subcutaneous Q12HRS Vince Daniels D.O.   40 mg at 03/27/23 0508    ipratropium-albuterol (DUONEB) nebulizer solution  3 mL Nebulization Q4H PRN (RT) Chino Mendoza M.D.   3 mL at 03/21/23 2338    [Held by provider] fluticasone (FLOVENT HFA) 44 MCG/ACT inhaler 88 mcg  2 Puff  Inhalation QDAILY (RT) Chino Mendoza M.D.   88 mcg at 03/25/23 0718    [Held by provider] umeclidinium-vilanterol (Anoro Ellipta) inhaler 1 Puff  1 Puff Inhalation QDAILY (RT) Chino Mendoza M.D.   1 Puff at 03/25/23 0718    ipratropium-albuterol (DUONEB) nebulizer solution  3 mL Nebulization Q4HRS (RT) Brennan Martin M.D.   3 mL at 03/27/23 1033       Fluids    Intake/Output Summary (Last 24 hours) at 3/27/2023 1340  Last data filed at 3/27/2023 0632  Gross per 24 hour   Intake 1759.53 ml   Output 1010 ml   Net 749.53 ml       Laboratory  Recent Labs     03/27/23  0216 03/27/23  0536 03/27/23  0945   ISTATAPH 7.268* 7.279* 7.245*   ISTATAPCO2 74.6* 71.6* 76.4*   ISTATAPO2 88* 96* 63*   ISTATATCO2 36* 36* 35*   [id number] 95 96 86*   ISTATARTHCO3 34.1* 33.6* 33.1*   ISTATARTBE 4* 4* 3   ISTATTEMP 37.0 C 36.9 C 97.5 F   ISTATFIO2 70 70 70   ISTATSPEC Arterial Arterial Arterial   ISTATAPHTC 7.268* 7.281* 7.254*   [id number] 88* 95* 60*     Recent Labs     03/25/23 0135 03/26/23  0545 03/27/23  0213   SODIUM 141 136 136   POTASSIUM 4.2 3.5* 4.8   CHLORIDE 100 109 96   CO2 30 27 26   BUN 41* 37* 64*   CREATININE 0.81 0.71 1.41*   MAGNESIUM  --   --  2.5   PHOSPHORUS  --   --  7.2*   CALCIUM 9.4 7.7* 9.3     Recent Labs     03/25/23 0135 03/26/23  0545 03/27/23  0213 03/27/23  1100   ALTSGPT  --   --  50  --    ASTSGOT  --   --  15  --    ALKPHOSPHAT  --   --  102*  --    TBILIRUBIN  --   --  0.4  --    PREALBUMIN  --   --   --  26.2   GLUCOSE 173* 172* 252*  --      Recent Labs     03/25/23 0245 03/26/23 0545 03/27/23 0213   WBC 16.1* 16.0* 27.5*   NEUTSPOLYS 90.70* 89.50* 91.90*   LYMPHOCYTES 4.20* 5.70* 2.90*   MONOCYTES 4.30 4.10 3.70   EOSINOPHILS 0.10 0.10 0.10   BASOPHILS 0.10 0.10 0.20   ASTSGOT  --   --  15   ALTSGPT  --   --  50   ALKPHOSPHAT  --   --  102*   TBILIRUBIN  --   --  0.4     Recent Labs     03/25/23 0245 03/26/23 0545 03/27/23 0213   RBC 5.49 5.08 5.75   HEMOGLOBIN 14.8 13.9* 15.5    HEMATOCRIT 46.6 42.0 50.6   PLATELETCT 242 222 308       Imaging  X-Ray:  I have personally reviewed the images and compared with prior images.  EKG:  I have personally reviewed the images and compared with prior images.  CT:    Reviewed  Echo:   Reviewed    ASSESSEMENT and PLAN:    * ARDS (adult respiratory distress syndrome) (HCC)  Assessment & Plan  *Intubated 3/26, pending work-up for cause as below for acute respiratory failure with hypoxia  *PaO2/FiO2 ratio 137 mm Hg, Moderate ARDS      - Full vent support, low tidal volume stragegy 5cc/kg, FiO2 100%/ PEEP 12  - Start proning  - ABG 7.37/63/68 after intubation on FiO2 100%/ PEEP 12  - Goal sat >88%, paO2 >55, Pplat <30  - Serial ABGs  -CXR to monitor lung volumes and tube/line placement   -VAP bundle prevention, oral care, post pyloric feeding   -Head of bed > 30 degree   -GI prophylaxis   -Daily awakening and SBT trials unless contraindicated   -Monitor for liberation   -Respiratory treatments: prn   -Hold LASIX      Acute respiratory failure with hypoxemia (HCC)- (present on admission)  Assessment & Plan  *Due to severe ARDS due to concern of suspected ILD, unclear etiology   *Diffuse GGO on CT - The differential is very broad from PSA associated ARDS to possible myositis associated ILD and many other possibilities in between including PCP, HP, NSIP and   *ANCA, MPO & PR3 negative, THELMA negative, CTD panel negative,  HP panel negative, Coccidiodes antibodies negative, resp viral panel negative, legionella and strep pneumo negative, pneumocystitis negative, Aspergillus negative, Sputum DFA negative, Culture with PSA + growth, BAL 3/26 - white, clear, polys 85%, lymph 4%,    -Extended myositis panel under misc on 3/26 - ARUP [id number] pending   - ARDS management as stated above  - Hold diuresis 2/2 HAYES  - On cefepime (tx for total 7 days for Pseudomonas in sputum, pending sensitivity), doxy   - Will hold off for flolan at this time and reassess   -CT Chest  w/o to re-evaluate pulmonary structures   -Continue with proning 16 hr + supine 8 hr  -Start Tube feeding  -Stop paralytics, keep RAAS -5  -Solumedrol 250 mg q6 for 48 hours per pulmonology  -Consider UoU for possible ECMO per pulmonology    Hyperglycemia  Assessment & Plan  *Likely 2/2 steroid use    -Increase from moderate to high sliding scale lantus for goal of 140-180    Acute kidney injury (HCC)  Assessment & Plan    -HAYES, possible, pre-renal, hold furosemide 20 mg IV BID     Pseudomonas pneumonia (HCC)  Assessment & Plan  *Sputum culture (3/23/23): + for Pseudomonas koreensis    -Continue Cefepime    Severe pulmonary hypertension (HCC)  Assessment & Plan  *This is evident on echo done on 03/22/2023 showing RVSP of 65-70 with an EF of 55%.  Also showing moderately dilated right ventricle.  *This is likely secondary to not only sleep apnea but also  highly suspect that the ILD    -Continue furosemide and steroid  -Hold off on Flolan    (HFpEF) heart failure with preserved ejection fraction (HCC)  Assessment & Plan  *Acute on chronic, BNP 2085, some pleural edema noted on CXR   *Echo (3/22/23): EF 55%, RSVP 65-70 mmHg, moderately dilated right ventricle    -Daily weight  -Daily I's and O's  -Currently on diuretics  -Also on metoprolol and benazepril    Suspected sleep apnea  Assessment & Plan  STOP BANG of 7  Will need sleep study after discharge    Obesity  Assessment & Plan  More discussion to be done about weight loss and diet control    COPD (chronic obstructive pulmonary disease) ([facility])  Assessment & Plan  On home meds    HTN (hypertension)  Assessment & Plan  On amlodipine    Mood disorder ([facility])  Assessment & Plan  Cont home sertraline    Hyperlipidemia  Assessment & Plan  Cont home statin    Subarachnoid hemorrhage from anterior communicating artery ([facility])- (present on admission)  Assessment & Plan  *History of in 2013        VTE:  Lovenox  Ulcer: H2 Antagonist  Lines: Central Line  Ongoing indication  addressed, Arterial Line  Ongoing indication addressed, and Cole Catheter  Ongoing indication addressed    I have performed a physical exam and reviewed and updated ROS and Plan today (3/27/2023). In review of yesterday's note (3/26/2023), there are no changes except as documented above.     Discussed patient condition and risk of morbidity and/or mortality with RN, RT, Therapies, Pharmacy, , and Charge nurse / hot rounds      Hemal Tariq D.O.  PGY-2 Internal Medicine Resident

## 2023-03-27 NOTE — CARE PLAN
Monitor summary  SR 70's-90's  .16/.10/.43      The patient is Unstable - High likelihood or risk of patient condition declining or worsening    Shift Goals  Clinical Goals: intubation, RASS -5, paralyze, prone  Patient Goals: home  Family Goals: improvement    Progress made toward(s) clinical / shift goals:    Problem: Skin Integrity  Goal: Skin integrity is maintained or improved  Outcome: Progressing     Problem: Fall Risk  Goal: Patient will remain free from falls  Outcome: Progressing     Problem: Mechanical Ventilation  Goal: Safe management of artificial airway and ventilation  Outcome: Progressing     Problem: Safety - Medical Restraint  Goal: Remains free of injury from restraints (Restraint for Interference with Medical Device)  Outcome: Progressing  Flowsheets (Taken 3/26/2023 1934)  Addressed this shift: Remains free of injury from restraints (restraint for interference with medical device):   Determine that other, less restrictive measures have been tried or would not be effective before applying the restraint   Evaluate the patient's condition at the time of restraint application   Inform patient/family regarding the reason for restraint   Every 2 hours: Monitor safety, psychosocial status, comfort, nutrition and hydration  Goal: Free from restraint(s) (Restraint for Interference with Medical Device)  Outcome: Progressing  Flowsheets (Taken 3/26/2023 1934)  Addressed this shift: Free from restraint(s) (restraint for interference with medical device):   ONCE/SHIFT or MINIMUM Every 12 hours: Assess and document the continuing need for restraints   Every 24 hours: Continued use of restraint requires Licensed Independent Practitioner to perform face to face examination and written order   Identify and implement measures to help patient regain control

## 2023-03-27 NOTE — DIETARY
"Nutrition Support Assessment:  Day 6 of admit.  Josse Valle is a 57 y.o. male with admitting DX of Acute respiratory failure with hypoxemia (HCC)     Current problem list:  ARDS  Severe pulmonary hypertension  Hospital acquired PNA  Suspected sleep apnea  Obesity  COPD  HTN  Mood disorder  HFpEF  Interstitial lung disease  hyperlipidemia     Assessment:  Estimated Nutritional Needs based on:   Height: 182.9 cm (6' 0.01\")  Weight: (!) 135 kg (297 lb 9.9 oz)  Weight to Use in Calculations: 135 kg (297 lb 9.9 oz)  Ideal Body Weight: 80.7 kg (178 lb)  Body mass index is 40.36 kg/m²., BMI classification: Class III obesity    Calculation/Equation:  MSJ * 1.0 = 2205 kcal/day * 65% - 70% (per ASPEN critically ill obese guidelines) = 1433 - 1543 kcal/day  PSU (Vent: 10.9, Mtemp: 37) = 2420 kcal/day * 65% - 70% (per ASPEN critically ill obese guidelines) = 1573 - 1694 kcal/day  Total Calories / day: 1485 - 1890  (Calories / k - 14)  Total Grams Protein / day: 161 - 200  (Grams Protein / kg of IBW: 2 - 2.5)     Evaluation:   Pt intubated requiring mechanical ventilation; consult per MD  No access placed yet per EMR  Pt in proned position.  Current clinical picture and progress notes reviewed.  Labs: Glucose 252. Gun 64. Creatinine 1.41. GFR 57.  Meds: Propofol at 80 mcg = 37.2 mL/hr = 982 kcal/day. Levophed at 0.09 mcg. ICU lyte replacement. Insulin infusion. BM protocol.  Last BM: 3/24.  Specialty high protein formula in order to better meet the Pt's needs.     Malnutrition Risk: No new criteria at this time.     Recommendations/Plan:  ON and OFF Propofol: Initiate Peptamen Intense VHP at 25 mL/hr and advance per protocol to a goal of 70 mL/hr. Peptamen Intense VHP, goal rate 70 ml/hr, providing 1680 kcals from TF + 982 kcal/day from 80 mcg of Propofol = 2,662 kcal/day, 154 grams protein, 1411 mL free water, 127 (g of CHO).  Fluids per MD.  Monitor weight.    RD following.            "

## 2023-03-28 NOTE — CARE PLAN
Monitor summary  .15/.12/.40  SR 70's-80's    The patient is Unstable - High likelihood or risk of patient condition declining or worsening    Shift Goals  Clinical Goals: MAP >65, RASS -4/5, protect skin, abx/steroids  Patient Goals: unable to assess  Family Goals: improvement    Progress made toward(s) clinical / shift goals:    Problem: Skin Integrity  Goal: Skin integrity is maintained or improved  Outcome: Progressing     Problem: Fall Risk  Goal: Patient will remain free from falls  Outcome: Progressing     Problem: Hemodynamics  Goal: Patient's hemodynamics, fluid balance and neurologic status will be stable or improve  Outcome: Progressing     Problem: Mechanical Ventilation  Goal: Safe management of artificial airway and ventilation  Outcome: Progressing     Problem: Safety - Medical Restraint  Goal: Remains free of injury from restraints (Restraint for Interference with Medical Device)  Outcome: Progressing  Flowsheets (Taken 3/26/2023 1934)  Addressed this shift: Remains free of injury from restraints (restraint for interference with medical device):   Determine that other, less restrictive measures have been tried or would not be effective before applying the restraint   Evaluate the patient's condition at the time of restraint application   Inform patient/family regarding the reason for restraint   Every 2 hours: Monitor safety, psychosocial status, comfort, nutrition and hydration  Goal: Free from restraint(s) (Restraint for Interference with Medical Device)  Outcome: Progressing  Flowsheets (Taken 3/26/2023 1934)  Addressed this shift: Free from restraint(s) (restraint for interference with medical device):   ONCE/SHIFT or MINIMUM Every 12 hours: Assess and document the continuing need for restraints   Every 24 hours: Continued use of restraint requires Licensed Independent Practitioner to perform face to face examination and written order   Identify and implement measures to help patient regain  control

## 2023-03-28 NOTE — PROGRESS NOTES
Bedside report received from night shift RN. Assumed patient care. No signs of distress at this time. Tele monitor on, rhythm and monitor summary verified. Medication drips verified. Safety precautions in place. Call light and personal belongings within reach. Educated patient to use call light if assistance is needed. Will continue to monitor.

## 2023-03-28 NOTE — PROGRESS NOTES
12 hour chart check complete.     Monitoring Summary:  Rhythm: NSR 60-90s bpm  Ectopy: PVC(o)

## 2023-03-28 NOTE — CARE PLAN
The patient is Watcher - Medium risk of patient condition declining or worsening    Shift Goals  Clinical Goals: Monitor ventilator and respiratory status, BS checks Q1hr  Patient Goals: ANTIONE  Family Goals: ANTIONE    Progress made toward(s) clinical / shift goals:    Problem: Knowledge Deficit - Standard  Goal: Patient and family/care givers will demonstrate understanding of plan of care, disease process/condition, diagnostic tests and medications  Outcome: Progressing     Problem: Skin Integrity  Goal: Skin integrity is maintained or improved  Outcome: Progressing     Problem: Pain - Standard  Goal: Alleviation of pain or a reduction in pain to the patient’s comfort goal  Outcome: Progressing     Problem: Fall Risk  Goal: Patient will remain free from falls  Outcome: Progressing     Problem: Communication  Goal: The ability to communicate needs accurately and effectively will improve  Outcome: Progressing     Problem: Mobility  Goal: Patient's capacity to carry out activities will improve  Outcome: Progressing     Problem: Safety - Medical Restraint  Goal: Free from restraint(s) (Restraint for Interference with Medical Device)  Outcome: Progressing       Patient is not progressing towards the following goals:

## 2023-03-28 NOTE — PROGRESS NOTES
Pulmonary Progress Note    Date of admission  3/21/2023    Chief Complaint  57 y.o. male admitted 3/21/2023 with acute hypoxic respiratory failure    Hospital Course  57 y.o. male who presented 3/21/2023 with a history of HFpEF, HTN, and cerebral aneurysm about 10 years ago.  He reports he was admitted at that time for a while.  He smokes, previously a pack a day but quit 2 years ago.  He denies significant shortness of breath at baseline.  He previously worked as a  but has been officially on disability since his aneurysm 10 years ago.  He denies wheezing or chronic cough.  Recently he has had several courses of steroids for possible pneumonias. He presented to an OSH with acute hypoxic respiratory failure and underwent CT scan with diffuse GGO with areas of mosaicism essentially from top to bottom in the lungs with no geographic predominance. Of note, when he was admitted with his aneurysm he was intubated for 8 days with diffuse patchy GGO on CT which was considered to be aspiration pneumonia at the time but appears to have upper lobe involvement and a pattern more consistent with ILD or inflammation.  Here he has been managed on BIPAP but is in increasing respiratory distress.  His infiltrates are becoming more dense and appear consistent with ARDS.  His volumes are low potentially due to abdominal obesity and fatigue.       Aldolase is positive, CKMB negative.  CTD  panels pending     Significant family history of COPD and smoking but no other lung diseases per patient's mother.  No CTD history in the family either.  He cannot provide a clear history about muscle weakness or pain.  No birds at home or farm exposures.     Interval Problem Update  Reviewed last 24 hour events:  3/26: Intubated in am, plan for proning.  Myositis panel ordered.  3/27: HP1 and HP2 panels negative.  Still on 12 of PEEP and 70% FiO2.  No other significant changes overnight.  3/28: Proning. PEEP 10. FIO2 80%. No significant  changes.     Review of Systems  Review of Systems   Unable to perform ROS: Intubated      Vital Signs for last 24 hours   Temp:  [36.2 °C (97.2 °F)-37.4 °C (99.3 °F)] 37.1 °C (98.8 °F)  Pulse:  [63-95] 87  Resp:  [24-52] 34  BP: ()/(42-68) 114/59  SpO2:  [89 %-100 %] 96 %    Hemodynamic parameters for last 24 hours       Respiratory Information for the last 24 hours  Vent Mode: APVCMV  Rate (breaths/min): 34  Vt Target (mL): 350  PEEP/CPAP: 10  MAP: 21  Control VTE (exp VT): 303    Physical Exam   Physical Exam  Vitals reviewed. Exam conducted with a chaperone present.   Constitutional:       Appearance: He is ill-appearing.   HENT:      Head: Atraumatic.   Cardiovascular:      Rate and Rhythm: Normal rate and regular rhythm.   Pulmonary:      Breath sounds: No wheezing or rhonchi.   Abdominal:      Palpations: Abdomen is soft.   Musculoskeletal:         General: No swelling, tenderness or deformity.      Right lower leg: No edema.      Left lower leg: No edema.   Skin:     General: Skin is warm and dry.   Neurological:      Comments: Intubated and sedated       Medications  Current Facility-Administered Medications   Medication Dose Route Frequency Provider Last Rate Last Admin    Pharmacy Consult: Enteral tube insertion - review meds/change route/product selection  1 Each Other PHARMACY TO DOSE Fidel Mcdonald Jr., CHANG.O.        fentaNYL (SUBLIMAZE) 50 mcg/mL in 50mL   Intravenous Continuous CHANG Huddleston Jr..O. 8 mL/hr at 03/28/23 0740 400 mcg/hr at 03/28/23 0740    propofol (DIPRIVAN) injection  0-80 mcg/kg/min (Ideal) Intravenous Continuous Fidel Mcdonald Jr. D.O. 37.2 mL/hr at 03/28/23 0823 80 mcg/kg/min at 03/28/23 0823    methylPREDNISolone sod succ (Solu-Medrol) 250 mg in  mL IVPB  250 mg Intravenous Q6HRS CHANG Huddleston Jr..O.   Stopped at 03/28/23 0659    insulin regular (HumuLIN R,NovoLIN R) injection  0-14 Units Intravenous Once CHANG Huddleston Jr..O.        insulin regular  (HumuLIN R) 100 Units in  mL Infusion  0-29 Units/hr Intravenous Continuous CHANG Huddleston Jr..O. 8.5 mL/hr at 03/28/23 1000 8.5 Units/hr at 03/28/23 1000    dextrose 10 % BOLUS 12.5-25 g  12.5-25 g Intravenous PRN CHANG Huddleston Jr..O.        fentaNYL (SUBLIMAZE) injection  mcg   mcg Intravenous Q HOUR PRN Adrian Ludwig M.D.   50 mcg at 03/28/23 0115    senna-docusate (PERICOLACE or SENOKOT S) 8.6-50 MG per tablet 2 Tablet  2 Tablet Enteral Tube BID CLARK PinoO.   2 Tablet at 03/28/23 0507    And    polyethylene glycol/lytes (MIRALAX) PACKET 1 Packet  1 Packet Enteral Tube QDAY PRN CLARK PinoO.   1 Packet at 03/28/23 0822    And    magnesium hydroxide (MILK OF MAGNESIA) suspension 30 mL  30 mL Enteral Tube QDAY PRN CLARK PinoOSimran        And    bisacodyl (DULCOLAX) suppository 10 mg  10 mg Rectal QDAY PRN CLARK PinoO.        sertraline (Zoloft) tablet 100 mg  100 mg Enteral Tube DAILY CLARK PinoOSimran   100 mg at 03/28/23 0507    acetaminophen (TYLENOL) tablet 1,000 mg  1,000 mg Enteral Tube Q6HRS PRN CLARK PinoOSimran        rosuvastatin (CRESTOR) tablet 20 mg  20 mg Enteral Tube Q EVENING CLARK PinoO.   20 mg at 03/27/23 1825    Respiratory Therapy Consult   Nebulization Continuous RT Vince Daniels D.O.        famotidine (PEPCID) tablet 20 mg  20 mg Enteral Tube Q12HRS CAHNG Pino.O.   20 mg at 03/27/23 1800    Or    famotidine (PEPCID) injection 20 mg  20 mg Intravenous Q12HRS CHANG Pino.O.   20 mg at 03/28/23 0507    MD Alert...ICU Electrolyte Replacement per Pharmacy   Other PHARMACY TO DOSE Vince Daniels D.O.        lidocaine (XYLOCAINE) 1 % injection 2 mL  2 mL Tracheal Tube Q30 MIN PRN Vince Daniels D.O.        norepinephrine (Levophed) 8 mg in 250 mL NS infusion (premix)  0-1 mcg/kg/min (Ideal) Intravenous Continuous Vince Daniels D.O. 11.6 mL/hr at 03/28/23 0702 0.08 mcg/kg/min at 03/28/23 0702    doxycycline (VIBRAMYCIN) 100 mg in  mL IVPB  100 mg  Intravenous Q12HRS CHANG Pino.O.   Stopped at 03/28/23 0610    cefepime (Maxipime) 2 g in  mL IVPB  2 g Intravenous Q8HRS CHANG Pino.O.   Stopped at 03/28/23 0539    enoxaparin (Lovenox) inj 40 mg  40 mg Subcutaneous Q12HRS CHANG Pino.O.   40 mg at 03/28/23 0507    ipratropium-albuterol (DUONEB) nebulizer solution  3 mL Nebulization Q4H PRN (RT) Chino Mendoza M.D.   3 mL at 03/21/23 2338    [Held by provider] fluticasone (FLOVENT HFA) 44 MCG/ACT inhaler 88 mcg  2 Puff Inhalation QDAILANGELES (RT) Chino Mendoza M.D.   88 mcg at 03/25/23 0718    [Held by provider] umeclidinium-vilanterol (Anoro Ellipta) inhaler 1 Puff  1 Puff Inhalation QDAILANGELES (RT) Chino Mendoza M.D.   1 Puff at 03/25/23 0718    ipratropium-albuterol (DUONEB) nebulizer solution  3 mL Nebulization Q4HRS (RT) Brennan Martin M.D.   3 mL at 03/28/23 1020       Fluids    Intake/Output Summary (Last 24 hours) at 3/28/2023 1025  Last data filed at 3/28/2023 0800  Gross per 24 hour   Intake 2166.24 ml   Output 1505 ml   Net 661.24 ml         Laboratory  Recent Labs     03/27/23  2204 03/28/23  0329 03/28/23  0657   ISTATAPH 7.300* 7.289* 7.192*   ISTATAPCO2 61.1* 62.2* 73.4*   ISTATAPO2 72 120* 75   ISTATATCO2 32 32 30   [id number] 92* 98 90*   ISTATARTHCO3 30.1* 29.8* 28.2*   ISTATARTBE 2 1 -2   ISTATTEMP 37.4 C 37.4 C 37.1 C   ISTATFIO2 70 100 80   ISTATSPEC Arterial Arterial Arterial   ISTATAPHTC 7.295* 7.283* 7.191*   [id number] 74 123* 76           Recent Labs     03/26/23  0545 03/27/23  0213 03/28/23  0305   SODIUM 136 136 138   POTASSIUM 3.5* 4.8 4.7   CHLORIDE 109 96 100   CO2 27 26 26   BUN 37* 64* 69*   CREATININE 0.71 1.41* 1.40   MAGNESIUM  --  2.5 2.7*   PHOSPHORUS  --  7.2* 5.2*   CALCIUM 7.7* 9.3 9.3       Recent Labs     03/26/23  0545 03/27/23 0213 03/27/23  1100 03/28/23 0305   ALTSGPT  --  50  --   --    ASTSGOT  --  15  --   --    ALKPHOSPHAT  --  102*  --   --    TBILIRUBIN  --  0.4  --   --    PREALBUMIN  --    --  26.2 25.6   GLUCOSE 172* 252*  --  195*       Recent Labs     03/26/23  0545 03/27/23  0213 03/28/23  0305   WBC 16.0* 27.5* 25.2*   NEUTSPOLYS 89.50* 91.90* 93.70*   LYMPHOCYTES 5.70* 2.90* 1.50*   MONOCYTES 4.10 3.70 3.40   EOSINOPHILS 0.10 0.10 0.00   BASOPHILS 0.10 0.20 0.10   ASTSGOT  --  15  --    ALTSGPT  --  50  --    ALKPHOSPHAT  --  102*  --    TBILIRUBIN  --  0.4  --        Recent Labs     03/26/23  0545 03/27/23  0213 03/28/23  0305   RBC 5.08 5.75 5.48   HEMOGLOBIN 13.9* 15.5 15.0   HEMATOCRIT 42.0 50.6 48.4   PLATELETCT 222 308 231         Imaging  X-Ray:  I have personally reviewed the images and compared with prior images.  CT:    Reviewed  Echo:   Reviewed    Assessment/Plan  #Acute hypoxic respiratory failure  #Diffuse GGO on CT -   #PSA pneumonia with ARDS  Plan:  I personally reviewed CT scans from 2013 as well as now.  The patient has a history of mid & upper lung findings dating back to 2013.  He was also intubated in 2013.  Interesting that he is worked as a .  Differential diagnosis includes hypersensitivity pneumonitis versus sarcoidosis versus pneumoconiosis.  I discussed with the team on 3/27.  I recommend pulsing with Solu-Medrol 250 mg every 6 hours for 48 to 72 hours and monitoring response. I also recommended reaching out to the [facility], as his history of of abnormalities dating back to 2013 suggest that this is more than simply ARDs. He may need consideration for ECMO if he demonstrates progression, and he may also need evaluation for possible transplant.    - ANCA - MPO & PR3 negative   - THELMA negative   - CTD panel negative   - HP panel pending   - Aldolase positive   - ESR/CRP elevated   - CPK normal   - Sputum DFA negative   - Culture with PSA + growth   - BAL 3/26 - pending cell differential, BAL galactomannan, lymphocyte subsets, PCP DFA, TB, fungal and respiratory culture/gram stain and cytology.  - Will assess patient's progress now intubated and  proning  - f/u BAL studies  - Diurese as much as possible  - Sent extended myositis panel under misc on 3/26 - [facility] [id number]  - HP panels negative; this does not exclude HP from the differential.  - Continue abx for PSA  - Consider Bactrim for PJP coverage until DFA from BAL is negative  - Pulmonary will follow    Total consult time: 40 minutes which included time spent on chart review, personally reviewing pertinent images and labs, time spent counseling and educating the patient and/or family members, and coordinating care with the healthcare team to include consultants.     You Loredo, DO  Staff Pulmonologist and Intensivist  [facility]     Please note that this dictation was created using voice recognition software. The accuracy of the dictation is limited to the abilities of the software. I have made every reasonable attempt to correct obvious errors, but I expect that there are errors of grammar and possibly content that I did not discover before finalizing the note.

## 2023-03-28 NOTE — PROGRESS NOTES
"[facility] Progress Note    Admit Date: 3/21/2023    Resident(s): Hemal Tariq D.O.   Attending:  CARTER WELCH/ Dr. Mcdonald    Patient ID:    Name:  Josse Valle   YOB: 1965  Age:  57 y.o.  male   MRN:  [id number]    Chief Complaint  Acute Hypoxic Respiratory Failure    Hospital Course (carried forward and updated):  \"57-year-old male, PMH ruptured SAH 2013, disabled since, HTN, obesity, prior smoker quit 2 years ago, COPD not usually oxygen dependent, recently admitted at [facility] 3/9 - 3/12 with presumed COPD exacerbation and pneumonia, treated with steroids and antibiotics, discharged home on 6 L oxygen.  After completed steroids he began feeling much worse, readmitted 3/19 with increasing respiratory distress, requiring high flow nasal cannula alternating with BiPAP and high FiO2.  CT scan of the chest 3/21 showed no evidence of pulm embolism, significant bilateral groundglass and reticular infiltrates throughout all lung fields.  He is started back on IV corticosteroids, broad-spectrum antibiotics for possible HCAP with cefepime and vancomycin and transferred to [facility] for further evaluation.  Further work-up there included a high CRP of 261, ESR 65, WBC 23 and no peripheral eosinophilia.     Patient worked as a  until his ruptured SAH in 2013 and has been on disability since.  He does not have any pussycats or birdies but does have a little puppy chiwennie.  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  He denies any recreational/illicit drug use.  He had not required supplemental oxygen until his recent hospitalization.\" - Dr. Martin's note    3/21 - admitted to ICU.  3/22 - s/p solumedrol 500mg IV x1, followed with 80 Q8H  3/22 - lasix 40 Q12H   3/25 - unasyn changed to cefepime due to Pseudomonas on sputum culture  3/26 - Intubated  3/27 - VD #2, APV-CMV, RR 32, PEEP 10,  ; " started high dose solumedrol 80->250 q6h, Insulin GTT, stopped paralytics  3/28 - VD #3      Consultants:  Critical Care  Pulmonology    Interval Events:  WBC 27.5-->25.2  Cr 0.71 --> 1.41-->1.40  ABG 7.289/62.2/120  CXR w/ pulmonary edema and/or infiltrates, stable  EKG SR,   Sputum culture (3/23/23): + for pseudomonas koreensis, pan sensitivity, C3  Bcx pending  CT chest w/o (3/27/23):  w/ diffuse hazy parenchymal opacities, vs pneumonitis, probable background pulmonary fibrosis    IGE serum, pneumocytitis DFA pending, fungal culture pending    Reviewed last 24 hour events:  Neuro: RASS -5, nor corneal, cough, or gag  HR: [63-95] 90; SR  SBP: ()/(42-68) 91/42 ; levophed 0.08 mcg/kg/min  Tmax: AF  GI: Last BM 3/24, tube feed started  I/O: +1913/-1485/+428 ; -7121 since admit  Lines: CVC, NG tube, L radial art line, trejo  Mobility: Level 1  Resp: VD #3, APV-CMV, RR 32->34, PEEP 10, , FiO2 70%->80%, duoneb q4h, methylprednisolone 250 mg every 6 hours  Vte: Lovenox  PPI/H2: H2  Antibx: Cefepime (3/25-), Doxy (3/26-)  Continuous Meds: fentanyl 400 mcg/hr, propofol 80 mcg/kg/min, levophed 0.08 mcg/kg/min, Insulin 6 units/hr    -Insulin drip glucose 180-200s  -Will consider Bactrim for PCP until PCP cultures negative, pending kidney function, pneumocytitis DFA pending, fungal culture pending  -Vent settings changed for increased hypercapnea, RR 32->34, PEEP 10, -->400, FiO2 70%->80%  -Solumedrol 250 mg q6 for 24 hours more, then reassess for corticosteroid response  -Continue Cefepime x 5-7 days total  -Consider for transfer for possible ECMO        Review of Systems  Review of Systems   Unable to perform ROS: Intubated     Vital Signs for the last 24 hours  Temp:  [36.2 °C (97.2 °F)-37.4 °C (99.3 °F)] 37.1 °C (98.8 °F)  Pulse:  [63-95] 87  Resp:  [24-52] 34  BP: ()/(42-68) 114/59  SpO2:  [89 %-100 %] 96 %    Hemodynamic parameters for the last 24 hours       Vent Settings for the last 24  hours  Vent Mode: APVCMV  Rate (breaths/min): 34  Vt Target (mL): 350  PEEP/CPAP: 10  MAP: 21  Control VTE (exp VT): 303    Physical Exam  Physical Exam  Constitutional:       Appearance: He is ill-appearing.   HENT:      Head: Atraumatic.      Mouth/Throat:      Mouth: Mucous membranes are moist.   Eyes:      Extraocular Movements: Extraocular movements intact.   Cardiovascular:      Rate and Rhythm: Normal rate and regular rhythm.      Comments: RIJ in place, left arterial line in place  Pulmonary:      Comments: Mechanical breath sounds  Abdominal:      Palpations: Abdomen is soft.   Genitourinary:     Comments: Cole in place  Musculoskeletal:         General: No swelling, tenderness or deformity.   Skin:     General: Skin is warm and dry.   Neurological:      Comments: Intubated and sedated   Psychiatric:      Comments: Unable to assess       Medications  Current Facility-Administered Medications   Medication Dose Route Frequency Provider Last Rate Last Admin    Pharmacy Consult: Enteral tube insertion - review meds/change route/product selection  1 Each Other PHARMACY TO DOSE CHANG Huddleston Jr..OSimran        fentaNYL (SUBLIMAZE) 50 mcg/mL in 50mL   Intravenous Continuous CHANG Huddleston Jr..O. 8 mL/hr at 03/28/23 0740 400 mcg/hr at 03/28/23 0740    propofol (DIPRIVAN) injection  0-80 mcg/kg/min (Ideal) Intravenous Continuous CHANG Huddleston Jr..O. 37.2 mL/hr at 03/28/23 0823 80 mcg/kg/min at 03/28/23 0823    methylPREDNISolone sod succ (Solu-Medrol) 250 mg in  mL IVPB  250 mg Intravenous Q6HRS CHANG Huddleston Jr..O.   Stopped at 03/28/23 0659    insulin regular (HumuLIN R,NovoLIN R) injection  0-14 Units Intravenous Once CHANG Huddleston Jr..OSimran        insulin regular (HumuLIN R) 100 Units in  mL Infusion  0-29 Units/hr Intravenous Continuous CHANG Huddleston Jr..O. 8.5 mL/hr at 03/28/23 1000 8.5 Units/hr at 03/28/23 1000    dextrose 10 % BOLUS 12.5-25 g  12.5-25 g Intravenous PRN  Fidel Mcdonald Jr., D.O.        fentaNYL (SUBLIMAZE) injection  mcg   mcg Intravenous Q HOUR PRN Adrian Ludwig M.D.   50 mcg at 03/28/23 0115    senna-docusate (PERICOLACE or SENOKOT S) 8.6-50 MG per tablet 2 Tablet  2 Tablet Enteral Tube BID CLARK PinoOSimran   2 Tablet at 03/28/23 0507    And    polyethylene glycol/lytes (MIRALAX) PACKET 1 Packet  1 Packet Enteral Tube QDAY PRN CLARK PinoO.   1 Packet at 03/28/23 0822    And    magnesium hydroxide (MILK OF MAGNESIA) suspension 30 mL  30 mL Enteral Tube QDAY PRN Vince Daniels D.O.        And    bisacodyl (DULCOLAX) suppository 10 mg  10 mg Rectal QDAY PRN CLARK PinoOSimran        sertraline (Zoloft) tablet 100 mg  100 mg Enteral Tube DAILY CLARK PinoOSimran   100 mg at 03/28/23 0507    acetaminophen (TYLENOL) tablet 1,000 mg  1,000 mg Enteral Tube Q6HRS PRN CLARK PinoOSimran        rosuvastatin (CRESTOR) tablet 20 mg  20 mg Enteral Tube Q EVENING CLARK PinoO.   20 mg at 03/27/23 1825    Respiratory Therapy Consult   Nebulization Continuous RT Vince Daniels D.O.        famotidine (PEPCID) tablet 20 mg  20 mg Enteral Tube Q12HRS CLARK PinoO.   20 mg at 03/27/23 1800    Or    famotidine (PEPCID) injection 20 mg  20 mg Intravenous Q12HRS CHANG Pino.O.   20 mg at 03/28/23 0507    MD Alert...ICU Electrolyte Replacement per Pharmacy   Other PHARMACY TO DOSE Vince Daniels D.O.        lidocaine (XYLOCAINE) 1 % injection 2 mL  2 mL Tracheal Tube Q30 MIN PRN Vince Daniels D.O.        norepinephrine (Levophed) 8 mg in 250 mL NS infusion (premix)  0-1 mcg/kg/min (Ideal) Intravenous Continuous CLARK PinoOSimran 11.6 mL/hr at 03/28/23 0702 0.08 mcg/kg/min at 03/28/23 0702    doxycycline (VIBRAMYCIN) 100 mg in  mL IVPB  100 mg Intravenous Q12HRS Vince Daniels D.O.   Stopped at 03/28/23 0610    cefepime (Maxipime) 2 g in  mL IVPB  2 g Intravenous Q8HRS Vince Daniels D.O.   Stopped at 03/28/23 0539    enoxaparin (Lovenox) inj 40 mg  40 mg Subcutaneous Q12HRS  Vince Daniels D.O.   40 mg at 03/28/23 0507    ipratropium-albuterol (DUONEB) nebulizer solution  3 mL Nebulization Q4H PRN (RT) Chino Mendoza M.D.   3 mL at 03/21/23 2338    [Held by provider] fluticasone (FLOVENT HFA) 44 MCG/ACT inhaler 88 mcg  2 Puff Inhalation QDAILY (RT) Chino Mendoza M.D.   88 mcg at 03/25/23 0718    [Held by provider] umeclidinium-vilanterol (Anoro Ellipta) inhaler 1 Puff  1 Puff Inhalation QDAILANGELES (RT) Chino Mendoza M.D.   1 Puff at 03/25/23 0718    ipratropium-albuterol (DUONEB) nebulizer solution  3 mL Nebulization Q4HRS (RT) Brennan Martin M.D.   3 mL at 03/28/23 1020       Fluids    Intake/Output Summary (Last 24 hours) at 3/28/2023 1027  Last data filed at 3/28/2023 0800  Gross per 24 hour   Intake 2166.24 ml   Output 1505 ml   Net 661.24 ml       Laboratory  Recent Labs     03/27/23  2204 03/28/23  0329 03/28/23  0657   ISTATAPH 7.300* 7.289* 7.192*   ISTATAPCO2 61.1* 62.2* 73.4*   ISTATAPO2 72 120* 75   ISTATATCO2 32 32 30   EDZADEP8PHU 92* 98 90*   ISTATARTHCO3 30.1* 29.8* 28.2*   ISTATARTBE 2 1 -2   ISTATTEMP 37.4 C 37.4 C 37.1 C   ISTATFIO2 70 100 80   ISTATSPEC Arterial Arterial Arterial   ISTATAPHTC 7.295* 7.283* 7.191*   [id number] 74 123* 76     Recent Labs     03/26/23  0545 03/27/23  0213 03/28/23  0305   SODIUM 136 136 138   POTASSIUM 3.5* 4.8 4.7   CHLORIDE 109 96 100   CO2 27 26 26   BUN 37* 64* 69*   CREATININE 0.71 1.41* 1.40   MAGNESIUM  --  2.5 2.7*   PHOSPHORUS  --  7.2* 5.2*   CALCIUM 7.7* 9.3 9.3     Recent Labs     03/26/23  0545 03/27/23 0213 03/27/23  1100 03/28/23 0305   ALTSGPT  --  50  --   --    ASTSGOT  --  15  --   --    ALKPHOSPHAT  --  102*  --   --    TBILIRUBIN  --  0.4  --   --    PREALBUMIN  --   --  26.2 25.6   GLUCOSE 172* 252*  --  195*     Recent Labs     03/26/23  0545 03/27/23 0213 03/28/23 0305   WBC 16.0* 27.5* 25.2*   NEUTSPOLYS 89.50* 91.90* 93.70*   LYMPHOCYTES 5.70* 2.90* 1.50*   MONOCYTES 4.10 3.70 3.40   EOSINOPHILS 0.10  0.10 0.00   BASOPHILS 0.10 0.20 0.10   ASTSGOT  --  15  --    ALTSGPT  --  50  --    ALKPHOSPHAT  --  102*  --    TBILIRUBIN  --  0.4  --      Recent Labs     03/26/23  0545 03/27/23  0213 03/28/23  0305   RBC 5.08 5.75 5.48   HEMOGLOBIN 13.9* 15.5 15.0   HEMATOCRIT 42.0 50.6 48.4   PLATELETCT 222 308 231       Imaging  X-Ray:  I have personally reviewed the images and compared with prior images.  EKG:  I have personally reviewed the images and compared with prior images.  CT:    Reviewed  Echo:   Reviewed    ASSESSEMENT and PLAN:    * ARDS (adult respiratory distress syndrome) (HCC)  Assessment & Plan  *Intubated 3/26, pending work-up for cause as below for acute respiratory failure with hypoxia  *PaO2/FiO2 ratio 137 mm Hg, Moderate ARDS    -Vent settings changed for increased hypercapnea, RR 32->34, PEEP 10, -->400, FiO2 70%->80%  - Goal sat >88%, paO2 >55, Pplat <30  - Serial ABGs  -CXR to monitor lung volumes and tube/line placement   -VAP bundle prevention, oral care, post pyloric feeding   -Head of bed > 30 degree   -GI prophylaxis   -Daily awakening and SBT trials unless contraindicated   -Monitor for liberation   -Respiratory treatments: prn   -Hold LASIX in setting of HAYES    Acute respiratory failure with hypoxemia (HCC)- (present on admission)  Assessment & Plan  *Due to severe ARDS due to concern of suspected ILD, unclear etiology   *ANCA, MPO & PR3 negative, THELMA negative, CTD panel negative,  HP panel negative, Coccidiodes antibodies negative, resp viral panel negative, legionella and strep pneumo negative, pneumocystitis negative, Aspergillus negative, Sputum DFA negative, Culture with PSA + growth, BAL 3/26 - white, clear, polys 85%, lymph 4%  *CT chest w/o (3/27/23):  w/ diffuse hazy parenchymal opacities, vs pneumonitis, probable background pulmonary fibrosis  *The differential is very broad from PSA associated ARDS to possible myositis associated ILD and many other possibilities in between  including PCP, HP, NSIP and     -Extended myositis panel under misc on 3/26 - [facility] [id number] pending   -ARDS management as stated above  -Hold diuresis 2/2 HAYES  -Will hold off for flolan at this time and reassess   -Continue with proning 16 hr + supine 8 hr  -Tube feeding (3/27-)  -Stop paralytics, keep RAAS -5  -WillStart  consider Bactrim for PCP until PCP cultures negative, pending kidney function, pneumocytitis DFA pending, fungal culture pending  -Vent settings changed for increased hypercapnea, RR 32->34, PEEP 10, -->400, FiO2 70%->80%  -Solumedrol 250 mg q6 for 24 hours more, then reassess for corticosteroid response  -Continue Cefepime x 5-7 days total  -Consider for transfer for possible ECMO    Hyperglycemia  Assessment & Plan  *Likely 2/2 steroid use    -Increase from moderate to high sliding scale lantus for goal of 140-180    Acute kidney injury (HCC)  Assessment & Plan    -HAYES, possible, pre-renal, hold furosemide 20 mg IV BID     Pseudomonas pneumonia (HCC)  Assessment & Plan  *Sputum culture (3/23/23): + for pseudomonas koreensis, pan sensitivity, C3  Bcx pending    -Continue Cefepime    Severe pulmonary hypertension (HCC)  Assessment & Plan  *This is evident on echo done on 03/22/2023 showing RVSP of 65-70 with an EF of 55%.  Also showing moderately dilated right ventricle.  *This is likely secondary to not only sleep apnea but also  highly suspect that the ILD    -Continue furosemide and steroid  -Hold off on Flolan    (HFpEF) heart failure with preserved ejection fraction (HCC)  Assessment & Plan  *Acute on chronic, BNP 2085, some pleural edema noted on CXR   *Echo (3/22/23): EF 55%, RSVP 65-70 mmHg, moderately dilated right ventricle    -Daily weight  -Daily I's and O's  -Currently on diuretics  -Also on metoprolol and benazepril    Suspected sleep apnea  Assessment & Plan  STOP BANG of 7  Will need sleep study after discharge    Obesity  Assessment & Plan  More discussion to be done about  weight loss and diet control    COPD (chronic obstructive pulmonary disease) ([facility])  Assessment & Plan  On home meds    HTN (hypertension)  Assessment & Plan  On amlodipine    Mood disorder (HCC)  Assessment & Plan  Cont home sertraline    Hyperlipidemia  Assessment & Plan  Cont home statin    Subarachnoid hemorrhage from anterior communicating artery (HCC)- (present on admission)  Assessment & Plan  *History of in 2013        VTE:  Lovenox  Ulcer: H2 Antagonist  Lines: Central Line  Ongoing indication addressed, Arterial Line  Ongoing indication addressed, and Cole Catheter  Ongoing indication addressed    I have performed a physical exam and reviewed and updated ROS and Plan today (3/28/2023). In review of yesterday's note (3/27/2023), there are no changes except as documented above.     Discussed patient condition and risk of morbidity and/or mortality with RN, RT, Therapies, Pharmacy, , and Charge nurse / hot rounds      Hemal Tariq D.O.  PGY-2 Internal Medicine Resident

## 2023-03-29 NOTE — PROCEDURES
Procedure Note    Date: 3/29/2023  Time: 1120    Procedure: Bronchoscopy    Indication: elevated Pplat, hypoxia    Procedure: After obtaining consent, a time-out was performed. Respiratory therapy and nursing at bedside throughout procedure. Patient provided sedation and analgesia throughout the procedure. Placed on full ventilator support with an FiO2 of 100% throughout the procedure. Using a fiberoptic bronchoscope, trachea entered via ETT.  0 mL of local anesthetic sprayed at the sabi (2% lidocaine) achieving appropriate comfort level for patient. Airways visualized directly and the following intervention was performed: suctioning of obstructing mucus. Findings as below. Patient tolerated procedure well without any difficulties and left in care of bedside nurse/RT.     Medications: fentanyl, prop    Findings: Upper airway - Not visualized as bronchoscope passed through ETT.        Trachea to sabi - normal appearing mucosa without lesions or mass, ETT tip measured 2 cm from the sabi.        R proximal and distal airways - normal appearing mucosa without mass/lesion/anatomic variance, secretions: thick, white mucus lodged in the RML. Lavaged until clear.        L proximal and distal airways - normal appearing mucosa without mass/lesion/anatomic variance, secretions: thick, white mucus lodged in the LLL. Lavaged until clear.        Samples - none    Complications: none  CXR (if applicable): n/a    Fidel Mcdonald,   Critical Care Medicine

## 2023-03-29 NOTE — WOUND TEAM
[facility]  Inpatient Services  Wound and Skin Care Brief Evaluation    Admission Date: 3/21/2023     Last order of IP CONSULT TO WOUND CARE was found on 3/28/2023 from Hospital Encounter on 3/21/2023     HPI, PMH, SH: Reviewed    No chief complaint on file.    Diagnosis: Acute respiratory failure with hypoxemia (HCC) [J96.01]    Unit where seen by Wound Team: T618/00     Wound consult placed regarding right buttock, ischial area. Chart and images reviewed. This discussed with bedside RN. This RN in to assess patient. Pt intubated. Patient was proned and buttock assessed. Non-selectively debrided with NS/wound cleanser and gauze OR moist warm washcloth and no rinse foam soap. No pressure injuries or advanced wound care needs identified. Scar tissue noted to the right buttock, discoloration noted.  Wound consult completed. No further follow up unless indicated and consulted.           RSKIN:   CURRENTLY IN PLACE (X), APPLIED THIS VISIT (A), ORDERED (O):   Q shift Elijah:  X  Q shift pressure point assessments:  X    Surface/Positioning   Standard/Trauma Bed          Low Airloss          Bariatric RUBÉN     ICU RUBÉN            X                  Waffle cushion        Waffle Overlay     X     Reposition q 2 hours X     TAPs Turning system     Z Michel Pillow     Offloading/Redistribution   Sacral Offloading Dressing (Silicone dressing) X    Heel Offloading Dressing (Silicone dressing)         Heel float boots (Prevalon boot)           X  Float Heels off Bed with Pillows       x    Respiratory NA  Silicone O2 tubing         Gray Foam Ear protectors     Cannula fixation Device (Tender )          High flow offloading Clip    Elastic head band offloading device      Anchorfast                                                         Trach with Optifoam split foam             Containment/Moisture Prevention     Rectal tube or BMS    Purwick/Condom Cath        Cole Catheter  X  Barrier wipes           Barrier  paste       Antifungal tx      Interdry        Mobilization NA      Up to chair        Ambulate      PT/OT      Nutrition     NA  Dietician        Diabetes Education      PO     TF     TPN     NPO   # days     Other

## 2023-03-29 NOTE — PROGRESS NOTES
Pt breathing asynchronous with ventilator. Pt having tachypnea, respirations in the 40's- 50's. Pt on substantial amount of sedation. MD aware. Orders received. Will continue to monitor.

## 2023-03-29 NOTE — CARE PLAN
Problem: Ventilation  Goal: Ability to achieve and maintain unassisted ventilation or tolerate decreased levels of ventilator support  Description: Target End Date:  4 days     Document on Vent flowsheet    1.  Support and monitor invasive and noninvasive mechanical ventilation  2.  Monitor ventilator weaning response  3.  Perform ventilator associated pneumonia prevention interventions  4.  Manage ventilation therapy by monitoring diagnostic test results  Outcome: Progressing   Vent Day 4   APVCMV  34  370  12  100  Treatments: proning, Duo Q4, mucomyst, Q4, Q4 gases

## 2023-03-29 NOTE — PROGRESS NOTES
"[facility] Progress Note    Admit Date: 3/21/2023    Resident(s): Hemal Tariq D.O.   Attending:  CARTER WELCH/ Dr. Mcdonald    Patient ID:    Name:  Josse Valle   YOB: 1965  Age:  57 y.o.  male   MRN:  [id number]    Chief Complaint  Acute Hypoxic Respiratory Failure    Hospital Course (carried forward and updated):  \"57-year-old male, PMH ruptured SAH 2013, disabled since, HTN, obesity, prior smoker quit 2 years ago, COPD not usually oxygen dependent, recently admitted at [facility] 3/9 - 3/12 with presumed COPD exacerbation and pneumonia, treated with steroids and antibiotics, discharged home on 6 L oxygen.  After completed steroids he began feeling much worse, readmitted 3/19 with increasing respiratory distress, requiring high flow nasal cannula alternating with BiPAP and high FiO2.  CT scan of the chest 3/21 showed no evidence of pulm embolism, significant bilateral groundglass and reticular infiltrates throughout all lung fields.  He is started back on IV corticosteroids, broad-spectrum antibiotics for possible HCAP with cefepime and vancomycin and transferred to [facility] for further evaluation.  Further work-up there included a high CRP of 261, ESR 65, WBC 23 and no peripheral eosinophilia.     Patient worked as a  until his ruptured SAH in 2013 and has been on disability since.  He does not have any pussycats or birdies but does have a little puppy chiwennie.  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  He denies any recreational/illicit drug use.  He had not required supplemental oxygen until his recent hospitalization.\" - Dr. Martin's note    3/21 - admitted to ICU.  3/22 - s/p solumedrol 500mg IV x1, followed with 80 Q8H  3/22 - lasix 40 Q12H   3/25 - unasyn changed to cefepime due to Pseudomonas on sputum culture  3/26 - Intubated  3/27 - VD #2, APV-CMV, RR 32, PEEP 10,  ; " started high dose solumedrol 80->250 q6h, Insulin GTT, stopped paralytics  3/28 - VD #3, not responding to steroids, reached out to transfer centers for ECMO      Consultants:  Critical Care  Pulmonology    Interval Events:  WBC 25.2->15.1  Cr 0.71 --> 1.41-->1.40-> 2.55  ABG 7.28/62.2/120 --> 7.269/52.1/89  CXR w/ pulmonary edema and/or infiltrates, stable  EKG SR,   Sputum culture (3/23/23): + for pseudomonas koreensis, pan sensitivity, C3  Bcx 3/26 NGTD   CXR w/ mildly improved aeration in the left lung  *Fought vent, s/p rocuronium 50 mg, fentanyl 400 to 500 mcg/hr    IGE serum (3/25): 184, pneumocytitis DFA 3/26: Negative pending, fungal culture (3/26): Negative  Extended myositis panel (3/26):  pending      Reviewed last 24 hour events:  Neuro: RASS -5, nor corneal, cough, or gag  HR: [54-88] 62 ;  SR  SBP: ()/(43-72) 133/67 ; levophed 0.02 mcg/kg/min  Tmax: AF  GI: Last BM 3/28, large stool, NG tube feeding  I/O: +2351/-185/+2166; -5121 since admit  Lines: CVC, NG tube, L radial art line, trejo  Mobility: Level 1  Resp: VD #4, APV-CMV, RR 32->34, PEEP 10->12, ->400, FiO2 70%->80%->90%, duoneb q4h, methylprednisolone 250 mg every 6 hours  Vte: Heparin  PPI/H2: H2  Antibx: Cefepime (3/25-), Doxy (3/26-)  Continuous Meds: fentanyl 500 mcg/hr, propofol 80 mcg/kg/min, levophed 0.02 mcg/kg/min, Insulin 6.5 units/hr    -HAYES, 185 ml in 24 hr, nephrology consulted  -Lovenox to heparin for HAYES  -Day 5 of Cefepime + Day 4 of Doxy, likely continue Cefepime for 7 days given acuity of condition  -No Bactrim due to worsening kidney function and negative PCP DFA  -Not a candidate to transfer for ECMO w/ Corley Lung Score: 3.3 due to BMI 40.59  -Bronchoscopy today due to increasing PEEP up to 40s  -Pulmonology following, appreciate recommendations    Yesterday    -Insulin drip glucose 180-200s  -Will consider Bactrim for PCP until PCP cultures negative, pending kidney function, pneumocytitis DFA pending,  fungal culture pending  -Vent settings changed for increased hypercapnea, RR 32->34, PEEP 10, -->400, FiO2 70%->80%  -Solumedrol 250 mg q6 for 24 hours more, then reassess for corticosteroid response  -Continue Cefepime x 5-7 days total  -Consider for transfer for possible ECMO        Review of Systems  Review of Systems   Unable to perform ROS: Intubated   Cardiovascular:  Negative for orthopnea.     Vital Signs for the last 24 hours  Temp:  [36.4 °C (97.5 °F)] 36.4 °C (97.5 °F)  Pulse:  [54-74] 70  Resp:  [31-52] 34  BP: ()/(51-74) 113/69  SpO2:  [86 %-99 %] 99 %    Hemodynamic parameters for the last 24 hours       Vent Settings for the last 24 hours  Vent Mode: APVCMV  Rate (breaths/min): 34  Vt Target (mL): 400  PEEP/CPAP: 12  MAP: 20  Control VTE (exp VT): 300    Physical Exam  Physical Exam  Constitutional:       Appearance: He is ill-appearing.   HENT:      Head: Atraumatic.      Mouth/Throat:      Mouth: Mucous membranes are moist.   Eyes:      Extraocular Movements: Extraocular movements intact.   Cardiovascular:      Rate and Rhythm: Normal rate and regular rhythm.      Comments: RIJ in place, left arterial line in place  Pulmonary:      Comments: Mechanical breath sounds  Abdominal:      Palpations: Abdomen is soft.   Genitourinary:     Comments: Cole in place  Musculoskeletal:         General: No swelling, tenderness or deformity.   Skin:     General: Skin is warm and dry.   Neurological:      Comments: Intubated and sedated   Psychiatric:      Comments: Unable to assess       Medications  Current Facility-Administered Medications   Medication Dose Route Frequency Provider Last Rate Last Admin    [START ON 3/30/2023] famotidine (PEPCID) tablet 20 mg  20 mg Enteral Tube DAILY Fidel Mcdonald Jr., D.O.        Or    [START ON 3/30/2023] famotidine (PEPCID) injection 20 mg  20 mg Intravenous DAILY Fidel Mcdonald Jr., D.O.        heparin injection 5,000 Units  5,000 Units Subcutaneous Q8HRS  Fidel Mcdonald Jr., D.O.        methylnaltrexone (RELISTOR) 10.2 mg in syringe 0.51 mL  0.075 mg/kg Subcutaneous Once Fidel Mcdonald Jr., D.O.        cefepime (Maxipime) 2 g in  mL IVPB  2 g Intravenous Q12HRS Fidel Mcdonald Jr., D.O.        acetylcysteine (MUCOMYST) 20 % solution 3 mL  3 mL Inhalation Q4HRS Fidel Mcdonald Jr., D.O.        fentaNYL (SUBLIMAZE) 50 mcg/mL in 50mL   Intravenous Continuous Dee Craig M.D. 10 mL/hr at 03/29/23 0920 500 mcg/hr at 03/29/23 0920    midazolam (Versed) injection 5 mg  5 mg Intravenous Q HOUR PRN Dee Craig M.D.        Pharmacy Consult: Enteral tube insertion - review meds/change route/product selection  1 Each Other PHARMACY TO DOSE Fidel Mcdonald Jr., D.O.        propofol (DIPRIVAN) injection  0-80 mcg/kg/min (Ideal) Intravenous Continuous Fidel Mcdonald Jr., D.O. 37.2 mL/hr at 03/29/23 1059 80 mcg/kg/min at 03/29/23 1059    methylPREDNISolone sod succ (Solu-Medrol) 250 mg in  mL IVPB  250 mg Intravenous Q6HRS CHANG Huddleston Jr..OSimran   Stopped at 03/29/23 0920    insulin regular (HumuLIN R) 100 Units in  mL Infusion  0-29 Units/hr Intravenous Continuous Fidel Mcdonald Jr., D.O. 3.5 mL/hr at 03/29/23 1145 3.5 Units/hr at 03/29/23 1145    dextrose 10 % BOLUS 12.5-25 g  12.5-25 g Intravenous PRN Fidel Mcdonald Jr., D.O.        fentaNYL (SUBLIMAZE) injection  mcg   mcg Intravenous Q HOUR PRN Adrian Ludwig M.D.   100 mcg at 03/29/23 0140    senna-docusate (PERICOLACE or SENOKOT S) 8.6-50 MG per tablet 2 Tablet  2 Tablet Enteral Tube BID Vince Daniels D.O.   2 Tablet at 03/29/23 0600    And    polyethylene glycol/lytes (MIRALAX) PACKET 1 Packet  1 Packet Enteral Tube QDAY PRN CLARK PinoO.   1 Packet at 03/28/23 0822    And    magnesium hydroxide (MILK OF MAGNESIA) suspension 30 mL  30 mL Enteral Tube QDAY PRN CHANG Pino.O.        And    bisacodyl (DULCOLAX) suppository 10 mg  10 mg Rectal QDAY PRN CLARK PinoO.         sertraline (Zoloft) tablet 100 mg  100 mg Enteral Tube DAILY Vince Daniels D.O.   100 mg at 03/29/23 0559    acetaminophen (TYLENOL) tablet 1,000 mg  1,000 mg Enteral Tube Q6HRS PRN Vince Daniels D.O.        rosuvastatin (CRESTOR) tablet 20 mg  20 mg Enteral Tube Q EVENING Vince Daniels D.O.   20 mg at 03/28/23 1729    Respiratory Therapy Consult   Nebulization Continuous RT Vince Daniels D.O.        MD Alert...ICU Electrolyte Replacement per Pharmacy   Other PHARMACY TO DOSE Vince Daniels D.O.        lidocaine (XYLOCAINE) 1 % injection 2 mL  2 mL Tracheal Tube Q30 MIN PRN Vince Daniels D.O.        norepinephrine (Levophed) 8 mg in 250 mL NS infusion (premix)  0-1 mcg/kg/min (Ideal) Intravenous Continuous Vince Daniels D.O.   Stopped at 03/29/23 0720    doxycycline (VIBRAMYCIN) 100 mg in  mL IVPB  100 mg Intravenous Q12HRS Vince Daniels D.O.   Stopped at 03/29/23 0721    ipratropium-albuterol (DUONEB) nebulizer solution  3 mL Nebulization Q4H PRN (RT) Chino Mendoza M.D.   3 mL at 03/21/23 2338    [Held by provider] fluticasone (FLOVENT HFA) 44 MCG/ACT inhaler 88 mcg  2 Puff Inhalation QDAILY (RT) Chino Mendoza M.D.   88 mcg at 03/25/23 0718    [Held by provider] umeclidinium-vilanterol (Anoro Ellipta) inhaler 1 Puff  1 Puff Inhalation QDAILY (RT) Chino Mendoza M.D.   1 Puff at 03/25/23 0718    ipratropium-albuterol (DUONEB) nebulizer solution  3 mL Nebulization Q4HRS (RT) Brennan Martin M.D.   3 mL at 03/29/23 1008       Fluids    Intake/Output Summary (Last 24 hours) at 3/29/2023 1214  Last data filed at 3/29/2023 1200  Gross per 24 hour   Intake 2655.1 ml   Output 1347 ml   Net 1308.1 ml       Laboratory  Recent Labs     03/29/23  0253 03/29/23  0709 03/29/23  1059   ISTATAPH 7.269* 7.292* 7.226*   ISTATAPCO2 52.1* 52.9* 62.4*   ISTATAPO2 89* 78 80   ISTATATCO2 25 27 28   DHQRGJO6OPE 95 93 93   ISTATARTHCO3 23.8 25.5* 25.9*   ISTATARTBE -4 -2 -3   ISTATTEMP 36.0 C 36.2 C 97.5 F   ISTATFIO2 70 70 70   ISTATSPEC  Arterial Arterial Arterial   ISTATAPHTC 7.283* 7.303* 7.235*   [id number] 84 74 77     Recent Labs     03/27/23 0213 03/28/23 0305 03/29/23 0430   SODIUM 136 138 138   POTASSIUM 4.8 4.7 4.4   CHLORIDE 96 100 103   CO2 26 26 22   BUN 64* 69* 91*   CREATININE 1.41* 1.40 2.55*   MAGNESIUM 2.5 2.7* 2.7*   PHOSPHORUS 7.2* 5.2* 5.3*   CALCIUM 9.3 9.3 9.0     Recent Labs     03/27/23 0213 03/27/23  1100 03/28/23 0305 03/29/23 0430   ALTSGPT 50  --   --   --    ASTSGOT 15  --   --   --    ALKPHOSPHAT 102*  --   --   --    TBILIRUBIN 0.4  --   --   --    PREALBUMIN  --  26.2 25.6  --    GLUCOSE 252*  --  195* 157*     Recent Labs     03/27/23 0213 03/28/23 0305 03/29/23 0430   WBC 27.5* 25.2* 15.1*   NEUTSPOLYS 91.90* 93.70* 92.00*   LYMPHOCYTES 2.90* 1.50* 2.80*   MONOCYTES 3.70 3.40 3.80   EOSINOPHILS 0.10 0.00 0.00   BASOPHILS 0.20 0.10 0.10   ASTSGOT 15  --   --    ALTSGPT 50  --   --    ALKPHOSPHAT 102*  --   --    TBILIRUBIN 0.4  --   --      Recent Labs     03/27/23 0213 03/28/23 0305 03/29/23 0430   RBC 5.75 5.48 4.72   HEMOGLOBIN 15.5 15.0 13.1*   HEMATOCRIT 50.6 48.4 41.2*   PLATELETCT 308 231 167       Imaging  X-Ray:  I have personally reviewed the images and compared with prior images.  EKG:  I have personally reviewed the images and compared with prior images.  CT:    Reviewed  Echo:   Reviewed    ASSESSEMENT and PLAN:    * ARDS (adult respiratory distress syndrome) (HCC)  Assessment & Plan  *Intubated 3/26, pending work-up for cause as below for acute respiratory failure with hypoxia  *PaO2/FiO2 ratio 137 mm Hg, Moderate ARDS    -Management as below for acute respiratory failure with hypoxia  -VD #4, APV-CMV, RR 32->34, PEEP 10->12, ->400, FiO2 70%->80%->90%  -Goal sat >88%, paO2 >55, Pplat <30  -Serial ABGs  -CXR to monitor lung volumes and tube/line placement   -VAP bundle prevention, oral care, post pyloric feeding   -Head of bed > 30 degree   -GI prophylaxis   -Daily awakening and SBT  trials unless contraindicated   -Monitor for liberation   -Respiratory treatments: prn   -Hold LASIX in setting of HAYES    Acute respiratory failure with hypoxemia (HCC)- (present on admission)  Assessment & Plan  *Due to severe ARDS due to concern of suspected ILD, unclear etiology   *ANCA, MPO & PR3 negative, THELMA negative, CTD panel negative,  HP panel negative, Coccidiodes antibodies negative, resp viral panel negative, legionella and strep pneumo negative, pneumocystitis negative, IGE serum WNL, fungal culture negative, PCP DFA negative,  Aspergillus negative, Sputum DFA negative, Culture with PSA + growth, BAL 3/26 - white, clear, polys 85%, lymph 4%  *CT chest w/o (3/27/23):  w/ diffuse hazy parenchymal opacities, vs pneumonitis, probable background pulmonary fibrosis  *The differential is very broad from PSA associated ARDS to possible myositis associated ILD and many other possibilities in between including PCP, HP, NSIP and     -Extended myositis panel under misc on 3/26 - [facility] [id number] pending   -ARDS management as stated above  -Hold diuresis 2/2 HAYES  -Will hold off for flolan at this time and reassess   -Continue with proning 16 hr + supine 8 hr  -Tube feeding (3/27-)  -Stop paralytics, keep RAAS -5  -Solumedrol 250 mg q6, continue to reassess for corticosteroid response  -Day 5 of Cefepime + Day 4 of Doxy, likely continue Cefepime for 7 days given acuity of condition  -No Bactrim due to worsening kidney function and negative PCP DFA  -Not a candidate to transfer for ECMO w/ Corley Lung Score: 3.3 due to BMI 40.59  -Bronchoscopy today due to increasing PEEP up to 40s  -Pulmonology following, appreciate recommendations    Hyperglycemia  Assessment & Plan  *Likely 2/2 steroid use    -Insulin GTT, goal of 120-180    Acute kidney injury (HCC)  Assessment & Plan    -HAYES, possible, pre-renal, hold furosemide 20 mg IV BID     Pseudomonas pneumonia (HCC)  Assessment & Plan  *Sputum culture (3/23/23): + for  pseudomonas koreensis, pan sensitivity, C3  *Bcx 3/26 NGTD     -Continue Cefepime x 7 days    Severe pulmonary hypertension (HCC)  Assessment & Plan  *This is evident on echo done on 03/22/2023 showing RVSP of 65-70 with an EF of 55%.  Also showing moderately dilated right ventricle.  *This is likely secondary to not only sleep apnea but also  highly suspect that the ILD    -Continue furosemide and steroid  -Hold off on Flolan    (HFpEF) heart failure with preserved ejection fraction (HCC)  Assessment & Plan  *Acute on chronic, BNP 2085, some pleural edema noted on CXR   *Echo (3/22/23): EF 55%, RSVP 65-70 mmHg, moderately dilated right ventricle    -Daily weight  -Daily I's and O's  -Currently on diuretics  -Hold pratima metoprolol and benazepril    Suspected sleep apnea  Assessment & Plan  STOP BANG of 7  Will need sleep study after discharge    Obesity  Assessment & Plan  *BMI 40.59    -Discuss about weight management and nutrition outpatient    COPD (chronic obstructive pulmonary disease) ([facility])  Assessment & Plan    -Hold home inhalers given severity of condition    HTN (hypertension)  Assessment & Plan    -Hold antihypertensives 2/2 acuity of condition    Mood disorder ([facility])  Assessment & Plan  Cont home sertraline    Hyperlipidemia  Assessment & Plan  Cont home statin    Subarachnoid hemorrhage from anterior communicating artery (HCC)- (present on admission)  Assessment & Plan  *History of in 2013        VTE:  Heparin  Ulcer: H2 Antagonist  Lines: Central Line  Ongoing indication addressed, Arterial Line  Ongoing indication addressed, and Cole Catheter  Ongoing indication addressed    I have performed a physical exam and reviewed and updated ROS and Plan today (3/29/2023). In review of yesterday's note (3/28/2023), there are no changes except as documented above.     Discussed patient condition and risk of morbidity and/or mortality with RN, RT, Therapies, Pharmacy, , and Charge nurse / hot  axel Tariq D.O.  PGY-2 Internal Medicine Resident

## 2023-03-29 NOTE — PROGRESS NOTES
Pt's number no longer in service. Called spouse Paula YENIFER  providing Financial Assistance number [phone] as well as [facility] contact information.

## 2023-03-29 NOTE — PROGRESS NOTES
Pt vent alarm alerted RN that his breathing on his own.  Increased his fentanyl drip from 400 to 500 mcg/hr.  Alarm became more frequent.  Ordered 50 mg IV rocuronium ONCE.

## 2023-03-29 NOTE — DIETARY
Nutrition Services Brief Update:    Problem: Nutritional:  Goal: Nutrition support tolerated and meeting greater than 85% of estimated needs  Outcome: Met. Tf at goal per EMR. Peptamen Intense VHP, goal rate 70 ml/hr, providing 1680 kcals, 154 grams protein, 1411 mL free water, 127 (g of CHO).

## 2023-03-29 NOTE — PROGRESS NOTES
Pulmonary Progress Note    Date of admission  3/21/2023    Chief Complaint  57 y.o. male admitted 3/21/2023 with acute hypoxic respiratory failure    Hospital Course  57 y.o. male who presented 3/21/2023 with a history of HFpEF, HTN, and cerebral aneurysm about 10 years ago.  He reports he was admitted at that time for a while.  He smokes, previously a pack a day but quit 2 years ago.  He denies significant shortness of breath at baseline.  He previously worked as a  but has been officially on disability since his aneurysm 10 years ago.  He denies wheezing or chronic cough.  Recently he has had several courses of steroids for possible pneumonias. He presented to an OSH with acute hypoxic respiratory failure and underwent CT scan with diffuse GGO with areas of mosaicism essentially from top to bottom in the lungs with no geographic predominance. Of note, when he was admitted with his aneurysm he was intubated for 8 days with diffuse patchy GGO on CT which was considered to be aspiration pneumonia at the time but appears to have upper lobe involvement and a pattern more consistent with ILD or inflammation.  Here he has been managed on BIPAP but is in increasing respiratory distress.  His infiltrates are becoming more dense and appear consistent with ARDS.  His volumes are low potentially due to abdominal obesity and fatigue.       Aldolase is positive, CKMB negative.  CTD  panels pending     Significant family history of COPD and smoking but no other lung diseases per patient's mother.  No CTD history in the family either.  He cannot provide a clear history about muscle weakness or pain.  No birds at home or farm exposures.     Interval Problem Update  Reviewed last 24 hour events:  3/26: Intubated in am, plan for proning.  Myositis panel ordered.  3/27: HP1 and HP2 panels negative.  Still on 12 of PEEP and 70% FiO2.  No other significant changes overnight.  3/28: Proning. PEEP 10. FIO2 80%. No significant  changes.   3/29: No significant changes.  Transfer to [place] was declined because he is too obese for ECMO.    Review of Systems  Review of Systems   Unable to perform ROS: Intubated      Vital Signs for last 24 hours   Temp:  [36.4 °C (97.5 °F)] 36.4 °C (97.5 °F)  Pulse:  [54-74] 70  Resp:  [31-52] 34  BP: (101-148)/(51-74) 113/69  SpO2:  [86 %-99 %] 99 %    Hemodynamic parameters for last 24 hours       Respiratory Information for the last 24 hours  Vent Mode: APVCMV  Rate (breaths/min): 34  Vt Target (mL): 400  PEEP/CPAP: 12  MAP: 20  Control VTE (exp VT): 300    Physical Exam   Physical Exam  Vitals reviewed. Exam conducted with a chaperone present.   Constitutional:       Appearance: He is ill-appearing.   HENT:      Head: Atraumatic.   Cardiovascular:      Rate and Rhythm: Normal rate and regular rhythm.   Pulmonary:      Breath sounds: No wheezing or rhonchi.   Abdominal:      Palpations: Abdomen is soft.   Musculoskeletal:         General: No swelling, tenderness or deformity.      Right lower leg: No edema.      Left lower leg: No edema.   Skin:     General: Skin is warm and dry.   Neurological:      Comments: Intubated and sedated       Medications  Current Facility-Administered Medications   Medication Dose Route Frequency Provider Last Rate Last Admin    [START ON 3/30/2023] famotidine (PEPCID) tablet 20 mg  20 mg Enteral Tube DAILY Fidel Mcdonald Jr., D.O.        Or    [START ON 3/30/2023] famotidine (PEPCID) injection 20 mg  20 mg Intravenous DAILY CHANG Huddleston Jr..O.        heparin injection 5,000 Units  5,000 Units Subcutaneous Q8HRS CHANG Huddleston Jr..O.        cefepime (Maxipime) 2 g in  mL IVPB  2 g Intravenous Q12HRS CHANG Huddleston Jr..O.        acetylcysteine (MUCOMYST) 20 % solution 3 mL  3 mL Inhalation Q4HRS CHANG Huddleston Jr..O.        furosemide (LASIX) injection 80 mg  80 mg Intravenous 4X/DAY CHANG Huddleston Jr..O.        K+ Scale: Goal of 4.5  1 Each Intravenous  Q6HRS CLARK Huddleston Jr.OSimran        fentaNYL (SUBLIMAZE) 50 mcg/mL in 50mL   Intravenous Continuous Dee Craig M.D. 10 mL/hr at 03/29/23 0920 500 mcg/hr at 03/29/23 0920    midazolam (Versed) injection 5 mg  5 mg Intravenous Q HOUR PRN Dee Craig M.D.   5 mg at 03/29/23 1225    Pharmacy Consult: Enteral tube insertion - review meds/change route/product selection  1 Each Other PHARMACY TO DOSE CHANG Huddleston Jr..JUNIOR        propofol (DIPRIVAN) injection  0-80 mcg/kg/min (Ideal) Intravenous Continuous Fidel Mcdonald Jr., D.O. 37.2 mL/hr at 03/29/23 1059 80 mcg/kg/min at 03/29/23 1059    methylPREDNISolone sod succ (Solu-Medrol) 250 mg in  mL IVPB  250 mg Intravenous Q6HRS CHANG Huddleston Jr..O.   Stopped at 03/29/23 1321    insulin regular (HumuLIN R) 100 Units in  mL Infusion  0-29 Units/hr Intravenous Continuous CHANG Huddleston Jr..OSimran 4.5 mL/hr at 03/29/23 1314 4.5 Units/hr at 03/29/23 1314    dextrose 10 % BOLUS 12.5-25 g  12.5-25 g Intravenous PRN CHANG Huddleston Jr..OSimran        fentaNYL (SUBLIMAZE) injection  mcg   mcg Intravenous Q HOUR PRN Adrian Ludwig M.D.   100 mcg at 03/29/23 0140    senna-docusate (PERICOLACE or SENOKOT S) 8.6-50 MG per tablet 2 Tablet  2 Tablet Enteral Tube BID Vince Daniels D.O.   2 Tablet at 03/29/23 0600    And    polyethylene glycol/lytes (MIRALAX) PACKET 1 Packet  1 Packet Enteral Tube QDAY PRN CLARK PinoO.   1 Packet at 03/28/23 0822    And    magnesium hydroxide (MILK OF MAGNESIA) suspension 30 mL  30 mL Enteral Tube QDAY PRN Vince Daniels D.O.        And    bisacodyl (DULCOLAX) suppository 10 mg  10 mg Rectal QDAY PRN CLARK PinoOSimran        sertraline (Zoloft) tablet 100 mg  100 mg Enteral Tube DAILY Vince Daniels D.O.   100 mg at 03/29/23 0559    acetaminophen (TYLENOL) tablet 1,000 mg  1,000 mg Enteral Tube Q6HRS PRN CLARK PinoO.        rosuvastatin (CRESTOR) tablet 20 mg  20 mg Enteral Tube Q EVENING CLARK PinoOSimran   20  mg at 03/28/23 1729    Respiratory Therapy Consult   Nebulization Continuous RT Vince Daniels D.O.        MD Alert...ICU Electrolyte Replacement per Pharmacy   Other PHARMACY TO DOSE Vince Daniels D.O.        lidocaine (XYLOCAINE) 1 % injection 2 mL  2 mL Tracheal Tube Q30 MIN PRN Vince Daniels D.O.        norepinephrine (Levophed) 8 mg in 250 mL NS infusion (premix)  0-1 mcg/kg/min (Ideal) Intravenous Continuous Vince Daniels D.O.   Stopped at 03/29/23 0720    doxycycline (VIBRAMYCIN) 100 mg in  mL IVPB  100 mg Intravenous Q12HRS Vince Daniels D.O.   Stopped at 03/29/23 0721    ipratropium-albuterol (DUONEB) nebulizer solution  3 mL Nebulization Q4H PRN (RT) Chino Mendoza M.D.   3 mL at 03/21/23 2338    [Held by provider] fluticasone (FLOVENT HFA) 44 MCG/ACT inhaler 88 mcg  2 Puff Inhalation QDAILY (RT) Chino Mendoza M.D.   88 mcg at 03/25/23 0718    [Held by provider] umeclidinium-vilanterol (Anoro Ellipta) inhaler 1 Puff  1 Puff Inhalation QDAILANGELES (RT) Chino Mendoza M.D.   1 Puff at 03/25/23 0718    ipratropium-albuterol (DUONEB) nebulizer solution  3 mL Nebulization Q4HRS (RT) Brennan Martin M.D.   3 mL at 03/29/23 1008       Fluids    Intake/Output Summary (Last 24 hours) at 3/29/2023 1344  Last data filed at 3/29/2023 1200  Gross per 24 hour   Intake 2648.31 ml   Output 1347 ml   Net 1301.31 ml         Laboratory  Recent Labs     03/29/23  0253 03/29/23  0709 03/29/23  1059   ISTATAPH 7.269* 7.292* 7.226*   ISTATAPCO2 52.1* 52.9* 62.4*   ISTATAPO2 89* 78 80   ISTATATCO2 25 27 28   [id number] 95 93 93   ISTATARTHCO3 23.8 25.5* 25.9*   ISTATARTBE -4 -2 -3   ISTATTEMP 36.0 C 36.2 C 97.5 F   ISTATFIO2 70 70 70   ISTATSPEC Arterial Arterial Arterial   ISTATAPHTC 7.283* 7.303* 7.235*   [id number] 84 74 77           Recent Labs     03/27/23  0213 03/28/23  0305 03/29/23  0430   SODIUM 136 138 138   POTASSIUM 4.8 4.7 4.4   CHLORIDE 96 100 103   CO2 26 26 22   BUN 64* 69* 91*   CREATININE 1.41* 1.40 2.55*    MAGNESIUM 2.5 2.7* 2.7*   PHOSPHORUS 7.2* 5.2* 5.3*   CALCIUM 9.3 9.3 9.0       Recent Labs     03/27/23  0213 03/27/23  1100 03/28/23  0305 03/29/23  0430   ALTSGPT 50  --   --   --    ASTSGOT 15  --   --   --    ALKPHOSPHAT 102*  --   --   --    TBILIRUBIN 0.4  --   --   --    PREALBUMIN  --  26.2 25.6  --    GLUCOSE 252*  --  195* 157*       Recent Labs     03/27/23  0213 03/28/23  0305 03/29/23  0430   WBC 27.5* 25.2* 15.1*   NEUTSPOLYS 91.90* 93.70* 92.00*   LYMPHOCYTES 2.90* 1.50* 2.80*   MONOCYTES 3.70 3.40 3.80   EOSINOPHILS 0.10 0.00 0.00   BASOPHILS 0.20 0.10 0.10   ASTSGOT 15  --   --    ALTSGPT 50  --   --    ALKPHOSPHAT 102*  --   --    TBILIRUBIN 0.4  --   --        Recent Labs     03/27/23 0213 03/28/23  0305 03/29/23  0430   RBC 5.75 5.48 4.72   HEMOGLOBIN 15.5 15.0 13.1*   HEMATOCRIT 50.6 48.4 41.2*   PLATELETCT 308 231 167         Imaging  X-Ray:  I have personally reviewed the images and compared with prior images.  CT:    Reviewed  Echo:   Reviewed    Assessment/Plan  #Acute hypoxic respiratory failure  #Diffuse GGO on CT -   #PSA pneumonia with ARDS  Plan:  I personally reviewed CT scans from 2013 as well as now.  The patient has a history of mid & upper lung findings dating back to 2013.  He was also intubated in 2013.  Interesting that he is worked as a .  Differential diagnosis includes hypersensitivity pneumonitis versus sarcoidosis versus pneumoconiosis.  I discussed with the team on 3/27.  I recommend pulsing with Solu-Medrol 250 mg every 6 hours for 48 to 72 hours and monitoring response. He may need consideration for ECMO if he demonstrates progression, and he may also need evaluation for possible transplant.    - ANCA - MPO & PR3 negative   - THELMA negative   - CTD panel negative   - HP panel pending   - Aldolase positive   - ESR/CRP elevated   - CPK normal   - Sputum DFA negative   - Culture with PSA + growth   - BAL 3/26 - pending cell differential, BAL galactomannan,  lymphocyte subsets, PCP DFA, TB, fungal and respiratory culture/gram stain and cytology.  - Will assess patient's progress now intubated and proning  - f/u BAL studies  - Diurese as much as possible  - Sent extended myositis panel under misc on 3/26 - AR [id number]  -today will complete a 72 hour high dose Solumedrol pulse. Can wean back steroids starting tomorrow.   - HP panels negative; this does not exclude HP from the differential.  - Continue abx for PSA  - Consider Bactrim for PJP coverage until DFA from BAL is negative  - Pulmonary will follow    Total consult time: 38 minutes which included time spent on chart review, personally reviewing pertinent images and labs, time spent counseling and educating the patient and/or family members, and coordinating care with the healthcare team to include consultants.     You Loredo,   Staff Pulmonologist and Intensivist  [facility]     Please note that this dictation was created using voice recognition software. The accuracy of the dictation is limited to the abilities of the software. I have made every reasonable attempt to correct obvious errors, but I expect that there are errors of grammar and possibly content that I did not discover before finalizing the note.            Core Measures & Quality Metrics

## 2023-03-29 NOTE — CONSULTS
DATE OF SERVICE:  03/29/2023     NEPHROLOGY CONSULTATION     REQUESTING PHYSICIAN:  Dr. Fidel Mcdonald.     REASON FOR CONSULTATION:  To evaluate patient with acute kidney injury and   hypervolemia.     HISTORY OF PRESENT ILLNESS:  The patient is a 57-year-old male with history of   chronic obstructive pulmonary disease, oxygen dependent, also history of   ruptured subarachnoid aneurysm.  The patient initially presented to [facility] on 03/09, hospitalized a couple days with COPD   exacerbation, pneumonia, treated with steroids, antibiotics, and discharged   home on 6 liters of oxygen.  Returned to the hospital on 03/19 with worsening   shortness of breath, respiratory distress, transferred to [facility] for further evaluation and treatment.  Upon admission on 03/21,   creatinine level of 0.8 at his baseline.  Hospital course complicated with   respiratory failure, required intubation on mechanical ventilation.  Also,   worsening creatinine level since 03/27 up to 1.4 to 2.55 on consultation day.   Also, noticed decrease in urine output.  Currently, the patient is intubated   on mechanical ventilation.     REVIEW OF SYSTEMS:  Not possible to obtain as intubated and sedated.     PAST MEDICAL HISTORY:  Chronic obstructive pulmonary disease, alcohol   dependence withdrawal, arthritis, pneumonia, depression, hypertension, and   stroke.     PAST SURGICAL HISTORY:  Not possible to complete as the patient is intubated.     FAMILY HISTORY:  Positive for lung disease, diabetes mellitus type 2, and   hypertension.     ALLERGIES:  No known drug allergies.     OUTPATIENT MEDICATIONS:  Reviewed.     PHYSICAL EXAMINATION:    VITAL SIGNS:  Blood pressure 141/67, heart rate 61, temperature 36.3 Celsius.  GENERAL APPEARANCE:  Well-developed, well-nourished male, intubated on   mechanical ventilation.  HEENT:  Normocephalic, atraumatic.  Pupils equal, round, reactive to light.    Endotracheal tube in  place.  NECK:  No lymphadenopathy, no thyromegaly appreciated.  LUNGS:  Coarse breath sounds bilaterally.  Decreased breath sounds at bases.    No rales.  HEART:  Regular rhythm.  No rub or gallop.  ABDOMEN:  Soft, nondistended.  Bowel sounds diminished.  No palpable mass.  EXTREMITIES:  Trace pedal edema bilaterally.  No cyanosis.  NEUROLOGIC:  Not possible to complete as the patient is sedated.  SKIN:  Warm, dry.  No erythema or rash.     LABORATORY RESULT:  Revealed, hemoglobin level 13.1.  Sodium 138, potassium   4.4, BUN 91, creatinine level 2.55.     Phosphorus 5.3, magnesium 2.7.     CT chest, pulmonary fibrosis and pneumonitis.     Echocardiogram significant for severe pulmonary hypertension.     Urinalysis, no proteinuria, no hematuria.     ASSESSMENT AND PLAN:  The patient is a 57-year-old male with multiple medical   problems, admitted with chronic obstructive pulmonary disease exacerbation   with respiratory failure, now ventilator dependent, consulted for worsening   creatinine level and hypervolemia.  1.  Acute kidney injury on chronic kidney disease stage II, likely from   contrast-induced nephropathy as well as cardiac hemodynamics. Continue to   monitor closely.  2.  Electrolytes, borderline elevated phosphorus. To monitor.  Potassium well   controlled.  3.  Volume. To start Lasix intravenously.  4.  Hypertension.  Blood pressure remains well controlled.  5.  Anemia.  Hemoglobin stable.  6.  Chronic obstructive pulmonary disease exacerbation, ventilator-dependent   respiratory failure per primary team.     RECOMMENDATIONS:  1.  No need for emergent dialysis. To start intravenously Lasix.  Continue to   monitor urine output.  Continue to monitor renal function.  Avoid IV contrast.  2.  Adjust all medications to renal doses.  3.  Above plan was discussed with Dr. Fidel Mcdonald.  We will follow the   patient closely.     Thank for the consult.        ______________________________  EMERITA HUERTA  MD MERCADO/SONALI    DD:  03/29/2023 11:06  DT:  03/29/2023 12:14    Job#:  [id number]

## 2023-03-29 NOTE — CARE PLAN
Noted:  ADHD combo mild to mod.   Chronic motor tic.        The patient is Unstable - High likelihood or risk of patient condition declining or worsening    Shift Goals  Clinical Goals: hemodynamic stability  Patient Goals: jakob  Family Goals: jakob    Progress made toward(s) clinical / shift goals:    Problem: Knowledge Deficit - Standard  Goal: Patient and family/care givers will demonstrate understanding of plan of care, disease process/condition, diagnostic tests and medications  Outcome: Progressing     Problem: Skin Integrity  Goal: Skin integrity is maintained or improved  Outcome: Progressing     Problem: Pain - Standard  Goal: Alleviation of pain or a reduction in pain to the patient’s comfort goal  Outcome: Progressing     Problem: Fall Risk  Goal: Patient will remain free from falls  Outcome: Progressing     Problem: Psychosocial  Goal: Patient's level of anxiety will decrease  Outcome: Progressing  Goal: Patient's ability to verbalize feelings about condition will improve  Outcome: Progressing  Goal: Patient's ability to re-evaluate and adapt role responsibilities will improve  Outcome: Progressing  Goal: Patient and family will demonstrate ability to cope with life altering diagnosis and/or procedure  Outcome: Progressing  Goal: Spiritual and cultural needs incorporated into hospitalization  Outcome: Progressing     Problem: Communication  Goal: The ability to communicate needs accurately and effectively will improve  Outcome: Progressing     Problem: Discharge Barriers/Planning  Goal: Patient's continuum of care needs are met  Outcome: Progressing     Problem: Hemodynamics  Goal: Patient's hemodynamics, fluid balance and neurologic status will be stable or improve  Outcome: Progressing     Problem: Respiratory  Goal: Patient will achieve/maintain optimum respiratory ventilation and gas exchange  Outcome: Progressing     Problem: Chest Tube Management  Goal: Complications related to chest tube will be avoided or minimized  Outcome: Progressing     Problem: Fluid  Volume  Goal: Fluid volume balance will be maintained  Outcome: Progressing     Problem: Mechanical Ventilation  Goal: Safe management of artificial airway and ventilation  Outcome: Progressing  Goal: Successful weaning off mechanical ventilator, spontaneously maintains adequate gas exchange  Outcome: Progressing  Goal: Patient will be able to express needs and understand communication  Outcome: Progressing     Problem: Dysphagia  Goal: Dysphagia will improve  Outcome: Progressing     Problem: Risk for Aspiration  Goal: Patient's risk for aspiration will be absent or decrease  Outcome: Progressing     Problem: Nutrition  Goal: Patient's nutritional and fluid intake will be adequate or improve  Outcome: Progressing  Goal: Enteral nutrition will be maintained or improve  Outcome: Progressing  Goal: Enteral nutrition will be maintained or improve  Outcome: Progressing     Problem: Urinary Elimination  Goal: Establish and maintain regular urinary output  Outcome: Progressing     Problem: Bowel Elimination  Goal: Establish and maintain regular bowel function  Outcome: Progressing     Problem: Gastrointestinal Irritability  Goal: Nausea and vomiting will be absent or improve  Outcome: Progressing  Goal: Diarrhea will be absent or improved  Outcome: Progressing     Problem: Rectal Tube  Goal: Fecal output will be contained and skin will remain free from irritation  Outcome: Progressing     Problem: Mobility  Goal: Patient's capacity to carry out activities will improve  Outcome: Progressing     Problem: Self Care  Goal: Patient will have the ability to perform ADLs independently or with assistance (bathe, groom, dress, toilet and feed)  Outcome: Progressing     Problem: Infection - Standard  Goal: Patient will remain free from infection  Outcome: Progressing     Problem: Wound/ / Incision Healing  Goal: Patient's wound/surgical incision will decrease in size and heals properly  Outcome: Progressing     Problem: Safety -  Medical Restraint  Goal: Remains free of injury from restraints (Restraint for Interference with Medical Device)  Outcome: Progressing  Goal: Free from restraint(s) (Restraint for Interference with Medical Device)  Outcome: Progressing       Patient is not progressing towards the following goals:

## 2023-03-29 NOTE — CARE PLAN
Problem: Knowledge Deficit - Standard  Goal: Patient and family/care givers will demonstrate understanding of plan of care, disease process/condition, diagnostic tests and medications  Outcome: Progressing  Note: Family updated on POC, tests, and medications. Family verbalizes understanding and has no further questions at this time. Family educated on calling for any more questions.        Problem: Skin Integrity  Goal: Skin integrity is maintained or improved  Outcome: Progressing     Problem: Pain - Standard  Goal: Alleviation of pain or a reduction in pain to the patient’s comfort goal  Outcome: Progressing   The patient is Unstable - High likelihood or risk of patient condition declining or worsening    Shift Goals  Clinical Goals: Monitor ventilator and respiratory status, BS checks Q1hr  Patient Goals: ANTIONE  Family Goals: ANTIONE    Progress made toward(s) clinical / shift goals: respiratory status    Patient is not progressing towards the following goals: remains on insulin gtt       Addended by: KEVYN ARENAS on: 3/29/2023 01:08 PM     Modules accepted: Orders

## 2023-03-30 PROBLEM — Z71.89 GOALS OF CARE, COUNSELING/DISCUSSION: Status: ACTIVE | Noted: 2023-01-01

## 2023-03-30 NOTE — CARE PLAN
The patient is Unstable - High likelihood or risk of patient condition declining or worsening    Shift Goals  Clinical Goals: hemodynamic stability  Patient Goals: jakob  Family Goals: jakob    Progress made toward(s) clinical / shift goals:    Problem: Knowledge Deficit - Standard  Goal: Patient and family/care givers will demonstrate understanding of plan of care, disease process/condition, diagnostic tests and medications  Outcome: Progressing     Problem: Skin Integrity  Goal: Skin integrity is maintained or improved  Outcome: Progressing     Problem: Pain - Standard  Goal: Alleviation of pain or a reduction in pain to the patient’s comfort goal  Outcome: Progressing     Problem: Fall Risk  Goal: Patient will remain free from falls  Outcome: Progressing     Problem: Psychosocial  Goal: Patient's level of anxiety will decrease  Outcome: Progressing  Goal: Patient's ability to verbalize feelings about condition will improve  Outcome: Progressing  Goal: Patient's ability to re-evaluate and adapt role responsibilities will improve  Outcome: Progressing  Goal: Patient and family will demonstrate ability to cope with life altering diagnosis and/or procedure  Outcome: Progressing  Goal: Spiritual and cultural needs incorporated into hospitalization  Outcome: Progressing     Problem: Communication  Goal: The ability to communicate needs accurately and effectively will improve  Outcome: Progressing     Problem: Discharge Barriers/Planning  Goal: Patient's continuum of care needs are met  Outcome: Progressing     Problem: Hemodynamics  Goal: Patient's hemodynamics, fluid balance and neurologic status will be stable or improve  Outcome: Progressing     Problem: Respiratory  Goal: Patient will achieve/maintain optimum respiratory ventilation and gas exchange  Outcome: Progressing     Problem: Chest Tube Management  Goal: Complications related to chest tube will be avoided or minimized  Outcome: Progressing     Problem: Fluid  Volume  Goal: Fluid volume balance will be maintained  Outcome: Progressing     Problem: Mechanical Ventilation  Goal: Safe management of artificial airway and ventilation  Outcome: Progressing  Goal: Successful weaning off mechanical ventilator, spontaneously maintains adequate gas exchange  Outcome: Progressing  Goal: Patient will be able to express needs and understand communication  Outcome: Progressing     Problem: Dysphagia  Goal: Dysphagia will improve  Outcome: Progressing     Problem: Risk for Aspiration  Goal: Patient's risk for aspiration will be absent or decrease  Outcome: Progressing     Problem: Nutrition  Goal: Patient's nutritional and fluid intake will be adequate or improve  Outcome: Progressing  Goal: Enteral nutrition will be maintained or improve  Outcome: Progressing  Goal: Enteral nutrition will be maintained or improve  Outcome: Progressing     Problem: Urinary Elimination  Goal: Establish and maintain regular urinary output  Outcome: Progressing     Problem: Bowel Elimination  Goal: Establish and maintain regular bowel function  Outcome: Progressing     Problem: Gastrointestinal Irritability  Goal: Nausea and vomiting will be absent or improve  Outcome: Progressing  Goal: Diarrhea will be absent or improved  Outcome: Progressing     Problem: Rectal Tube  Goal: Fecal output will be contained and skin will remain free from irritation  Outcome: Progressing     Problem: Mobility  Goal: Patient's capacity to carry out activities will improve  Outcome: Progressing     Problem: Self Care  Goal: Patient will have the ability to perform ADLs independently or with assistance (bathe, groom, dress, toilet and feed)  Outcome: Progressing     Problem: Infection - Standard  Goal: Patient will remain free from infection  Outcome: Progressing     Problem: Wound/ / Incision Healing  Goal: Patient's wound/surgical incision will decrease in size and heals properly  Outcome: Progressing     Problem: Safety -  Medical Restraint  Goal: Remains free of injury from restraints (Restraint for Interference with Medical Device)  Outcome: Progressing  Goal: Free from restraint(s) (Restraint for Interference with Medical Device)  Outcome: Progressing       Patient is not progressing towards the following goals:

## 2023-03-30 NOTE — CARE PLAN
Problem: Bronchoconstriction  Goal: Improve in air movement and diminished wheezing  Description: Target End Date:  2 to 3 days    1.  Implement inhaled treatments  2.  Evaluate and manage medication effects  3/30/2023 1644 by Annie Antony, RRT  Outcome: Not Progressing  3/30/2023 1644 by Annie Antony, RRT  Reactivated   Pt remains on inline nebs.  Duo Q4  Mucomyst Q4    Problem: Ventilation  Goal: Ability to achieve and maintain unassisted ventilation or tolerate decreased levels of ventilator support  Description: Target End Date:  4 days     Document on Vent flowsheet    1.  Support and monitor invasive and noninvasive mechanical ventilation  2.  Monitor ventilator weaning response  3.  Perform ventilator associated pneumonia prevention interventions  4.  Manage ventilation therapy by monitoring diagnostic test results  Outcome: Not Progressing   PT is currently on APVCMV   Rate 40  Vt 370  Peep 10-12 currently 10  Fio2 % currently (90%)

## 2023-03-30 NOTE — CARE PLAN
The patient is Unstable - High likelihood or risk of patient condition declining or worsening    Shift Goals  Clinical Goals: hemodynamic stability  Patient Goals: jakob  Family Goals: jakob    Progress made toward(s) clinical / shift goals:    Problem: Knowledge Deficit - Standard  Goal: Patient and family/care givers will demonstrate understanding of plan of care, disease process/condition, diagnostic tests and medications  3/30/2023 0747 by Jasmine Ruth R.N.  Outcome: Progressing  3/30/2023 0343 by Jasmine Ruth R.N.  Outcome: Progressing     Problem: Skin Integrity  Goal: Skin integrity is maintained or improved  3/30/2023 0747 by Jasmine Ruth R.N.  Outcome: Progressing  3/30/2023 0343 by Jasmine Ruth R.N.  Outcome: Progressing     Problem: Pain - Standard  Goal: Alleviation of pain or a reduction in pain to the patient’s comfort goal  3/30/2023 0747 by Jasmine Ruth R.N.  Outcome: Progressing  3/30/2023 0343 by Jasmine Ruth R.N.  Outcome: Progressing     Problem: Fall Risk  Goal: Patient will remain free from falls  3/30/2023 0747 by Jasmine Ruth R.N.  Outcome: Progressing  3/30/2023 0343 by Jasmine Ruth RSimranN.  Outcome: Progressing     Problem: Psychosocial  Goal: Patient's level of anxiety will decrease  3/30/2023 0747 by Jasmine Ruth R.N.  Outcome: Progressing  3/30/2023 0343 by Jasmine Ruth RSimranN.  Outcome: Progressing  Goal: Patient's ability to verbalize feelings about condition will improve  3/30/2023 0747 by Jasmine Ruth R.N.  Outcome: Progressing  3/30/2023 0343 by JULES FernandezN.  Outcome: Progressing  Goal: Patient's ability to re-evaluate and adapt role responsibilities will improve  3/30/2023 0747 by Jasmine Ruth R.N.  Outcome: Progressing  3/30/2023 0343 by JULES FernandezN.  Outcome: Progressing  Goal: Patient and family will demonstrate ability to cope with life altering diagnosis and/or procedure  3/30/2023 0747 by Jasmine Ruth R.N.  Outcome:  Progressing  3/30/2023 0343 by Jasmine Ruth R.N.  Outcome: Progressing  Goal: Spiritual and cultural needs incorporated into hospitalization  3/30/2023 0747 by Jasmine Ruth R.N.  Outcome: Progressing  3/30/2023 0343 by Jasmine Ruth R.N.  Outcome: Progressing     Problem: Communication  Goal: The ability to communicate needs accurately and effectively will improve  3/30/2023 0747 by Jasmine Ruth R.N.  Outcome: Progressing  3/30/2023 0343 by Jasmine Ruth R.N.  Outcome: Progressing     Problem: Discharge Barriers/Planning  Goal: Patient's continuum of care needs are met  3/30/2023 0747 by Jasmine Ruth R.N.  Outcome: Progressing  3/30/2023 0343 by Jasmine Ruth R.N.  Outcome: Progressing     Problem: Hemodynamics  Goal: Patient's hemodynamics, fluid balance and neurologic status will be stable or improve  3/30/2023 0747 by Jasmine Ruth R.N.  Outcome: Progressing  3/30/2023 0343 by Jasmine Ruth R.N.  Outcome: Progressing     Problem: Respiratory  Goal: Patient will achieve/maintain optimum respiratory ventilation and gas exchange  3/30/2023 0747 by Jasmine Ruth R.N.  Outcome: Progressing  3/30/2023 0343 by Jasmine Ruth R.N.  Outcome: Progressing     Problem: Chest Tube Management  Goal: Complications related to chest tube will be avoided or minimized  3/30/2023 0747 by Jasmine Ruth R.N.  Outcome: Progressing  3/30/2023 0343 by Jasmine Ruth R.N.  Outcome: Progressing     Problem: Fluid Volume  Goal: Fluid volume balance will be maintained  3/30/2023 0747 by Jasmine Ruth R.N.  Outcome: Progressing  3/30/2023 0343 by Jasmine Ruth R.N.  Outcome: Progressing     Problem: Mechanical Ventilation  Goal: Safe management of artificial airway and ventilation  3/30/2023 0747 by Jasmine Ruth R.N.  Outcome: Progressing  3/30/2023 0343 by Jasmine Ruth R.N.  Outcome: Progressing  Goal: Successful weaning off mechanical ventilator, spontaneously maintains adequate gas  exchange  3/30/2023 0747 by Jasmine Ruth R.N.  Outcome: Progressing  3/30/2023 0343 by Jasmine Ruth R.N.  Outcome: Progressing  Goal: Patient will be able to express needs and understand communication  3/30/2023 0747 by Jasmine Ruth R.N.  Outcome: Progressing  3/30/2023 0343 by Jasmine Ruth R.N.  Outcome: Progressing     Problem: Dysphagia  Goal: Dysphagia will improve  3/30/2023 0747 by Jasmine Ruth R.N.  Outcome: Progressing  3/30/2023 0343 by Jasmine Ruth R.N.  Outcome: Progressing     Problem: Risk for Aspiration  Goal: Patient's risk for aspiration will be absent or decrease  3/30/2023 0747 by Jasmine Ruth R.N.  Outcome: Progressing  3/30/2023 0343 by Jasmine Ruth R.N.  Outcome: Progressing     Problem: Nutrition  Goal: Patient's nutritional and fluid intake will be adequate or improve  3/30/2023 0747 by Jasmine Ruth R.N.  Outcome: Progressing  3/30/2023 0343 by Jasmine Ruth R.N.  Outcome: Progressing  Goal: Enteral nutrition will be maintained or improve  3/30/2023 0747 by Jasmine Ruth R.N.  Outcome: Progressing  3/30/2023 0343 by Jasmine Ruth R.N.  Outcome: Progressing  Goal: Enteral nutrition will be maintained or improve  3/30/2023 0747 by Jasmine Ruth R.N.  Outcome: Progressing  3/30/2023 0343 by JULES FernandezN.  Outcome: Progressing     Problem: Urinary Elimination  Goal: Establish and maintain regular urinary output  3/30/2023 0747 by Jasmine Ruth R.N.  Outcome: Progressing  3/30/2023 0343 by Jasmine Ruth R.N.  Outcome: Progressing     Problem: Bowel Elimination  Goal: Establish and maintain regular bowel function  3/30/2023 0747 by Jasmine Ruth R.N.  Outcome: Progressing  3/30/2023 0343 by Jasmine Ruth R.N.  Outcome: Progressing     Problem: Gastrointestinal Irritability  Goal: Nausea and vomiting will be absent or improve  3/30/2023 0747 by Jasmine Ruth R.N.  Outcome: Progressing  3/30/2023 0343 by Jasmine Ruth,  MILLIE  Outcome: Progressing  Goal: Diarrhea will be absent or improved  3/30/2023 0747 by Jasmine Ruth R.N.  Outcome: Progressing  3/30/2023 0343 by Jasmine Ruth R.N.  Outcome: Progressing     Problem: Rectal Tube  Goal: Fecal output will be contained and skin will remain free from irritation  3/30/2023 0747 by Jasmine Ruth R.N.  Outcome: Progressing  3/30/2023 0343 by Jasmine Ruth R.N.  Outcome: Progressing     Problem: Mobility  Goal: Patient's capacity to carry out activities will improve  3/30/2023 0747 by Jasmine Ruth R.N.  Outcome: Progressing  3/30/2023 0343 by Jasmine Ruth R.N.  Outcome: Progressing     Problem: Self Care  Goal: Patient will have the ability to perform ADLs independently or with assistance (bathe, groom, dress, toilet and feed)  3/30/2023 0747 by Jasmine Ruth R.N.  Outcome: Progressing  3/30/2023 0343 by Jasmine Ruth R.N.  Outcome: Progressing     Problem: Infection - Standard  Goal: Patient will remain free from infection  3/30/2023 0747 by Jasmine Ruth R.N.  Outcome: Progressing  3/30/2023 0343 by Jasmine Ruth R.N.  Outcome: Progressing     Problem: Wound/ / Incision Healing  Goal: Patient's wound/surgical incision will decrease in size and heals properly  3/30/2023 0747 by Jasmine Ruth R.N.  Outcome: Progressing  3/30/2023 0343 by Jasmine Ruth R.N.  Outcome: Progressing     Problem: Safety - Medical Restraint  Goal: Remains free of injury from restraints (Restraint for Interference with Medical Device)  3/30/2023 0747 by Jasmine Ruth R.N.  Outcome: Progressing  3/30/2023 0343 by Jasmine Ruth R.N.  Outcome: Progressing  Goal: Free from restraint(s) (Restraint for Interference with Medical Device)  3/30/2023 0747 by Jasmine Ruth R.N.  Outcome: Progressing  3/30/2023 0343 by Jasmine Ruth R.N.  Outcome: Progressing       Patient is not progressing towards the following goals:

## 2023-03-30 NOTE — ASSESSMENT & PLAN NOTE
Discussed the patient's declining condition with his wife, options for care including comfort measures and continued aggressive care which may be unsuccessful, and if successful will likely lead to prolonged ventilation and 24/7 care. She remains hopeful and requests full treatment including HD.

## 2023-03-30 NOTE — PROGRESS NOTES
Nephrology Daily Progress Note    Date of Service  3/30/2023    Chief Complaint  57 y.o. male severe COPD, pulmonary HTN, admitted 3/21/2023 with respiratory failure -intubated/vent, consulted for worsening renal function, volume overload    Interval Problem Update  3/30 - intubated/vent on FiO2 90%  UOP improved with lasix -1800 cc/24 H  Creat level slightly worse  Review of Systems  Review of Systems   Unable to perform ROS: Intubated      Physical Exam  Pulse:  [] 99  Resp:  [25-41] 39  BP: ()/(41-75) 129/62  SpO2:  [86 %-99 %] 89 %    Physical Exam  Vitals and nursing note reviewed.   Constitutional:       General: He is not in acute distress.     Appearance: He is well-developed. He is obese. He is ill-appearing. He is not diaphoretic.      Interventions: He is sedated and intubated.   HENT:      Head: Normocephalic and atraumatic.      Nose: Nose normal.      Mouth/Throat:      Comments: ETT  Eyes:      Pupils: Pupils are equal, round, and reactive to light.   Cardiovascular:      Rate and Rhythm: Normal rate and regular rhythm.      Pulses: Normal pulses.      Heart sounds: Normal heart sounds.   Pulmonary:      Effort: Pulmonary effort is normal. No respiratory distress. He is intubated.      Breath sounds: Normal breath sounds. No wheezing or rales.   Abdominal:      General: Bowel sounds are normal. There is no distension.      Palpations: Abdomen is soft. There is no mass.      Tenderness: There is no abdominal tenderness.   Musculoskeletal:      Right lower leg: Edema present.      Left lower leg: Edema present.   Skin:     General: Skin is warm and dry.      Coloration: Skin is not pale.      Findings: No erythema or rash.   Neurological:      Comments: sedated       Fluids    Intake/Output Summary (Last 24 hours) at 3/30/2023 1114  Last data filed at 3/30/2023 1000  Gross per 24 hour   Intake 3014.8 ml   Output 1670 ml   Net 1344.8 ml       Laboratory  Recent Labs     03/28/23  0305  03/29/23  0430   WBC 25.2* 15.1*   RBC 5.48 4.72   HEMOGLOBIN 15.0 13.1*   HEMATOCRIT 48.4 41.2*   MCV 88.3 87.3   MCH 27.4 27.8   MCHC 31.0* 31.8*   RDW 47.0 47.4   PLATELETCT 231 167   MPV 10.5 10.3     Recent Labs     03/29/23  1840 03/30/23  0058 03/30/23  0744   SODIUM 141 138 139   POTASSIUM 4.5 4.4 4.5   CHLORIDE 106 100 101   CO2 20 22 23   GLUCOSE 161* 200* 167*   * 102* 108*   CREATININE 2.30* 2.45* 2.63*   CALCIUM 8.7 8.9 9.0         No results for input(s): NTPROBNP in the last 72 hours.  Recent Labs     03/29/23  0430 03/30/23  0058   TRIGLYCERIDE 184* 295*       Imaging  DX-CHEST-LIMITED (1 VIEW)   Final Result         1.  Pulmonary edema and/or infiltrates are identified, which are stable since the prior exam.   2.  Cardiomegaly      DX-CHEST-LIMITED (1 VIEW)   Final Result      Mildly improved aeration in the left lung with otherwise persistent diffuse bilateral pulmonary opacities.      DX-CHEST-PORTABLE (1 VIEW)   Final Result         1.  Pulmonary edema and/or infiltrates are identified, which are stable since the prior exam.   2.  Cardiomegaly      CT-CHEST (THORAX) W/O   Final Result      1.  Diffuse hazy parenchymal opacities, edema versus pneumonitis.   2.  Probable background pulmonary fibrosis.         Fleischner Society pulmonary nodule recommendations:   Not Applicable         DX-ABDOMEN FOR TUBE PLACEMENT   Final Result      Enteric tube tip projects over the stomach      DX-CHEST-LIMITED (1 VIEW)   Final Result         1.  Pulmonary edema and/or infiltrates are identified, which are stable since the prior exam.   2.  Cardiomegaly      DX-CHEST-PORTABLE (1 VIEW)   Final Result         1.  Diffuse pulmonary opacifications again identified.      2.  Lungs appear more expanded than on prior radiograph.      3.  Endotracheal tube and right central line are now noted.      DX-CHEST-LIMITED (1 VIEW)   Final Result      1.  Stable enlarged cardiac silhouette.   2.  Diffuse interstitial  and airspace opacities could represent combination of edema, multiple focal pneumonia or ARDS.      DX-CHEST-LIMITED (1 VIEW)   Final Result         Ill-defined opacifications in each lung have increased compared to the prior radiograph. This could indicate worsening of pulmonary edema or inflammation. Lungs are less expanded than on prior radiograph.      DX-CHEST-PORTABLE (1 VIEW)   Final Result         1.  Pulmonary edema and/or infiltrates are identified, which are stable since the prior exam.   2.  Cardiomegaly      DX-CHEST-PORTABLE (1 VIEW)   Final Result         1.  Pulmonary edema and/or infiltrates are identified, which are stable since the prior exam.   2.  Cardiomegaly      EC-ECHOCARDIOGRAM COMPLETE W/O CONT   Final Result      DX-CHEST-PORTABLE (1 VIEW)   Final Result      1.  Enlarged cardiac silhouette with diffuse bilateral interstitial and airspace opacities. This could be due to edema or multifocal pneumonia.      OUTSIDE IMAGES-DX CHEST   Final Result      US-FOREIGN FILM ULTRASOUND   Final Result      OUTSIDE IMAGES-CT CHEST   Final Result           Assessment/Plan    The patient is a 57-year-old male with multiple medical   problems, admitted with chronic obstructive pulmonary disease exacerbation in respiratory failure, now ventilator dependent, consulted for worsening creatinine level and hypervolemia.    1.  Acute kidney injury on chronic kidney disease stage II, likely from contrast-induced nephropathy as well as cardiac hemodynamics.   Creat level slightly worse  With worsening volume/respiratory status - initiated RRP    2.  Electrolytes: well controlled    3.  Volume. UF with HD as tolerates    4.  Hypertension.  Blood pressure remains well controlled.    5.  Anemia.  Hemoglobin stable.    6.  Chronic obstructive pulmonary disease exacerbation, ventilator-dependent respiratory failure - per primary team.     RECOMMENDATIONS:  Daily dialysis.   Continue to monitor BMP daily  Avoid IV  contrast.  Adjust all medications to renal doses.  Prognosis guarded  Above plan was discussed with Dr. Fidel Mcdonald.  We will follow the patient closely

## 2023-03-30 NOTE — PROCEDURES
Central Line Insertion    Date/Time: 3/30/2023 12:09 PM  Performed by: Fidel Mcdonald Jr., D.O.  Authorized by: Fidel Mcdonald Jr., D.O.     Consent:     Consent obtained:  Verbal    Consent given by:  Spouse  Pre-procedure details:     Hand hygiene: Hand hygiene performed prior to insertion      Sterile barrier technique: All elements of maximal sterile technique followed      Skin preparation:  2% chlorhexidine    Skin preparation agent: Skin preparation agent completely dried prior to procedure    Sedation:     Sedation type:  Deep  Procedure details:     Location:  L internal jugular    Site selection rationale:  Existing RIJ central line    Patient position:  Flat    Procedural supplies: power trialysis catheter 13 [place].    Landmarks identified: no      Ultrasound guidance: yes      Sterile ultrasound techniques: Sterile gel and sterile probe covers were used      Number of attempts:  2    Successful placement: yes    Post-procedure details:     Post-procedure:  Dressing applied and line sutured    Guidewire: guidewire removal confirmed      Assessment:  Blood return through all ports, no pneumothorax on x-ray, free fluid flow and placement verified by x-ray    Patient tolerance of procedure:  Tolerated well, no immediate complications  Comments:      Initial line placed did not aspirate blood easily, withdrawn, pressure held until hemastatic then 2nd line placed successfully

## 2023-03-30 NOTE — CARE PLAN
Problem: Pain - Standard  Goal: Alleviation of pain or a reduction in pain to the patient’s comfort goal  Outcome: Progressing     The patient is Unstable - High likelihood or risk of patient condition declining or worsening    Shift Goals  Clinical Goals: hemodynamic stability  Patient Goals: jakob  Family Goals: jakob    Progress made toward(s) clinical / shift goals:     Patient is not progressing towards the following goals:      Problem: Respiratory  Goal: Patient will achieve/maintain optimum respiratory ventilation and gas exchange  Outcome: Not Progressing  Flowsheets (Taken 3/29/2023 1922)  O2 Delivery Device: Ventilator  Note: Ventilator settings increased     Problem: Mechanical Ventilation  Goal: Successful weaning off mechanical ventilator, spontaneously maintains adequate gas exchange  Outcome: Not Progressing  Note: Ventilator settings increased     Problem: Bowel Elimination  Goal: Establish and maintain regular bowel function  Outcome: Not Progressing  Note: Bowel meds given per MAR. Last bowel movement 3/24

## 2023-03-30 NOTE — PROGRESS NOTES
Patient asynchronous with ventilator despite sedation given per MAR. Dr. Craig made aware. Rocuronium drip ordered.

## 2023-03-30 NOTE — PROGRESS NOTES
Pulmonary Progress Note    Date of admission  3/21/2023    Chief Complaint  57 y.o. male admitted 3/21/2023 with acute hypoxic respiratory failure    Hospital Course  57 y.o. male who presented 3/21/2023 with a history of HFpEF, HTN, and cerebral aneurysm about 10 years ago.  He reports he was admitted at that time for a while.  He smokes, previously a pack a day but quit 2 years ago.  He denies significant shortness of breath at baseline.  He previously worked as a  but has been officially on disability since his aneurysm 10 years ago.  He denies wheezing or chronic cough.  Recently he has had several courses of steroids for possible pneumonias. He presented to an OSH with acute hypoxic respiratory failure and underwent CT scan with diffuse GGO with areas of mosaicism essentially from top to bottom in the lungs with no geographic predominance. Of note, when he was admitted with his aneurysm he was intubated for 8 days with diffuse patchy GGO on CT which was considered to be aspiration pneumonia at the time but appears to have upper lobe involvement and a pattern more consistent with ILD or inflammation.  Here he has been managed on BIPAP but is in increasing respiratory distress.  His infiltrates are becoming more dense and appear consistent with ARDS.  His volumes are low potentially due to abdominal obesity and fatigue.       Aldolase is positive, CKMB negative.  CTD  panels pending     Significant family history of COPD and smoking but no other lung diseases per patient's mother.  No CTD history in the family either.  He cannot provide a clear history about muscle weakness or pain.  No birds at home or farm exposures.     Interval Problem Update  Reviewed last 24 hour events:  3/26: Intubated in am, plan for proning.  Myositis panel ordered.  3/27: HP1 and HP2 panels negative.  Still on 12 of PEEP and 70% FiO2.  No other significant changes overnight.  3/28: Proning. PEEP 10. FIO2 80%. No significant  changes.   3/29: No significant changes.  Transfer to [place] was declined because he is too obese for ECMO.  3/30: FIO2 and PEEP increased as patient is supined.     Review of Systems  Review of Systems   Unable to perform ROS: Intubated      Vital Signs for last 24 hours   Pulse:  [] 98  Resp:  [25-41] 40  BP: ()/(41-75) 129/62  SpO2:  [86 %-99 %] 88 %    Hemodynamic parameters for last 24 hours       Respiratory Information for the last 24 hours  Vent Mode: APVCMV  Rate (breaths/min): 40  Vt Target (mL): 370  PEEP/CPAP: 12  MAP: 18  Control VTE (exp VT): 377    Physical Exam   Physical Exam  Vitals reviewed. Exam conducted with a chaperone present.   Constitutional:       Appearance: He is ill-appearing.   HENT:      Head: Atraumatic.   Cardiovascular:      Rate and Rhythm: Normal rate and regular rhythm.   Pulmonary:      Breath sounds: No wheezing or rhonchi.   Abdominal:      Palpations: Abdomen is soft.   Musculoskeletal:         General: No swelling, tenderness or deformity.      Right lower leg: No edema.      Left lower leg: No edema.   Skin:     General: Skin is warm and dry.   Neurological:      Comments: Intubated and sedated       Medications  Current Facility-Administered Medications   Medication Dose Route Frequency Provider Last Rate Last Admin    polyethylene glycol-electrolytes (GOLYTELY) solution (Split Dose Protocol) 2 L  2 L Enteral Tube BID Fidel Mcdonald Jr., D.O.        methylPREDNISolone (SOLU-MEDROL) 40 MG injection 40 mg  40 mg Intravenous Q12HRS CLARK BallardOSimran        famotidine (PEPCID) tablet 20 mg  20 mg Enteral Tube DAILY Fidel Mcdonald Jr., D.O.   20 mg at 03/30/23 0627    Or    famotidine (PEPCID) injection 20 mg  20 mg Intravenous DAILY Fidel Mcdonald Jr., CHANG.O.        heparin injection 5,000 Units  5,000 Units Subcutaneous Q8HRS Fidel Mcdonald Jr., D.O.   5,000 Units at 03/30/23 0627    cefepime (Maxipime) 2 g in  mL IVPB  2 g Intravenous Q12HRS  Fidel Mcdonald Jr., D.O.   Stopped at 03/30/23 0721    acetylcysteine (MUCOMYST) 20 % solution 3 mL  3 mL Inhalation Q4HRS CHANG Huddleston Jr..JUNIOR   3 mL at 03/30/23 0638    furosemide (LASIX) injection 80 mg  80 mg Intravenous 4X/DAY CHANG Huddleston Jr..OSimran   80 mg at 03/30/23 0834    K+ Scale: Goal of 4.5  1 Each Intravenous Q6HRS CHANG Huddleston Jr..O.   1 Each at 03/29/23 1400    artificial tears (EYE LUBRICANT) ophth ointment 1 Application.  1 Application. Both Eyes Q8HRS Dee Craig M.D.   1 Application. at 03/30/23 0629    propofol (DIPRIVAN) injection  0-80 mcg/kg/min (Ideal) Intravenous Continuous Dee Craig M.D. 37.2 mL/hr at 03/30/23 0950 80 mcg/kg/min at 03/30/23 0950    fentaNYL (SUBLIMAZE) 50 mcg/mL in 50mL (Continuous Infusion)   Intravenous Continuous Dee Craig M.D. 12 mL/hr at 03/30/23 0701 600 mcg/hr at 03/30/23 0701    rocuronium (ZEMURON) 250 mg in  mL Infusion  0-16 mcg/kg/min (Ideal) Intravenous Continuous Dee Craig M.D. 27.9 mL/hr at 03/30/23 0949 6 mcg/kg/min at 03/30/23 0949    rocuronium (ZEMURON) injection 46.6 mg  0.6 mg/kg (Ideal) Intravenous Q2HRS PRN Dee Craig M.D.   46.6 mg at 03/30/23 0520    Pharmacy Consult: Enteral tube insertion - review meds/change route/product selection  1 Each Other PHARMACY TO DOSE Fidel Mcdonald Jr., D.O.        insulin regular (HumuLIN R) 100 Units in  mL Infusion  0-29 Units/hr Intravenous Continuous Fidel Mcdonald Jr., D.O. 8 mL/hr at 03/30/23 1000 8 Units/hr at 03/30/23 1000    dextrose 10 % BOLUS 12.5-25 g  12.5-25 g Intravenous PRN CLARK Huddleston Jr.OSimran        fentaNYL (SUBLIMAZE) injection  mcg   mcg Intravenous Q HOUR PRN Adrian Ludwig M.D.   100 mcg at 03/30/23 0110    senna-docusate (PERICOLACE or SENOKOT S) 8.6-50 MG per tablet 2 Tablet  2 Tablet Enteral Tube BID Vince Daniels D.O.   2 Tablet at 03/30/23 0627    And    polyethylene glycol/lytes (MIRALAX) PACKET 1 Packet  1  Packet Enteral Tube QDAY PRN Vince Daniels D.O.   1 Packet at 03/28/23 0822    And    magnesium hydroxide (MILK OF MAGNESIA) suspension 30 mL  30 mL Enteral Tube QDAY PRN Vince Daniels D.O.        And    bisacodyl (DULCOLAX) suppository 10 mg  10 mg Rectal QDAY PRN Vince Daniels D.O.        sertraline (Zoloft) tablet 100 mg  100 mg Enteral Tube DAILY Vince Daniels D.O.   100 mg at 03/30/23 0626    acetaminophen (TYLENOL) tablet 1,000 mg  1,000 mg Enteral Tube Q6HRS PRN Vince Daniels D.O.        rosuvastatin (CRESTOR) tablet 20 mg  20 mg Enteral Tube Q EVENING Vince Daniels D.O.   20 mg at 03/29/23 1719    Respiratory Therapy Consult   Nebulization Continuous RT Vince Daniels D.O. MD Alert...ICU Electrolyte Replacement per Pharmacy   Other PHARMACY TO DOSE Vince Daniels D.O.        lidocaine (XYLOCAINE) 1 % injection 2 mL  2 mL Tracheal Tube Q30 MIN PRN Vince Daniels D.O.        norepinephrine (Levophed) 8 mg in 250 mL NS infusion (premix)  0-1 mcg/kg/min (Ideal) Intravenous Continuous Vince Daniels D.O. 5.8 mL/hr at 03/30/23 0900 0.04 mcg/kg/min at 03/30/23 0900    doxycycline (VIBRAMYCIN) 100 mg in  mL IVPB  100 mg Intravenous Q12HRS Vince Daniels D.O.   Stopped at 03/30/23 0734    ipratropium-albuterol (DUONEB) nebulizer solution  3 mL Nebulization Q4H PRN (RT) Chino Mendoza M.D.   3 mL at 03/21/23 2338    [Held by provider] fluticasone (FLOVENT HFA) 44 MCG/ACT inhaler 88 mcg  2 Puff Inhalation QDAILY (RT) Chino Mendoza M.D.   88 mcg at 03/25/23 0718    [Held by provider] umeclidinium-vilanterol (Anoro Ellipta) inhaler 1 Puff  1 Puff Inhalation QDAILY (RT) Chino Mendoza M.D.   1 Puff at 03/25/23 0718    ipratropium-albuterol (DUONEB) nebulizer solution  3 mL Nebulization Q4HRS (RT) Brennan Martin M.D.   3 mL at 03/30/23 0636       Fluids    Intake/Output Summary (Last 24 hours) at 3/30/2023 1043  Last data filed at 3/30/2023 1000  Gross per 24 hour   Intake 3014.8 ml   Output 1700 ml   Net 1314.8 ml          Laboratory  Recent Labs     03/29/23  2305 03/30/23  0047 03/30/23  0426 03/30/23  0655   ISTATAPH 7.174* 7.198* 7.145* 7.155*   ISTATAPCO2 75.1* 68.9* 80.9* 76.3*   ISTATAPO2 107* 90* 112* 83   ISTATATCO2 30 29 30 29   EKEZOKE9TEG 96 94 96 92*   ISTATARTHCO3 27.6* 26.8* 27.8* 26.9*   ISTATARTBE -3 -3 -4 -4   ISTATTEMP 36.5 C 36.5 C 36.2 C see below   ISTATFIO2 100 100 100 80   ISTATSPEC Arterial Arterial Arterial Arterial   ISTATAPHTC 7.180* 7.205* 7.155*  --    GCMFIRUZ1FO 104* 88* 107*  --            Recent Labs     03/28/23  0305 03/29/23  0430 03/29/23  1437 03/29/23 1840 03/30/23 0058 03/30/23  0744   SODIUM 138 138   < > 141 138 139   POTASSIUM 4.7 4.4   < > 4.5 4.4 4.5   CHLORIDE 100 103   < > 106 100 101   CO2 26 22   < > 20 22 23   BUN 69* 91*   < > 100* 102* 108*   CREATININE 1.40 2.55*   < > 2.30* 2.45* 2.63*   MAGNESIUM 2.7* 2.7*  --   --   --   --    PHOSPHORUS 5.2* 5.3*  --   --   --   --    CALCIUM 9.3 9.0   < > 8.7 8.9 9.0    < > = values in this interval not displayed.       Recent Labs     03/27/23  1100 03/28/23  0305 03/29/23  0430 03/29/23  1840 03/30/23  0058 03/30/23  0744   ALTSGPT  --   --   --   --  31 32   ASTSGOT  --   --   --   --  29 27   ALKPHOSPHAT  --   --   --   --  80 81   TBILIRUBIN  --   --   --   --  0.2 0.2   PREALBUMIN 26.2 25.6  --   --   --   --    GLUCOSE  --  195*   < > 161* 200* 167*    < > = values in this interval not displayed.       Recent Labs     03/28/23 0305 03/29/23  0430 03/30/23  0058 03/30/23  0744   WBC 25.2* 15.1*  --   --    NEUTSPOLYS 93.70* 92.00*  --   --    LYMPHOCYTES 1.50* 2.80*  --   --    MONOCYTES 3.40 3.80  --   --    EOSINOPHILS 0.00 0.00  --   --    BASOPHILS 0.10 0.10  --   --    ASTSGOT  --   --  29 27   ALTSGPT  --   --  31 32   ALKPHOSPHAT  --   --  80 81   TBILIRUBIN  --   --  0.2 0.2       Recent Labs     03/28/23 0305 03/29/23 0430   RBC 5.48 4.72   HEMOGLOBIN 15.0 13.1*   HEMATOCRIT 48.4 41.2*   PLATELETCT 231 167        -  ANCA - MPO & PR3 negative   - THELMA negative   - CTD panel negative  - HP panels negative; this does not exclude HP from the differential.   - Aldolase positive   - ESR/CRP elevated   - CPK normal   - Sputum DFA negative   - Culture with PSA + growth   - BAL 3/26 - PMN predominant c/w acute PNA. Difficult to interpret as was already on steroids which may suppress a lymphocytic pleiotropy    - Sent extended myositis panel under misc on 3/26 - [facility] [id number]    Imaging  X-Ray:  I have personally reviewed the images and compared with prior images.  CT:    Reviewed  Echo:   Reviewed    Assessment/Plan  #Acute hypoxic respiratory failure  #Diffuse GGO on CT -   #PSA pneumonia with ARDS  Plan:  I personally reviewed CT scans from 2013 as well as now.  The patient has a history of mid & upper lung findings dating back to 2013.  He was also intubated in 2013.  Interesting that he is worked as a .  Differential diagnosis includes hypersensitivity pneumonitis versus sarcoidosis versus pneumoconiosis.  I discussed with the team on 3/27.      I recommend pulsing with Solu-Medrol 250 mg every 6 hours for 48 to 72 hours and monitoring response.     This did not make much of a difference, although it may have halted progression. Leading diagnosis at this point is a fibrotic syndrome such as diffuse alveolar damage likely 2/2 ARDS from pneumonia.     - Diurese as much as possible; appears to be dropping off somewhat   -completed 3 days of Solumedrol 250 mg q6h. Weaned down to 40 mg q12h today.   -can consider off-label use of a tyrosine kinase inhibitor such as Nintedanib if progression suspected  -prognosis guarded. Consider palliative conversation with family for GOC    Total consult time: 40 minutes which included time spent on chart review, personally reviewing pertinent images and labs, time spent counseling and educating the patient and/or family members, and coordinating care with the healthcare team to include  consultants.     You oLredo,   Staff Pulmonologist and Intensivist  [facility]     Please note that this dictation was created using voice recognition software. The accuracy of the dictation is limited to the abilities of the software. I have made every reasonable attempt to correct obvious errors, but I expect that there are errors of grammar and possibly content that I did not discover before finalizing the note.

## 2023-03-30 NOTE — PROGRESS NOTES
"[facility] Progress Note    Admit Date: 3/21/2023    Resident(s): Hemal Tariq D.O.   Attending:  CARTER WELCH/ Dr. Mcdonald    Patient ID:    Name:  Josse Valle   YOB: 1965  Age:  57 y.o.  male   MRN:  [id number]    Chief Complaint  Acute Hypoxic Respiratory Failure    Hospital Course (carried forward and updated):  \"57-year-old male, PMH ruptured SAH 2013, disabled since, HTN, obesity, prior smoker quit 2 years ago, COPD not usually oxygen dependent, recently admitted at [facility] 3/9 - 3/12 with presumed COPD exacerbation and pneumonia, treated with steroids and antibiotics, discharged home on 6 L oxygen.  After completed steroids he began feeling much worse, readmitted 3/19 with increasing respiratory distress, requiring high flow nasal cannula alternating with BiPAP and high FiO2.  CT scan of the chest 3/21 showed no evidence of pulm embolism, significant bilateral groundglass and reticular infiltrates throughout all lung fields.  He is started back on IV corticosteroids, broad-spectrum antibiotics for possible HCAP with cefepime and vancomycin and transferred to [facility] for further evaluation.  Further work-up there included a high CRP of 261, ESR 65, WBC 23 and no peripheral eosinophilia.     Patient worked as a  until his ruptured SAH in 2013 and has been on disability since.  He does not have any pussycats or birdies but does have a little puppy chiwennie.  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  He denies any recreational/illicit drug use.  He had not required supplemental oxygen until his recent hospitalization.\" - Dr. Martin's note    3/21 - admitted to ICU.  3/22 - s/p solumedrol 500mg IV x1, followed with 80 Q8H  3/22 - lasix 40 Q12H   3/25 - unasyn changed to cefepime due to Pseudomonas on sputum culture  3/26 - Intubated  3/27 - VD #2, APV-CMV, RR 32, PEEP 10,  ; " started high dose solumedrol 80->250 q6h, Insulin GTT, stopped paralytics  3/28 - VD #3, not responding to steroids, reached out to transfer centers for ECMO  3/29 - VD #4, APV-CMV, RR 32->34, PEEP 10->12, ->400, FiO2 70%->80%->90%, not a candidate for ECMO w/ BMI 40s      Consultants:  Critical Care  Pulmonology    Interval Events:    WBC 25.2->15.1  Cr 0.71 -->2.55-->2.45  Triglycerides 295  Sputum culture (3/23/23): + for pseudomonas koreensis, pan sensitivity, C3  Bcx 3/26 NGTD     Vent Asynchrony, started rocuronium overnight    Reviewed last 24 hour events:  Neuro: RASS -5, nor corneal, cough, or gag  HR: [] 105;  ST  SBP: BP: ()/(41-73) 129/58; levophed 0.02 mcg/kg/min  Tmax: AF  GI: 3/24/23, NG tube feeding  I/O: +2414/-1800/+614, -5149 since admit  Lines: CVC, NG tube, L radial art line, trejo  Mobility: Level 1  Resp: VD #5, APV-CMV, RR 34->38, PEEP 12->10, ->370, FiO2 80%, duoneb q4h, methylprednisolone 250 mg every 6 hours  AB.251/56.3/80 --> 7.155/76.3/83  CXR: pulmonary edema and/or infiltrates, stable  Vte: Heparin  PPI/H2: H2  Antibx: Cefepime (3/25-), Doxy (3/26-)  Continuous Meds: fentanyl 500 mcg/hr--> 600 mcg/hr, propofol 80 mcg/kg/min, rocuronium 6 mcg/kg/min, levophed 0.1 mcg/kg/min, Insulin 8 units/hr    -Increasing CO2 retention, ventilator settings optimized, will continue to monitor  -Day 6 of Cefepime + Day 5 of Doxy, plan for 7 days  -Increase bowel regime, 2L PEG BID  -Continue diuresis w/ Lasix 80 mg QID for HAYES 2/2 contrast induced nephropathy, nephrology consult today for possible iHD or CRRT  -Down-titrate methylprednisolone 250 mg every 6 hours, will consult pulmonology   -Palliative consult    Yesterday    WBC 25.2->15.1  Cr 0.71 --> 1.41-->1.40-> 2.55  ABG 7.28/62.2/120 --> 7.269/52.1/89  CXR w/ pulmonary edema and/or infiltrates, stable  EKG SR,   Sputum culture (3/23/23): + for pseudomonas koreensis, pan sensitivity, C3  Bcx 3/26 NGTD   CXR w/  mildly improved aeration in the left lung  *Fought vent, s/p rocuronium 50 mg, fentanyl 400 to 500 mcg/hr    IGE serum (3/25): 184, pneumocytitis DFA 3/26: Negative pending, fungal culture (3/26): Negative  Extended myositis panel (3/26):  pending      Reviewed last 24 hour events:  Neuro: RASS -5, nor corneal, cough, or gag  HR: [54-88] 62 ;  SR  SBP: ()/(43-72) 133/67 ; levophed 0.02 mcg/kg/min  Tmax: AF  GI: Last BM 3/28, large stool, NG tube feeding  I/O: +2351/-185/+2166; -5121 since admit  Lines: CVC, NG tube, L radial art line, trejo  Mobility: Level 1  Resp: VD #4, APV-CMV, RR 32->34, PEEP 10->12, ->400, FiO2 70%->80%->90%, duoneb q4h, methylprednisolone 250 mg every 6 hours  Vte: Heparin  PPI/H2: H2  Antibx: Cefepime (3/25-), Doxy (3/26-)  Continuous Meds: fentanyl 500 mcg/hr, propofol 80 mcg/kg/min, levophed 0.02 mcg/kg/min, Insulin 6.5 units/hr    -HAYES, 185 ml in 24 hr, nephrology consulted  -Lovenox to heparin for HAYES  -Day 5 of Cefepime + Day 4 of Doxy, likely continue Cefepime for 7 days given acuity of condition  -No Bactrim due to worsening kidney function and negative PCP DFA  -Not a candidate to transfer for ECMO w/ Corley Lung Score: 3.3 due to BMI 40.59  -Bronchoscopy today due to increasing PEEP up to 40s  -Pulmonology following, appreciate recommendations        Review of Systems  Review of Systems   Unable to perform ROS: Intubated   Cardiovascular:  Negative for orthopnea.     Vital Signs for the last 24 hours  Pulse:  [] 96  Resp:  [25-41] 40  BP: ()/(41-75) 134/59  SpO2:  [86 %-99 %] 88 %    Hemodynamic parameters for the last 24 hours       Vent Settings for the last 24 hours  Vent Mode: APVCMV  Rate (breaths/min): 40  Vt Target (mL): 370  PEEP/CPAP: 12  MAP: 18  Control VTE (exp VT): 377    Physical Exam  Physical Exam  Constitutional:       Appearance: He is ill-appearing.      Comments: Generalized edema   HENT:      Head: Atraumatic.      Mouth/Throat:       Mouth: Mucous membranes are moist.   Eyes:      Extraocular Movements: Extraocular movements intact.   Cardiovascular:      Rate and Rhythm: Normal rate and regular rhythm.      Comments: RIJ in place, left arterial line in place  Pulmonary:      Comments: Mechanical breath sounds  Abdominal:      Palpations: Abdomen is soft.   Genitourinary:     Comments: Cole in place  Musculoskeletal:         General: No swelling, tenderness or deformity.      Right lower leg: Edema present.      Left lower leg: Edema present.   Skin:     General: Skin is warm and dry.   Neurological:      Comments: Intubated and sedated   Psychiatric:      Comments: Unable to assess       Medications  Current Facility-Administered Medications   Medication Dose Route Frequency Provider Last Rate Last Admin    polyethylene glycol-electrolytes (GOLYTELY) solution (Split Dose Protocol) 2 L  2 L Enteral Tube BID Fidel Mcdonald Jr., D.O.        famotidine (PEPCID) tablet 20 mg  20 mg Enteral Tube DAILY Fidel Mcdonald Jr. D.O.   20 mg at 03/30/23 0627    Or    famotidine (PEPCID) injection 20 mg  20 mg Intravenous DAILY Fidel Mcdonald Jr., D.O.        heparin injection 5,000 Units  5,000 Units Subcutaneous Q8HRS Fidel Mcdonald Jr., D.O.   5,000 Units at 03/30/23 0627    cefepime (Maxipime) 2 g in  mL IVPB  2 g Intravenous Q12HRS Fidel Mcdonald Jr., D.O.   Stopped at 03/30/23 0721    acetylcysteine (MUCOMYST) 20 % solution 3 mL  3 mL Inhalation Q4HRS Fidel Mcdonald Jr. D.O.   3 mL at 03/30/23 0638    furosemide (LASIX) injection 80 mg  80 mg Intravenous 4X/DAY Fidel Mcdonald Jr., D.O.   80 mg at 03/30/23 0834    K+ Scale: Goal of 4.5  1 Each Intravenous Q6HRS Fidel Mcdonald Jr. D.O.   1 Each at 03/29/23 1400    artificial tears (EYE LUBRICANT) ophth ointment 1 Application.  1 Application. Both Eyes Q8HRS Dee Craig M.D.   1 Application. at 03/30/23 0629    propofol (DIPRIVAN) injection  0-80 mcg/kg/min (Ideal) Intravenous  Continuous Dee Craig M.D. 37.2 mL/hr at 03/30/23 0950 80 mcg/kg/min at 03/30/23 0950    fentaNYL (SUBLIMAZE) 50 mcg/mL in 50mL (Continuous Infusion)   Intravenous Continuous Dee Craig M.D. 12 mL/hr at 03/30/23 0701 600 mcg/hr at 03/30/23 0701    rocuronium (ZEMURON) 250 mg in  mL Infusion  0-16 mcg/kg/min (Ideal) Intravenous Continuous Dee Craig M.D. 27.9 mL/hr at 03/30/23 0949 6 mcg/kg/min at 03/30/23 0949    rocuronium (ZEMURON) injection 46.6 mg  0.6 mg/kg (Ideal) Intravenous Q2HRS PRN Dee Craig M.D.   46.6 mg at 03/30/23 0520    Pharmacy Consult: Enteral tube insertion - review meds/change route/product selection  1 Each Other PHARMACY TO DOSE Fidel Mcdonald Jr., D.O.        methylPREDNISolone sod succ (Solu-Medrol) 250 mg in  mL IVPB  250 mg Intravenous Q6HRS Fidel Mcdonald Jr., D.O.   Stopped at 03/30/23 0924    insulin regular (HumuLIN R) 100 Units in  mL Infusion  0-29 Units/hr Intravenous Continuous Fidel Mcdonald Jr., D.O. 8 mL/hr at 03/30/23 0829 8 Units/hr at 03/30/23 0829    dextrose 10 % BOLUS 12.5-25 g  12.5-25 g Intravenous PRN Fidel Mcdonald Jr., D.O.        fentaNYL (SUBLIMAZE) injection  mcg   mcg Intravenous Q HOUR PRN Adrian Ludwig M.D.   100 mcg at 03/30/23 0110    senna-docusate (PERICOLACE or SENOKOT S) 8.6-50 MG per tablet 2 Tablet  2 Tablet Enteral Tube BID Vince Daniels D.O.   2 Tablet at 03/30/23 0627    And    polyethylene glycol/lytes (MIRALAX) PACKET 1 Packet  1 Packet Enteral Tube QDAY PRN Vince Daniels D.O.   1 Packet at 03/28/23 0822    And    magnesium hydroxide (MILK OF MAGNESIA) suspension 30 mL  30 mL Enteral Tube QDAY PRN Vince Daniels D.O.        And    bisacodyl (DULCOLAX) suppository 10 mg  10 mg Rectal QDAY PRN CLARK PinoO.        sertraline (Zoloft) tablet 100 mg  100 mg Enteral Tube DAILY CLARK PinoOSimran   100 mg at 03/30/23 0626    acetaminophen (TYLENOL) tablet 1,000 mg  1,000 mg Enteral Tube Q6HRS PRN  Vince Daniels D.O.        rosuvastatin (CRESTOR) tablet 20 mg  20 mg Enteral Tube Q EVENING Vince Daniels D.O.   20 mg at 03/29/23 1719    Respiratory Therapy Consult   Nebulization Continuous RT Vince Daniels D.O.        MD Alert...ICU Electrolyte Replacement per Pharmacy   Other PHARMACY TO DOSE Vince Daniels D.O.        lidocaine (XYLOCAINE) 1 % injection 2 mL  2 mL Tracheal Tube Q30 MIN PRN Vince Daniels D.O.        norepinephrine (Levophed) 8 mg in 250 mL NS infusion (premix)  0-1 mcg/kg/min (Ideal) Intravenous Continuous Vince Daniels D.O. 5.8 mL/hr at 03/30/23 0900 0.04 mcg/kg/min at 03/30/23 0900    doxycycline (VIBRAMYCIN) 100 mg in  mL IVPB  100 mg Intravenous Q12HRS Vince Daniels D.O.   Stopped at 03/30/23 0734    ipratropium-albuterol (DUONEB) nebulizer solution  3 mL Nebulization Q4H PRN (RT) Chino Mendoza M.D.   3 mL at 03/21/23 2338    [Held by provider] fluticasone (FLOVENT HFA) 44 MCG/ACT inhaler 88 mcg  2 Puff Inhalation QDAILY (RT) Chino Mendoza M.D.   88 mcg at 03/25/23 0718    [Held by provider] umeclidinium-vilanterol (Anoro Ellipta) inhaler 1 Puff  1 Puff Inhalation QDAILANGELES (RT) Chino Mendoza M.D.   1 Puff at 03/25/23 0718    ipratropium-albuterol (DUONEB) nebulizer solution  3 mL Nebulization Q4HRS (RT) Brennan Martin M.D.   3 mL at 03/30/23 0636       Fluids    Intake/Output Summary (Last 24 hours) at 3/30/2023 1002  Last data filed at 3/30/2023 0900  Gross per 24 hour   Intake 2737.65 ml   Output 1500 ml   Net 1237.65 ml       Laboratory  Recent Labs     03/29/23  2305 03/30/23  0047 03/30/23  0426 03/30/23  0655   ISTATAPH 7.174* 7.198* 7.145* 7.155*   ISTATAPCO2 75.1* 68.9* 80.9* 76.3*   ISTATAPO2 107* 90* 112* 83   ISTATATCO2 30 29 30 29   FQGXWFX1QUB 96 94 96 92*   ISTATARTHCO3 27.6* 26.8* 27.8* 26.9*   ISTATARTBE -3 -3 -4 -4   ISTATTEMP 36.5 C 36.5 C 36.2 C see below   ISTATFIO2 100 100 100 80   ISTATSPEC Arterial Arterial Arterial Arterial   ISTATAPHTC 7.180* 7.205* 7.155*  --     WIGRXSLL8RH 104* 88* 107*  --      Recent Labs     03/28/23  0305 03/29/23 0430 03/29/23  1437 03/29/23 1840 03/30/23 0058 03/30/23 0744   SODIUM 138 138   < > 141 138 139   POTASSIUM 4.7 4.4   < > 4.5 4.4 4.5   CHLORIDE 100 103   < > 106 100 101   CO2 26 22   < > 20 22 23   BUN 69* 91*   < > 100* 102* 108*   CREATININE 1.40 2.55*   < > 2.30* 2.45* 2.63*   MAGNESIUM 2.7* 2.7*  --   --   --   --    PHOSPHORUS 5.2* 5.3*  --   --   --   --    CALCIUM 9.3 9.0   < > 8.7 8.9 9.0    < > = values in this interval not displayed.     Recent Labs     03/27/23  1100 03/28/23 0305 03/29/23 0430 03/29/23 1840 03/30/23 0058 03/30/23 0744   ALTSGPT  --   --   --   --  31 32   ASTSGOT  --   --   --   --  29 27   ALKPHOSPHAT  --   --   --   --  80 81   TBILIRUBIN  --   --   --   --  0.2 0.2   PREALBUMIN 26.2 25.6  --   --   --   --    GLUCOSE  --  195*   < > 161* 200* 167*    < > = values in this interval not displayed.     Recent Labs     03/28/23 0305 03/29/23 0430 03/30/23 0058 03/30/23 0744   WBC 25.2* 15.1*  --   --    NEUTSPOLYS 93.70* 92.00*  --   --    LYMPHOCYTES 1.50* 2.80*  --   --    MONOCYTES 3.40 3.80  --   --    EOSINOPHILS 0.00 0.00  --   --    BASOPHILS 0.10 0.10  --   --    ASTSGOT  --   --  29 27   ALTSGPT  --   --  31 32   ALKPHOSPHAT  --   --  80 81   TBILIRUBIN  --   --  0.2 0.2     Recent Labs     03/28/23 0305 03/29/23 0430   RBC 5.48 4.72   HEMOGLOBIN 15.0 13.1*   HEMATOCRIT 48.4 41.2*   PLATELETCT 231 167       Imaging  X-Ray:  I have personally reviewed the images and compared with prior images.  EKG:  I have personally reviewed the images and compared with prior images.  CT:    Reviewed  Echo:   Reviewed    ASSESSEMENT and PLAN:    * ARDS (adult respiratory distress syndrome) (HCC)  Assessment & Plan  *Intubated 3/26, pending work-up for cause as below for acute respiratory failure with hypoxia  *PaO2/FiO2 ratio 137 mm Hg, Moderate ARDS    -Management as below for acute respiratory failure  with hypoxia  -Increasing CO2 retention, ventilator settings optimized, will continue to monitor  -Goal sat >88%, paO2 >55, Pplat <30  -Serial ABGs  -CXR to monitor lung volumes and tube/line placement   -VAP bundle prevention, oral care, post pyloric feeding   -Head of bed > 30 degree   -GI prophylaxis   -Daily awakening and SBT trials unless contraindicated   -Monitor for liberation   -Respiratory treatments: prn       Acute respiratory failure with hypoxemia (HCC)- (present on admission)  Assessment & Plan  *Due to severe ARDS due to concern of suspected ILD, unclear etiology   *ANCA, MPO & PR3 negative, THELMA negative, CTD panel negative,  HP panel negative, Coccidiodes antibodies negative, resp viral panel negative, legionella and strep pneumo negative, pneumocystitis negative, IGE serum WNL, fungal culture negative, PCP DFA negative,  Aspergillus negative, Sputum DFA negative, Culture with PSA + growth, BAL 3/26 - white, clear, polys 85%, lymph 4%  *CT chest w/o (3/27/23):  w/ diffuse hazy parenchymal opacities, vs pneumonitis, probable background pulmonary fibrosis  *The differential is very broad from PSA associated ARDS to possible myositis associated ILD and many other possibilities in between including PCP, HP, NSIP and     -Extended myositis panel under misc on 3/26 - [facility]UP [id number] pending   -ARDS management as stated above  -Will hold off for flolan at this time and reassess   -Continue with proning 16 hr + supine 8 hr  -Tube feeding (3/27-)  -Stop paralytics, keep RAAS -5  -Increasing CO2 retention, ventilator settings optimized, will continue to monitor  -Day 6 of Cefepime + Day 5 of Doxy, plan for 7 days  -Increase bowel regime, 2L PEG BID  -Continue diuresis w/ Lasix 80 mg QID for HAYES 2/2 contrast induced nephropathy, nephrology consult today for possible iHD or CRRT  -Down-titrate methylprednisolone 250 mg every 6 hours, will consult pulmonology   -Palliative consult  -Not a candidate to transfer  for ECMO w/ Corley Lung Score: 3.3 due to BMI 40.59  -Pulmonology following, appreciate recommendations    Hyperglycemia  Assessment & Plan  *Likely 2/2 steroid use    -Insulin GTT, goal of 120-180    Acute kidney injury (HCC)  Assessment & Plan  *Cr 0.71 -->2.55-->2.45  *2/2 contrast induced nephropathy    -Furosemide 80 mg QID  -Nephrology consult today for possible iHD or CRRT    Pseudomonas pneumonia (HCC)  Assessment & Plan  *Sputum culture (3/23/23): + for pseudomonas koreensis, pan sensitivity, C3  *Bcx 3/26 NGTD     -Continue Cefepime x 7 days    Severe pulmonary hypertension (HCC)  Assessment & Plan  *This is evident on echo done on 03/22/2023 showing RVSP of 65-70 with an EF of 55%.  Also showing moderately dilated right ventricle.  *This is likely secondary to not only sleep apnea but also  highly suspect that the ILD    -Continue furosemide and steroid  -Hold off on Flolan for now    (HFpEF) heart failure with preserved ejection fraction (HCC)  Assessment & Plan  *Acute on chronic, BNP 2085, some pleural edema noted on CXR   *Echo (3/22/23): EF 55%, RSVP 65-70 mmHg, moderately dilated right ventricle    -Daily weight  -Daily I's and O's  -Currently on diuretics  -Hold home metoprolol and benazepril    Suspected sleep apnea  Assessment & Plan  STOP BANG of 7  Will need sleep study after discharge    Obesity  Assessment & Plan  *BMI 40.59    -Discuss about weight management and nutrition outpatient    COPD (chronic obstructive pulmonary disease) ([facility])  Assessment & Plan    -Hold home inhalers given severity of condition    HTN (hypertension)  Assessment & Plan    -Hold antihypertensives 2/2 acuity of condition    Mood disorder ([facility])  Assessment & Plan  Cont home sertraline    Hyperlipidemia  Assessment & Plan  Cont home statin    Subarachnoid hemorrhage from anterior communicating artery ([facility])- (present on admission)  Assessment & Plan  *History of in 2013        VTE:  Heparin  Ulcer: H2 Antagonist  Lines:  Central Line  Ongoing indication addressed, Arterial Line  Ongoing indication addressed, and Cole Catheter  Ongoing indication addressed    I have performed a physical exam and reviewed and updated ROS and Plan today (3/30/2023). In review of yesterday's note (3/29/2023), there are no changes except as documented above.     Discussed patient condition and risk of morbidity and/or mortality with RN, RT, Therapies, Pharmacy, , and Charge nurse / hot rounds      Hemal Tariq D.O.  PGY-2 Internal Medicine Resident

## 2023-03-31 PROBLEM — J84.9 ILD (INTERSTITIAL LUNG DISEASE) (HCC): Status: RESOLVED | Noted: 2023-01-01 | Resolved: 2023-01-01

## 2023-03-31 NOTE — DISCHARGE PLANNING
Case Management Discharge Planning    Admission Date: 3/21/2023  GMLOS: 13.8  ALOS: 10    6-Clicks ADL Score: 18  6-Clicks Mobility Score: 14  PT and/or OT Eval ordered: No  Post-acute Referrals Ordered: No  Post-acute Choice Obtained: No  Has referral(s) been sent to post-acute provider:  No      Anticipated Discharge Dispo: Discharge Disposition: Discharged to home/self care (01)  Discharge Address: [address] [place] [address] [place] [address]  Discharge Contact Phone Number: [phone] (spouse Paula Valle)    DME Needed: No    Action(s) Taken: n/a    Escalations Completed: n/a    Medically Clear: No    Next Steps: n/a    Barriers to Discharge: Medical clearance    Is the patient up for discharge tomorrow: No    Patient with severe COPD, pulmonary HTN, VDRF, volume overloaded, and HAYES. Pt on daily HD. Patient Is not medically ready. GOC conversation on 3/30/23. Patient remains a full code.    Faith LIMON RN Case Manager  [phone]

## 2023-03-31 NOTE — CARE PLAN
Problem: Bronchoconstriction  Goal: Improve in air movement and diminished wheezing  Description: Target End Date:  2 to 3 days    1.  Implement inhaled treatments  2.  Evaluate and manage medication effects  Outcome: Progressing    Respiratory Update    Treatment modality: Duoneb  Frequency: Q4    Pt tolerating current treatments well with no adverse reactions.       Problem: Ventilation  Goal: Ability to achieve and maintain unassisted ventilation or tolerate decreased levels of ventilator support  Description: Target End Date:  4 days     Document on Vent flowsheet    1.  Support and monitor invasive and noninvasive mechanical ventilation  2.  Monitor ventilator weaning response  3.  Perform ventilator associated pneumonia prevention interventions  4.  Manage ventilation therapy by monitoring diagnostic test results  Outcome: Progressing    Respiratory Update      Ventilator Daily Summary    Vent Day # 5    ETT: 8 26    Ventilator settings: CMV 40 370 10 90%        Plan: Continue current ventilator settings and wean mechanical ventilation as tolerated per physician orders.        Pt tolerating current treatments well with no adverse reactions.       Problem: Bronchopulmonary Hygiene  Goal: Increase mobilization of retained secretions  Description: Target End Date:  2 to 3 days    1.  Perform bronchopulmonary therapy as indicated by assessment  2.  Perform airway suctioning  3.  Perform actions to maintain patient airway  Outcome: Progressing    Respiratory Update    Treatment modality: mucomyst  Frequency: Q4    Pt tolerating current treatments well with no adverse reactions.

## 2023-03-31 NOTE — CARE PLAN
The patient is Unstable - High likelihood or risk of patient condition declining or worsening    Shift Goals  Clinical Goals: Decrease O2 demands  Patient Goals: ANTIONE  Family Goals: ANTIONE    Progress made toward(s) clinical / shift goals:    Patient is not progressing towards the following goals:      Problem: Respiratory  Goal: Patient will achieve/maintain optimum respiratory ventilation and gas exchange  Outcome: Not Progressing     Problem: Mechanical Ventilation  Goal: Safe management of artificial airway and ventilation  Outcome: Not Progressing  Goal: Successful weaning off mechanical ventilator, spontaneously maintains adequate gas exchange  Outcome: Not Progressing     Problem: Bowel Elimination  Goal: Establish and maintain regular bowel function  Outcome: Not Progressing  Note: Last BM 3/24     Problem: Pain - Standard  Goal: Alleviation of pain or a reduction in pain to the patient’s comfort goal  Outcome: Progressing

## 2023-03-31 NOTE — CARE PLAN
Problem: Skin Integrity  Goal: Skin integrity is maintained or improved  Outcome: Progressing     Problem: Fall Risk  Goal: Patient will remain free from falls  Outcome: Progressing     Problem: Hemodynamics  Goal: Patient's hemodynamics, fluid balance and neurologic status will be stable or improve  Outcome: Progressing   The patient is Watcher - Medium risk of patient condition declining or worsening    Shift Goals  Clinical Goals:  (Prone, monitor TOF, titrate medications per hemodynamic ranges, decrease O2 need)  Patient Goals: ANTIONE  Family Goals: ANTIONE    Progress made toward(s) clinical / shift goals:  goals- hemodynamically stable, 3L off dialysis this am    Patient is not progressing towards the following goals:

## 2023-03-31 NOTE — DIETARY
Nutrition support brief update:  Day 10 of admit.  Josse Valle is a 57 y.o. male with admitting DX of Acute respiratory failure with hypoxemia     Tube feeding initiated on 3/27/23. Current TF via NG is Peptamen Intense VHP, goal rate 70 ml/hr, providing 1680 kcals, 154 grams protein, 1411 mL free water, 127 (g of CHO).    TF held this a.m. for 850mL residuals. Of note, pt has not had a BM since 3/24. Senna, miralax, polyethylene glycol-electrolytes and sodium phosphate enema given 3/30 and 3/31  MAR also includes fentanyl, lasix, insulin gtt 4 units/hr, levo @ 0.13 mcg/kg/min (10.1 mcg/min) (titrating up), propofol @ 80 mcg (982 kcal/day), rocuronium  Labs: phos 7.4  Pt w/ HAYES and fluid overload requiring HD. Current TF formula is very high protein to meet elevated needs in setting of ARDS, PNA, and COPD on ventilator support; current TF is also a 1.0 kcal formula providing 1411 ml of free water daily, lower-phos provision. In setting of fluid overload and renal labs w/ overall unfavorable trend, a low-volume, moderate-high protein formula is indicated at this time.    Wt used in calculations: 130.9 kg (3/24) - lowest wt this admit  PSU (24-hour T-max 36.7, VE = 14.8 L/min) = 2451 kcal/day  SCC Guidelines for pt w/ BMI>30:  11-14 kcal/k - 1833 kcal/day  PSU x 65-70% = 1593 - 1716 kcal/day  Protein per IBW 80.9 k - 162 g/day (1.5-2.0 g/kg)  Protein per actual wt: 131 - 157 g/day (1.0-1.2 g/kg)    Recommendations/Plan:  Propofol currently providing 982 kcals daily, will adjust TF rate accordingly. Due to difficulty meeting increased protein needs indicated with hypocaloric feeds will feed closer to REE until propofol requirements are lower.    Once cleared to restart tube feeds by physician, change formula to Impact Peptide 1.5, goal rate of 50 ml/hr which will provide daily 1800 kcals (+propofol kcals), 113 grams protein, 924 mL free water, 168 g CHO.  Consider continuing to hold tube feeds  until constipation resolved. Do not start/advance TF until cleared by physician. Updated Dr. Mcdonald w/ nutrition POC.  Additional fluids per MD.  Monitor weight trends.    RD continues to follow.

## 2023-03-31 NOTE — CARE PLAN
Problem: Ventilation  Goal: Ability to achieve and maintain unassisted ventilation or tolerate decreased levels of ventilator support  Description: Target End Date:  4 days      Document on Vent flowsheet     1.  Support and monitor invasive and noninvasive mechanical ventilation  2.  Monitor ventilator weaning response  3.  Perform ventilator associated pneumonia prevention interventions  4.  Manage ventilation therapy by monitoring diagnostic test results  Outcome: Not Met                                      Ventilator Daily Summary     Vent Day # 5     Ventilator settings changed this shift: None     Weaning trials: N/A     Respiratory Procedures: N/A      Plan: Continue current ventilator settings and wean mechanical ventilation as tolerated per physician orders.

## 2023-03-31 NOTE — PROGRESS NOTES
Spoke with Dr. Ludwig in regards to patient's decreasing urine output secondary to new start HD. No new orders received.

## 2023-03-31 NOTE — PROGRESS NOTES
HD treatment # 2 today using temp CVC,run with good flows.Treatment tolerated well.BP soft the entire time,run mostly in the 90's-80's,supported with low dose of levo.Net UF removed 3000 ml.CVC locked with heparin.Report given to primary Rn.

## 2023-03-31 NOTE — PROGRESS NOTES
Bedside report received from night shift RN. Assumed patient care. No signs of distress at this time. All lines traced, medications and rates verified. Tele monitor on, rhythm and monitor summary verified. Safety precautions in place. Call light and personal belongings within reach. Educated patient to use call light if assistance is needed. Will continue to monitor.

## 2023-03-31 NOTE — PROGRESS NOTES
Pulmonary Progress Note    Date of admission  3/21/2023    Chief Complaint  57 y.o. male admitted 3/21/2023 with acute hypoxic respiratory failure    Hospital Course  57 y.o. male who presented 3/21/2023 with a history of HFpEF, HTN, and cerebral aneurysm about 10 years ago.  He reports he was admitted at that time for a while.  He smokes, previously a pack a day but quit 2 years ago.  He denies significant shortness of breath at baseline.  He previously worked as a  but has been officially on disability since his aneurysm 10 years ago.  He denies wheezing or chronic cough.  Recently he has had several courses of steroids for possible pneumonias. He presented to an OSH with acute hypoxic respiratory failure and underwent CT scan with diffuse GGO with areas of mosaicism essentially from top to bottom in the lungs with no geographic predominance. Of note, when he was admitted with his aneurysm he was intubated for 8 days with diffuse patchy GGO on CT which was considered to be aspiration pneumonia at the time but appears to have upper lobe involvement and a pattern more consistent with ILD or inflammation.  Here he has been managed on BIPAP but is in increasing respiratory distress.  His infiltrates are becoming more dense and appear consistent with ARDS.  His volumes are low potentially due to abdominal obesity and fatigue.       Aldolase is positive, CKMB negative.  CTD  panels pending     Significant family history of COPD and smoking but no other lung diseases per patient's mother.  No CTD history in the family either.  He cannot provide a clear history about muscle weakness or pain.  No birds at home or farm exposures.     Interval Problem Update  Reviewed last 24 hour events:  3/26: Intubated in am, plan for proning.  Myositis panel ordered.  3/27: HP1 and HP2 panels negative.  Still on 12 of PEEP and 70% FiO2.  No other significant changes overnight.  3/28: Proning. PEEP 10. FIO2 80%. No significant  changes.   3/29: No significant changes.  Transfer to [place] was declined because he is too obese for ECMO.  3/30: FIO2 and PEEP increased as patient is supined.   3/31: No changes     Review of Systems  Review of Systems   Unable to perform ROS: Intubated      Vital Signs for last 24 hours   Temp:  [35.7 °C (96.3 °F)-36.7 °C (98.1 °F)] 35.7 °C (96.3 °F)  Pulse:  [] 81  Resp:  [38-50] 40  BP: ()/(43-66) 85/43  SpO2:  [89 %-95 %] 93 %    Hemodynamic parameters for last 24 hours       Respiratory Information for the last 24 hours  Vent Mode: APVCMV  Rate (breaths/min): 40  Vt Target (mL): 370  PEEP/CPAP: 10  MAP: 20  Control VTE (exp VT): 371    Physical Exam   Physical Exam  Vitals reviewed. Exam conducted with a chaperone present.   Constitutional:       Appearance: He is ill-appearing.   HENT:      Head: Atraumatic.   Cardiovascular:      Rate and Rhythm: Normal rate and regular rhythm.   Pulmonary:      Breath sounds: No wheezing or rhonchi.   Abdominal:      Palpations: Abdomen is soft.   Musculoskeletal:         General: No swelling, tenderness or deformity.      Right lower leg: No edema.      Left lower leg: No edema.   Skin:     General: Skin is warm and dry.   Neurological:      Comments: Intubated and sedated       Medications  Current Facility-Administered Medications   Medication Dose Route Frequency Provider Last Rate Last Admin    lactulose 20 GM/30ML solution 30 mL  30 mL Enteral Tube TID Fidel Mcdonald Jr., D.O.        methylPREDNISolone (SOLU-MEDROL) 40 MG injection 40 mg  40 mg Intravenous Q12HRS CHANG Ballard.OSimran   40 mg at 03/31/23 0523    rosuvastatin (CRESTOR) tablet 10 mg  10 mg Enteral Tube Q EVENING Fidel Mcdonald Jr., D.O.   10 mg at 03/30/23 1800    cefepime (Maxipime) 1 g in  mL IVPB  1 g Intravenous QHS Fidel Mcdonald Jr., D.O.   Stopped at 03/30/23 2209    heparin injection 2,800 Units  2,800 Units Intracatheter PRN Parisa Mccallum M.D.   2,800 Units at 03/31/23  0900    famotidine (PEPCID) tablet 20 mg  20 mg Enteral Tube DAILY Fidel Mcdonald Jr. D.O.   20 mg at 03/30/23 0627    Or    famotidine (PEPCID) injection 20 mg  20 mg Intravenous DAILY Fidel Mcdonald Jr., D.O.   20 mg at 03/31/23 0523    heparin injection 5,000 Units  5,000 Units Subcutaneous Q8HRS Fidel Mcdonald Jr. D.O.   5,000 Units at 03/31/23 0523    acetylcysteine (MUCOMYST) 20 % solution 3 mL  3 mL Inhalation Q4HRS Fidel Mcdonald Jr. D.O.   3 mL at 03/31/23 1219    furosemide (LASIX) injection 80 mg  80 mg Intravenous 4X/DAY Fidel Mcdonald Jr. D.O.   80 mg at 03/31/23 1244    artificial tears (EYE LUBRICANT) ophth ointment 1 Application.  1 Application. Both Eyes Q8HRS Dee Craig M.D.   1 Application. at 03/31/23 0537    propofol (DIPRIVAN) injection  0-80 mcg/kg/min (Ideal) Intravenous Continuous Dee Craig M.D. 37.2 mL/hr at 03/31/23 1244 80 mcg/kg/min at 03/31/23 1244    fentaNYL (SUBLIMAZE) 50 mcg/mL in 50mL (Continuous Infusion)   Intravenous Continuous Dee Craig M.D. 12 mL/hr at 03/31/23 0903 600 mcg/hr at 03/31/23 0903    rocuronium (ZEMURON) 250 mg in  mL Infusion  0-16 mcg/kg/min (Ideal) Intravenous Continuous Dee Craig M.D. 46.6 mL/hr at 03/31/23 1218 10 mcg/kg/min at 03/31/23 1218    rocuronium (ZEMURON) injection 46.6 mg  0.6 mg/kg (Ideal) Intravenous Q2HRS PRN Dee Craig M.D.   46.6 mg at 03/31/23 0055    Pharmacy Consult: Enteral tube insertion - review meds/change route/product selection  1 Each Other PHARMACY TO DOSE CHANG Huddleston Jr..OSimran        insulin regular (HumuLIN R) 100 Units in  mL Infusion  0-29 Units/hr Intravenous Continuous Fidel Mcdonald Jr., CHANG.O. 4 mL/hr at 03/31/23 1201 4 Units/hr at 03/31/23 1201    dextrose 10 % BOLUS 12.5-25 g  12.5-25 g Intravenous PRN CHANG Huddleston Jr..O.        fentaNYL (SUBLIMAZE) injection  mcg   mcg Intravenous Q HOUR PRN Adrian Ludwig M.D.   100 mcg at 03/30/23 0110     senna-docusate (PERICOLACE or SENOKOT S) 8.6-50 MG per tablet 2 Tablet  2 Tablet Enteral Tube BID CLARK PinoOSimran   2 Tablet at 03/31/23 0523    And    polyethylene glycol/lytes (MIRALAX) PACKET 1 Packet  1 Packet Enteral Tube QDAY PRN CLARK PinoO.   1 Packet at 03/31/23 0818    And    magnesium hydroxide (MILK OF MAGNESIA) suspension 30 mL  30 mL Enteral Tube QDAY PRN Vince Daniels D.O.        And    bisacodyl (DULCOLAX) suppository 10 mg  10 mg Rectal QDAY PRN CLARK PinoO.        sertraline (Zoloft) tablet 100 mg  100 mg Enteral Tube DAILY CLARK PinoOSimran   100 mg at 03/31/23 0524    acetaminophen (TYLENOL) tablet 1,000 mg  1,000 mg Enteral Tube Q6HRS PRN Vince Daniels D.O.        Respiratory Therapy Consult   Nebulization Continuous RT Vince Daniels D.O.        MD Alert...ICU Electrolyte Replacement per Pharmacy   Other PHARMACY TO DOSE Vince Daniels D.O.        lidocaine (XYLOCAINE) 1 % injection 2 mL  2 mL Tracheal Tube Q30 MIN PRN Vince Daniels D.O.        norepinephrine (Levophed) 8 mg in 250 mL NS infusion (premix)  0-1 mcg/kg/min (Ideal) Intravenous Continuous Vince Daniels D.O. 18.9 mL/hr at 03/31/23 1118 0.13 mcg/kg/min at 03/31/23 1118    doxycycline (VIBRAMYCIN) 100 mg in  mL IVPB  100 mg Intravenous Q12HRS CLARK Huddleston Jr.OSimran   Stopped at 03/31/23 0636    ipratropium-albuterol (DUONEB) nebulizer solution  3 mL Nebulization Q4H PRN (RT) Chino Mendoza M.D.   3 mL at 03/21/23 2338    [Held by provider] fluticasone (FLOVENT HFA) 44 MCG/ACT inhaler 88 mcg  2 Puff Inhalation QDAILY (RT) Chino Mendoza M.D.   88 mcg at 03/25/23 0718    [Held by provider] umeclidinium-vilanterol (Anoro Ellipta) inhaler 1 Puff  1 Puff Inhalation QDAILY (RT) Chino Mendoza M.D.   1 Puff at 03/25/23 0718    ipratropium-albuterol (DUONEB) nebulizer solution  3 mL Nebulization Q4HRS (RT) Brennan Martin M.D.   3 mL at 03/31/23 1219       Fluids    Intake/Output Summary (Last 24 hours) at 3/31/2023 1259  Last  data filed at 3/31/2023 1200  Gross per 24 hour   Intake 4220.87 ml   Output 6885 ml   Net -2664.13 ml         Laboratory  Recent Labs     03/30/23  0426 03/30/23  0655 03/30/23  1502 03/30/23  2222 03/31/23  0755   [id number]  --   --   --   --  7.13*   [id number]  --   --   --   --  83.8*   [id number]  --   --   --   --  92.3*   [id number]  --   --   --   --  95.7   ARTHCO3  --   --   --   --  27*   ARTBE  --   --   --   --  -4   ISTATAPH 7.145* 7.155* 7.151* 7.201*  --    ISTATAPCO2 80.9* 76.3* 81.4* 71.4*  --    ISTATAPO2 112* 83 66 77  --    ISTATATCO2 30 29 31 30  --    [id number] 96 92* 85* 91*  --    ISTATARTHCO3 27.8* 26.9* 28.4* 28.0*  --    ISTATARTBE -4 -4 -3 -2  --    ISTATTEMP 36.2 C see below 36.3 C 36.5 C  --    ISTATFIO2 100 80 90 90  --    ISTATSPEC Arterial Arterial Arterial Arterial  --    ISTATAPHTC 7.155*  --  7.161* 7.208*  --    [id number] 107*  --  63* 75  --            Recent Labs     03/29/23  0430 03/29/23  1437 03/30/23  1200 03/30/23  1745 03/31/23  0315   SODIUM 138   < > 139 137 135   POTASSIUM 4.4   < > 4.9 4.2 4.6   CHLORIDE 103   < > 102 99 96   CO2 22   < > 23 25 23   BUN 91*   < > 111* 85* 95*   CREATININE 2.55*   < > 2.62* 2.12* 2.78*   MAGNESIUM 2.7*  --   --   --  2.4   PHOSPHORUS 5.3*  --   --   --  7.4*   CALCIUM 9.0   < > 8.9 8.4* 8.4*    < > = values in this interval not displayed.       Recent Labs     03/30/23  1200 03/30/23  1745 03/31/23 0315   ALTSGPT 34 37 28   ASTSGOT 27 29 19   ALKPHOSPHAT 79 87 79   TBILIRUBIN 0.2 0.2 0.2   GLUCOSE 164* 158* 195*       Recent Labs     03/29/23  0430 03/30/23  0058 03/30/23  1200 03/30/23 1745 03/31/23 0315   WBC 15.1*  --   --   --  21.2*   NEUTSPOLYS 92.00*  --   --   --  81.80*   LYMPHOCYTES 2.80*  --   --   --  7.30*   MONOCYTES 3.80  --   --   --  9.10   EOSINOPHILS 0.00  --   --   --  0.30   BASOPHILS 0.10  --   --   --  0.10   ASTSGOT  --    < > 27 29 19   ALTSGPT  --    < > 34 37 28   ALKPHOSPHAT  --    < > 79 87 79    TBILIRUBIN  --    < > 0.2 0.2 0.2    < > = values in this interval not displayed.       Recent Labs     03/29/23  0430 03/31/23  0315   RBC 4.72 4.82   HEMOGLOBIN 13.1* 13.2*   HEMATOCRIT 41.2* 41.8*   PLATELETCT 167 173        - ANCA - MPO & PR3 negative   - THELMA negative   - CTD panel negative  - HP panels negative; this does not exclude HP from the differential.   - Aldolase positive   - ESR/CRP elevated   - CPK normal   - Sputum DFA negative   - Culture with PSA + growth   - BAL 3/26 - PMN predominant c/w acute PNA. Difficult to interpret as was already on steroids which may suppress a lymphocytic pleiotropy    - Sent extended myositis panel under misc on 3/26 - RUST [id number]    Imaging  X-Ray:  I have personally reviewed the images and compared with prior images.  CT:    Reviewed  Echo:   Reviewed    Assessment/Plan  #Acute hypoxic respiratory failure  #Diffuse GGO on CT -   #PSA pneumonia with ARDS  Plan:  I personally reviewed CT scans from 2013 as well as now.  The patient has a history of mid & upper lung findings dating back to 2013.  He was also intubated in 2013.  Interesting that he is worked as a .  Differential diagnosis includes hypersensitivity pneumonitis versus sarcoidosis versus pneumoconiosis.  I discussed with the team on 3/27.      I recommended pulsing with Solu-Medrol 250 mg every 6 hours for 48 to 72 hours and monitoring response. This did not make much of a difference, although it may have halted progression. Leading diagnosis at this point is a fibrotic syndrome such as diffuse alveolar damage likely 2/2 ARDS from pneumonia.     - Diurese as much as possible; appears to be dropping off somewhat   -completed 3 days of Solumedrol 250 mg q6h. Weaned down to 40 mg q12h on 3/30. Can continue to taper off as he does not appear to have a steroid-responsive etiology.   -He likely has diffuse alveolar damage (ARDS) and prognosis and potential for recovery are uncertain.  -can consider  off-label use of a tyrosine kinase inhibitor such as Nintedanib if progression suspected  -prognosis guarded. Consider palliative conversation with family for GOC    We will sign off. Please do not hesitate to contact us if we can be of any further assistance. I am most easily reached via [employer] secure messaging application.      Total consult time: 30 minutes which included time spent on chart review, personally reviewing pertinent images and labs, time spent counseling and educating the patient and/or family members, and coordinating care with the healthcare team to include consultants.     You Loredo,   Staff Pulmonologist and Intensivist  [facility]     Please note that this dictation was created using voice recognition software. The accuracy of the dictation is limited to the abilities of the software. I have made every reasonable attempt to correct obvious errors, but I expect that there are errors of grammar and possibly content that I did not discover before finalizing the note.     [employer] Measures [employer] Quality Metrics

## 2023-03-31 NOTE — PROGRESS NOTES
"[facility] Progress Note    Admit Date: 3/21/2023    Resident(s): Daniela Frausto M.D.   Attending:  CARTER WELCH/ Dr. Mcdonald    Patient ID:    Name:  Josse Valle   YOB: 1965  Age:  57 y.o.  male   MRN:  [id number]    Chief Complaint  Acute Hypoxic Respiratory Failure    Hospital Course (carried forward and updated):  \"57-year-old male, PMH ruptured SAH 2013, disabled since, HTN, obesity, prior smoker quit 2 years ago, COPD not usually oxygen dependent, recently admitted at [facility] 3/9 - 3/12 with presumed COPD exacerbation and pneumonia, treated with steroids and antibiotics, discharged home on 6 L oxygen.  After completed steroids he began feeling much worse, readmitted 3/19 with increasing respiratory distress, requiring high flow nasal cannula alternating with BiPAP and high FiO2.  CT scan of the chest 3/21 showed no evidence of pulm embolism, significant bilateral groundglass and reticular infiltrates throughout all lung fields.  He is started back on IV corticosteroids, broad-spectrum antibiotics for possible HCAP with cefepime and vancomycin and transferred to [facility] for further evaluation.  Further work-up there included a high CRP of 261, ESR 65, WBC 23 and no peripheral eosinophilia.     Patient worked as a  until his ruptured SAH in 2013 and has been on disability since.  He does not have any pussycats or birdies but does have a little puppy chiwennie.  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  He denies any recreational/illicit drug use.  He had not required supplemental oxygen until his recent hospitalization.\" - Dr. Martin's note    3/21 - admitted to ICU.  3/22 - s/p solumedrol 500mg IV x1, followed with 80 Q8H  3/22 - lasix 40 Q12H   3/25 - unasyn changed to cefepime due to Pseudomonas on sputum culture  3/26 - Intubated  3/27 - VD #2, APV-CMV, RR 32, PEEP 10,  ; " started high dose solumedrol 80->250 q6h, Insulin GTT, stopped paralytics  3/28 - VD #3, not responding to steroids, reached out to transfer centers for ECMO  3/29 - VD #4, APV-CMV, RR 32->34, PEEP 10->12, ->400, FiO2 70%->80%->90%, not a candidate for ECMO w/ BMI 40s.       Consultants:  Critical Care  Pulmonology    Interval Events:    3/30- Soft BP with HD. Had 2.5L off yesterday. HD running this AM. Levo for MAP>65. Dr. Mcdonald and Dr. Loredo (pul) had GOC conversation yesterday with wife. She would like patient to remain full code at this time.     Reviewed last 24 hour events:   Neuro: unchanged. On Charles. RASS -5, nor corneal, cough, or gag  HR: SR-ST. 90s to 100s  SBP: soft. SBP 80s, MAPs in low 60s. Currently off levo.  Tmax: AF  GI: NG tube feeding  I/O: Decreased UOP, 985 out via trejo in last 24hr. HD: 3/30- 2.5 L off. 3/31- 3 L off.  Lines: CVC, NG tube, L radial art line, trejo  Mobility: Level 1  Resp: VD #5, APV-CMV, duoneb q4h, methylprednisolone 250 mg every 6 hours. proning  CXR: pulmonary edema and/or infiltrates, stable  Vte: Heparin  PPI/H2: H2  Antibx: Cefepime (3/25- end today 3/31), Doxy (3/26- end today 3/31)    Review of Systems  Review of Systems   Unable to perform ROS: Intubated   Cardiovascular:  Negative for orthopnea.     Vital Signs for the last 24 hours  Temp:  [35.7 °C (96.3 °F)-36.7 °C (98.1 °F)] 36 °C (96.8 °F)  Pulse:  [] 88  Resp:  [38-50] 40  BP: ()/(43-66) 105/55  SpO2:  [90 %-95 %] 94 %    Hemodynamic parameters for the last 24 hours       Vent Settings for the last 24 hours  Vent Mode: APVCMV  Rate (breaths/min): 40  Vt Target (mL): 370  PEEP/CPAP: 10  MAP: 20  Control VTE (exp VT): 371    Physical Exam  Physical Exam  Constitutional:       Appearance: He is ill-appearing.      Comments: Generalized edema   HENT:      Head: Atraumatic.      Mouth/Throat:      Mouth: Mucous membranes are moist.   Eyes:      Extraocular Movements: Extraocular movements  intact.   Cardiovascular:      Rate and Rhythm: Normal rate and regular rhythm.      Comments: RIJ in place, left arterial line in place  Pulmonary:      Comments: Mechanical breath sounds  Abdominal:      Palpations: Abdomen is soft.   Genitourinary:     Comments: Cole in place  Musculoskeletal:         General: No swelling, tenderness or deformity.      Right lower leg: Edema present.      Left lower leg: Edema present.   Skin:     General: Skin is warm and dry.   Neurological:      Comments: Intubated and sedated   Psychiatric:      Comments: Unable to assess       Medications  Current Facility-Administered Medications   Medication Dose Route Frequency Provider Last Rate Last Admin    lactulose 20 GM/30ML solution 30 mL  30 mL Enteral Tube TID Fidel Mcdonald Jr., D.O.   30 mL at 03/31/23 1322    methylPREDNISolone (SOLU-MEDROL) 40 MG injection 40 mg  40 mg Intravenous Q12HRS You Loredo D.O.   40 mg at 03/31/23 0523    rosuvastatin (CRESTOR) tablet 10 mg  10 mg Enteral Tube Q EVENING Fidel Mcdonald Jr. D.O.   10 mg at 03/30/23 1800    cefepime (Maxipime) 1 g in  mL IVPB  1 g Intravenous QHS Fidel Mcdonald Jr. D.O.   Stopped at 03/30/23 2209    heparin injection 2,800 Units  2,800 Units Intracatheter PRN Parisa Mccallum M.D.   2,800 Units at 03/31/23 0900    famotidine (PEPCID) tablet 20 mg  20 mg Enteral Tube DAILY Fidel Mcdonald Jr. D.O.   20 mg at 03/30/23 0627    Or    famotidine (PEPCID) injection 20 mg  20 mg Intravenous DAILY Fidel Mcdonald Jr. D.O.   20 mg at 03/31/23 0523    heparin injection 5,000 Units  5,000 Units Subcutaneous Q8HRS Fidel Mcdonald Jr. D.O.   5,000 Units at 03/31/23 1322    acetylcysteine (MUCOMYST) 20 % solution 3 mL  3 mL Inhalation Q4HRS Fidel Mcdonald Jr. D.O.   3 mL at 03/31/23 1219    artificial tears (EYE LUBRICANT) ophth ointment 1 Application.  1 Application. Both Eyes Q8HRS Dee Craig M.D.   1 Application. at 03/31/23 1323    propofol  (DIPRIVAN) injection  0-80 mcg/kg/min (Ideal) Intravenous Continuous Dee Craig M.D. 37.2 mL/hr at 03/31/23 1244 80 mcg/kg/min at 03/31/23 1244    fentaNYL (SUBLIMAZE) 50 mcg/mL in 50mL (Continuous Infusion)   Intravenous Continuous Dee Craig M.D. 12 mL/hr at 03/31/23 1324 600 mcg/hr at 03/31/23 1324    rocuronium (ZEMURON) 250 mg in  mL Infusion  0-16 mcg/kg/min (Ideal) Intravenous Continuous Dee Craig M.D. 46.6 mL/hr at 03/31/23 1218 10 mcg/kg/min at 03/31/23 1218    rocuronium (ZEMURON) injection 46.6 mg  0.6 mg/kg (Ideal) Intravenous Q2HRS PRN Dee Craig M.D.   46.6 mg at 03/31/23 0055    Pharmacy Consult: Enteral tube insertion - review meds/change route/product selection  1 Each Other PHARMACY TO DOSE CHANG Huddleston Jr..O.        insulin regular (HumuLIN R) 100 Units in  mL Infusion  0-29 Units/hr Intravenous Continuous CLARK Huddleston Jr.OSimran 4 mL/hr at 03/31/23 1401 4 Units/hr at 03/31/23 1401    dextrose 10 % BOLUS 12.5-25 g  12.5-25 g Intravenous PRN CHANG Huddleston Jr..OSimran        fentaNYL (SUBLIMAZE) injection  mcg   mcg Intravenous Q HOUR PRN Adrian Ludwig M.D.   100 mcg at 03/30/23 0110    senna-docusate (PERICOLACE or SENOKOT S) 8.6-50 MG per tablet 2 Tablet  2 Tablet Enteral Tube BID Vince Daniels D.O.   2 Tablet at 03/31/23 0523    And    polyethylene glycol/lytes (MIRALAX) PACKET 1 Packet  1 Packet Enteral Tube QDAY PRN Vince Daniels D.O.   1 Packet at 03/31/23 0818    And    magnesium hydroxide (MILK OF MAGNESIA) suspension 30 mL  30 mL Enteral Tube QDAY PRN Vince Tedja, D.O.        And    bisacodyl (DULCOLAX) suppository 10 mg  10 mg Rectal QDAY PRN Vince Daniels D.O.        sertraline (Zoloft) tablet 100 mg  100 mg Enteral Tube DAILY Vince Daniels D.O.   100 mg at 03/31/23 0524    acetaminophen (TYLENOL) tablet 1,000 mg  1,000 mg Enteral Tube Q6HRS PRN Vince Daniels D.O.        Respiratory Therapy Consult   Nebulization Continuous RT Vince  HARSHAL Daniels.        MD Alert...ICU Electrolyte Replacement per Pharmacy   Other PHARMACY TO DOSE Vince Daniels D.O.        lidocaine (XYLOCAINE) 1 % injection 2 mL  2 mL Tracheal Tube Q30 MIN PRN Vince Daniels D.O.        norepinephrine (Levophed) 8 mg in 250 mL NS infusion (premix)  0-1 mcg/kg/min (Ideal) Intravenous Continuous Vince Daniels D.O. 23.3 mL/hr at 03/31/23 1353 0.16 mcg/kg/min at 03/31/23 1353    doxycycline (VIBRAMYCIN) 100 mg in  mL IVPB  100 mg Intravenous Q12HRS Fidel Mcdonald Jr., D.O.   Stopped at 03/31/23 0636    ipratropium-albuterol (DUONEB) nebulizer solution  3 mL Nebulization Q4H PRN (RT) Chino Mendoza M.D.   3 mL at 03/21/23 2338    [Held by provider] fluticasone (FLOVENT HFA) 44 MCG/ACT inhaler 88 mcg  2 Puff Inhalation QDAILY (RT) Chino Mendoza M.D.   88 mcg at 03/25/23 0718    [Held by provider] umeclidinium-vilanterol (Anoro Ellipta) inhaler 1 Puff  1 Puff Inhalation QDAILANGELES (RT) Chino Mendoza M.D.   1 Puff at 03/25/23 0718    ipratropium-albuterol (DUONEB) nebulizer solution  3 mL Nebulization Q4HRS (RT) Brennan Martin M.D.   3 mL at 03/31/23 1219       Fluids    Intake/Output Summary (Last 24 hours) at 3/31/2023 1406  Last data filed at 3/31/2023 1353  Gross per 24 hour   Intake 4590.29 ml   Output 6740 ml   Net -2149.71 ml       Laboratory  Recent Labs     03/30/23  0426 03/30/23  0655 03/30/23  1502 03/30/23  2222 03/31/23  0755   [id number]  --   --   --   --  7.13*   [id number]  --   --   --   --  83.8*   [id number]  --   --   --   --  92.3*   [id number]  --   --   --   --  95.7   ARTHCO3  --   --   --   --  27*   ARTBE  --   --   --   --  -4   ISTATAPH 7.145* 7.155* 7.151* 7.201*  --    ISTATAPCO2 80.9* 76.3* 81.4* 71.4*  --    ISTATAPO2 112* 83 66 77  --    ISTATATCO2 30 29 31 30  --    [id number] 96 92* 85* 91*  --    ISTATARTHCO3 27.8* 26.9* 28.4* 28.0*  --    ISTATARTBE -4 -4 -3 -2  --    ISTATTEMP 36.2 C see below 36.3 C 36.5 C  --    ISTATFIO2 100 80 90 90  --     ISTATSPEC Arterial Arterial Arterial Arterial  --    ISTATAPHTC 7.155*  --  7.161* 7.208*  --    [id number] 107*  --  63* 75  --      Recent Labs     03/29/23 0430 03/29/23  1437 03/30/23 1200 03/30/23 1745 03/31/23 0315   SODIUM 138   < > 139 137 135   POTASSIUM 4.4   < > 4.9 4.2 4.6   CHLORIDE 103   < > 102 99 96   CO2 22   < > 23 25 23   BUN 91*   < > 111* 85* 95*   CREATININE 2.55*   < > 2.62* 2.12* 2.78*   MAGNESIUM 2.7*  --   --   --  2.4   PHOSPHORUS 5.3*  --   --   --  7.4*   CALCIUM 9.0   < > 8.9 8.4* 8.4*    < > = values in this interval not displayed.     Recent Labs     03/30/23 1200 03/30/23 1745 03/31/23 0315   ALTSGPT 34 37 28   ASTSGOT 27 29 19   ALKPHOSPHAT 79 87 79   TBILIRUBIN 0.2 0.2 0.2   GLUCOSE 164* 158* 195*     Recent Labs     03/29/23 0430 03/30/23  0058 03/30/23 1200 03/30/23 1745 03/31/23 0315   WBC 15.1*  --   --   --  21.2*   NEUTSPOLYS 92.00*  --   --   --  81.80*   LYMPHOCYTES 2.80*  --   --   --  7.30*   MONOCYTES 3.80  --   --   --  9.10   EOSINOPHILS 0.00  --   --   --  0.30   BASOPHILS 0.10  --   --   --  0.10   ASTSGOT  --    < > 27 29 19   ALTSGPT  --    < > 34 37 28   ALKPHOSPHAT  --    < > 79 87 79   TBILIRUBIN  --    < > 0.2 0.2 0.2    < > = values in this interval not displayed.     Recent Labs     03/29/23 0430 03/31/23 0315   RBC 4.72 4.82   HEMOGLOBIN 13.1* 13.2*   HEMATOCRIT 41.2* 41.8*   PLATELETCT 167 173       Imaging  X-Ray:  I have personally reviewed the images and compared with prior images.  EKG:  I have personally reviewed the images and compared with prior images.  CT:    Reviewed  Echo:   Reviewed    ASSESSEMENT and PLAN:    * ARDS (adult respiratory distress syndrome) (HCC)  Assessment & Plan  Likley 2/2 to PSA PNA  *Intubated 3/26, pending work-up for cause as below for acute respiratory failure with hypoxia  *PaO2/FiO2 ratio 137 mm Hg, Moderate ARDS  Pulm consulted- appreciate recs. Signed off 3/31  completed 3 days of Solumedrol 250 mg q6h-  no responsive   On steroid taper  Weaned down to 40 mg q12h on 3/30.     - Per pulm can continue to taper off as he does not appear to have a steroid-responsive etiology.   - could consider off label Nintedanib per pulm  -Management as below for acute respiratory failure with hypoxia  -Increasing CO2 retention, ventilator settings optimized, will continue to monitor  -Goal sat >88%, paO2 >55, Pplat <30  -Serial ABGs  -CXR to monitor lung volumes and tube/line placement   -VAP bundle prevention, oral care, post pyloric feeding   -Head of bed > 30 degree   -GI prophylaxis   -Daily awakening and SBT trials unless contraindicated   -Monitor for liberation   -Respiratory treatments: prn       Acute kidney injury (HCC)  Assessment & Plan  *Cr 0.71 -->2.55-->2.45  *2/2 contrast induced nephropathy  Nephrology following  Started HD 3/30    -Furosemide 80 mg QID  -appreciate nephro recs    Acute respiratory failure with hypoxemia (HCC)- (present on admission)  Assessment & Plan  *Due to severe ARDS due to concern of suspected ILD, unclear etiology   *ANCA, MPO & PR3 negative, THELMA negative, CTD panel negative,  HP panel negative, Coccidiodes antibodies negative, resp viral panel negative, legionella and strep pneumo negative, pneumocystitis negative, IGE serum WNL, fungal culture negative, PCP DFA negative,  Aspergillus negative, Sputum DFA negative, Culture with PSA + growth, BAL 3/26 - white, clear, polys 85%, lymph 4%  *CT chest w/o (3/27/23):  w/ diffuse hazy parenchymal opacities, vs pneumonitis, probable background pulmonary fibrosis  *The differential is very broad from PSA associated ARDS to possible myositis associated ILD and many other possibilities in between including PCP, HP, NSIP and     -Extended myositis panel under misc on 3/26 - [facility]UP [id number] pending   -ARDS management as stated above  -Will hold off for flolan at this time and reassess   -Continue with proning 16 hr + supine 8 hr  -Tube feeding  (3/27-)  -Stop paralytics, keep RAAS -5  -continue abx to complete 7 day course  -Increase bowel regime, 2L PEG BID  -Continue diuresis w/ Lasix 80 mg QID for HAYES 2/2 contrast induced nephropathy, nephrology consult today for possible iHD or CRRT  -continue steroid taper  -Not a candidate to transfer for ECMO w/ Corley Lung Score: 3.3 due to BMI 40.59  -Pulmonology signed off 3/31, appreciate recommendations    Goals of care, counseling/discussion  Assessment & Plan  Discussed the patient's declining condition with his wife, options for care including comfort measures and continued aggressive care which may be unsuccessful, and if successful will likely lead to prolonged ventilation and 24/7 care. She remains hopeful and requests full treatment including HD.    Hyperglycemia  Assessment & Plan  *Likely 2/2 steroid use    -Insulin GTT, goal of 120-180    Pseudomonas pneumonia (HCC)  Assessment & Plan  *Sputum culture (3/23/23): + for pseudomonas koreensis, pan sensitivity, C3  *Bcx 3/26 NGTD     -Continue Cefepime x 7 days    Severe pulmonary hypertension (HCC)  Assessment & Plan  *This is evident on echo done on 03/22/2023 showing RVSP of 65-70 with an EF of 55%.  Also showing moderately dilated right ventricle.  *This is likely secondary to not only sleep apnea but also  highly suspect that the ILD    -Continue furosemide and steroid  -Hold off on Flolan for now    COPD (chronic obstructive pulmonary disease) (HCC)  Assessment & Plan    -Hold home inhalers given severity of condition    (HFpEF) heart failure with preserved ejection fraction (HCC)  Assessment & Plan  *Acute on chronic, BNP 2085, some pleural edema noted on CXR   *Echo (3/22/23): EF 55%, RSVP 65-70 mmHg, moderately dilated right ventricle    -Daily weight  -Daily I's and O's  -Currently on diuretics  -Hold home metoprolol and benazepril    Suspected sleep apnea  Assessment & Plan  STOP BANG of 7  Will need sleep study after  discharge    Obesity  Assessment & Plan  *BMI 40.59    -Discuss about weight management and nutrition outpatient    HTN (hypertension)  Assessment & Plan    -Hold antihypertensives 2/2 acuity of condition    Mood disorder (HCC)  Assessment & Plan  Cont home sertraline    Hyperlipidemia  Assessment & Plan  Cont home statin    Subarachnoid hemorrhage from anterior communicating artery (HCC)- (present on admission)  Assessment & Plan  *History of in 2013        VTE:  Heparin  Ulcer: H2 Antagonist  Lines: Central Line  Ongoing indication addressed, Arterial Line  Ongoing indication addressed, and Cole Catheter  Ongoing indication addressed    I have performed a physical exam and reviewed and updated ROS and Plan today (3/31/2023). In review of yesterday's note (3/30/2023), there are no changes except as documented above.     Discussed patient condition and risk of morbidity and/or mortality with RN, RT, Therapies, Pharmacy, , and Charge nurse / hot rounds      Daniela Frausto M.D.  PGY-2 Internal Medicine Resident

## 2023-03-31 NOTE — PROGRESS NOTES
[facility] Progress Note         HD #1 today x 2.5 hours per Dr. Mccallum. Tx initiated at 1417 and ended at 1648    Pt sedated, intubated, on prone position, s/p LNTDC insertion, placement verified through xray, and ready to be used per Dr. Mcdonald. On pressor x1 for BP support (+) consent for HD treatment     NET UF: 2000 ml    Tx tolerated. Pressor was titrated once to manage hypotension. BP improved thereafter. Blood returned, ports flushed with NS. Heparin 1000 units/ml used to lock catheter per designated amount. CVC ports clamped and capped. Aspirate heparin prior to next CVC use. See eflow sheets for further details.    Report given to LINDA Johnson RN/ KAREEM Hodge RN

## 2023-03-31 NOTE — CARE PLAN
The patient is Watcher - Medium risk of patient condition declining or worsening    Shift Goals  Clinical Goals:  (Prone, monitor TOF, titrate medications per hemodynamic ranges, decrease O2 need)  Patient Goals: ANTIONE  Family Goals: ANTIONE    Progress made toward(s) clinical / shift goals:    Problem: Knowledge Deficit - Standard  Goal: Patient and family/care givers will demonstrate understanding of plan of care, disease process/condition, diagnostic tests and medications  Outcome: Progressing     Problem: Skin Integrity  Goal: Skin integrity is maintained or improved  Outcome: Progressing     Problem: Pain - Standard  Goal: Alleviation of pain or a reduction in pain to the patient’s comfort goal  Outcome: Progressing     Problem: Fall Risk  Goal: Patient will remain free from falls  Outcome: Progressing     Problem: Communication  Goal: The ability to communicate needs accurately and effectively will improve  Outcome: Progressing     Problem: Respiratory  Goal: Patient will achieve/maintain optimum respiratory ventilation and gas exchange  Outcome: Progressing     Problem: Fluid Volume  Goal: Fluid volume balance will be maintained  Outcome: Progressing       Patient is not progressing towards the following goals:

## 2023-03-31 NOTE — PROCEDURES
Nephrology/hemodialysis note    Patient with sefere COPD, pulmonary HTN, VDRF, volume overloaded, HAYES -on RRT  Seen and examined during dialysis treatment  Tolerates well  VS stable  UF goal 3 L  Lab results reviewed  Please see dialysis flow sheet for details

## 2023-04-01 NOTE — PROCEDURES
Nephrology/hemodialysis note    Patient with sefere COPD, pulmonary HTN, VDRF, volume overloaded, HAYES -on RRT  Still with high O2 requirements  Seen and examined during dialysis  Tolerates well  VS stable  UF 3 L  Lab results reviewed  Hyperkalemia -correcting with HD  Please see dialysis flow sheet for details

## 2023-04-01 NOTE — PROGRESS NOTES
Received call at 0200 that patient ABG worsening.  Received another call around 0400 stating that patient had art line flushed mutliple times but BP cough measures 80/50s while art line 59/42.  Art line had been flushed multiple times.  Critical lab 17.1.  Hyperkalemia and kidney function appear to be worsening over the past 3-4 days.

## 2023-04-01 NOTE — PROGRESS NOTES
[facility]     Dialysis treatment started at 0745 and completed at 1045. Nephrologist Dr. Mccallum notified of treatment start.     NET UF: 3000 mL     Patient is intubated, on vent and sedated. No signs ans ysmptoms of pain and discomfort noted. VS within normal limits. Right temporary IJ CVC, dressing is CDI and no signs and symptoms of infection noted. CVC Lines are patent w/ good blood flow. Lab results and orders reviewed prior to treatment start.     Patient completed and tolerated dialysis treatment well w/o complications. VS stayed within normal limits. Right temporary IJ, CVC care done aseptically, lines flushed, Heparin lock in each lines, then clamped and capped. Access site labelled and dated.    1100 Report given to EMETERIO Guy RN / JEREMY Gaston RN    See Dialysis flow sheet for details

## 2023-04-01 NOTE — CARE PLAN
The patient is Watcher - Medium risk of patient condition declining or worsening    Shift Goals  Clinical Goals:  (Prone, monitor TOF, titrate medications per hemodynamic ranges, decrease O2 need)  Patient Goals: ANTIONE  Family Goals: ANTIONE    Progress made toward(s) clinical / shift goals:    Problem: Knowledge Deficit - Standard  Goal: Patient and family/care givers will demonstrate understanding of plan of care, disease process/condition, diagnostic tests and medications  Outcome: Progressing     Problem: Skin Integrity  Goal: Skin integrity is maintained or improved  Outcome: Progressing     Problem: Pain - Standard  Goal: Alleviation of pain or a reduction in pain to the patient’s comfort goal  Outcome: Progressing     Problem: Fall Risk  Goal: Patient will remain free from falls  Outcome: Progressing     Problem: Discharge Barriers/Planning  Goal: Patient's continuum of care needs are met  Outcome: Progressing     Problem: Respiratory  Goal: Patient will achieve/maintain optimum respiratory ventilation and gas exchange  Outcome: Progressing     Problem: Mechanical Ventilation  Goal: Safe management of artificial airway and ventilation  Outcome: Progressing     Problem: Self Care  Goal: Patient will have the ability to perform ADLs independently or with assistance (bathe, groom, dress, toilet and feed)  Outcome: Progressing       Patient is not progressing towards the following goals:

## 2023-04-01 NOTE — PROCEDURES
"Arterial Line Insertion    Date/Time: 4/1/2023 11:58 AM  Performed by: Fidel Mcdonald Jr., D.O.  Authorized by: Fidel Mcdonald Jr., D.O.   Consent: The procedure was performed in an emergent situation.  Patient identity confirmed: arm band  Time out: Immediately prior to procedure a \"time out\" was called to verify the correct patient, procedure, equipment, support staff and site/side marked as required.  Preparation: Patient was prepped and draped in the usual sterile fashion.  Indications: multiple ABGs, respiratory failure and hemodynamic monitoring  Location: right radial    Sedation:  Patient sedated: yes (vent)    Myles's test normal: yes  Needle gauge: 18  Seldinger technique: Seldinger technique used  Number of attempts: 2  Post-procedure: line sutured and dressing applied  Post-procedure CMS: unchanged  Patient tolerance: patient tolerated the procedure well with no immediate complications  Comments: Wire confirmed removed at the end of the procedure by myself and RN        "

## 2023-04-01 NOTE — PROGRESS NOTES
At 0453, lab called with critical ABG results- ph-7.02, Pco2-93.4. Critical lab result read back to lab.   Dr. Ludwig notified of critical lab result at 0454.  Critical lab result read back by Dr. Ludwig.

## 2023-04-01 NOTE — CARE PLAN
Problem: Bronchoconstriction  Goal: Improve in air movement and diminished wheezing  Description: Target End Date:  2 to 3 days    1.  Implement inhaled treatments  2.  Evaluate and manage medication effects  Outcome: Not Met    Respiratory Update    Treatment modality: dUONEB  Frequency: Q4    Pt tolerating current treatments well with no adverse reactions.       Problem: Ventilation  Goal: Ability to achieve and maintain unassisted ventilation or tolerate decreased levels of ventilator support  Description: Target End Date:  4 days     Document on Vent flowsheet    1.  Support and monitor invasive and noninvasive mechanical ventilation  2.  Monitor ventilator weaning response  3.  Perform ventilator associated pneumonia prevention interventions  4.  Manage ventilation therapy by monitoring diagnostic test results  Outcome: Not Met  Ventilator Daily Summary    Vent Day #6    ETT: 8  26    Ventilator settings: CMV 40 370 10 100%      Plan: Continue current ventilator settings and wean mechanical ventilation as tolerated per physician orders.         Problem: Bronchopulmonary Hygiene  Goal: Increase mobilization of retained secretions  Description: Target End Date:  2 to 3 days    1.  Perform bronchopulmonary therapy as indicated by assessment  2.  Perform airway suctioning  3.  Perform actions to maintain patient airway  Outcome: Not Met    Respiratory Update    Treatment modality: Mucomyst  Frequency: Q4    Pt tolerating current treatments well with no adverse reactions.

## 2023-04-01 NOTE — RESPIRATORY CARE
@ approximately 0345 patient's ETT tape was noted to be saturated and no longer secure. ETT had slid out to 25 cm at the teeth. Old tape was removed, skin prep placed, and new tape was applied while patient remained in prone position. The ETT was returned to 27 cm at the teeth. Patient did not have any adverse reactions to changes. Bilateral breath sounds noted after reinsertion.   2 RN's and 3 RT's at bedside for re-taping and head repositioning.    Called and left message on V/M that the dietitian will be out of office and his appointment has been changed to SW   ( same time, same date)  Left contact information if there is a problem with this time , and he wants to reschedule

## 2023-04-01 NOTE — PROGRESS NOTES
Lab called about critical ABG results. pH 7.01 and CO2 100.5. Dr. Ludwig notified. Will await new orders.

## 2023-04-01 NOTE — PROGRESS NOTES
Bedside report received from night shift RN. Assumed patient care. No signs of distress at this time. Tele monitor on, rhythm and monitor summary verified. All lines IV traced, medications and rates verified. Safety precautions in place. Call light and personal belongings within reach. Educated patient to use call light if assistance is needed. Will continue to monitor.

## 2023-04-01 NOTE — PROGRESS NOTES
Informed Dr. Ludwig of patient's hypotension and increase of need for levophed over the last 30-45 mins. Dr. Ludwig ordered repeat ABG and to continue titrating levo to current parameters.

## 2023-04-01 NOTE — PROGRESS NOTES
Pt supinated at 1130, pt's pulse ox began to slowly decrease.  Decision made to reprone at 1150, o2 sats 69-75%.  Pt proned with team.  Oxygen saturations improved to low 90's, but peak pressure alarms on vent.  No obstruction noted on ET tube, Dr Mcdonald updated, stat chest xray ordered.

## 2023-04-01 NOTE — PROGRESS NOTES
At 0431,Leonora from Lab called with critical phos result of 12.1. Critical lab result read back to [place].   Dr. Ludwig notified of critical lab result at 0431.  Critical lab result read back by Dr. Ludwig.

## 2023-04-01 NOTE — PROGRESS NOTES
"[facility] ICU Progress Note    Admit Date: 3/21/2023    Resident(s): Hemal Tariq D.O.   Attending:  CARTER WELCH/ Dr. Mcdonald    Patient ID:    Name:  Josse Valle   YOB: 1965  Age:  57 y.o.  male   MRN:  [id number]    Chief Complaint  Acute Hypoxic Respiratory Failure    Hospital Course (carried forward and updated):  \"57-year-old male, PMH ruptured SAH 2013, disabled since, HTN, obesity, prior smoker quit 2 years ago, COPD not usually oxygen dependent, recently admitted at [facility] 3/9 - 3/12 with presumed COPD exacerbation and pneumonia, treated with steroids and antibiotics, discharged home on 6 L oxygen.  After completed steroids he began feeling much worse, readmitted 3/19 with increasing respiratory distress, requiring high flow nasal cannula alternating with BiPAP and high FiO2.  CT scan of the chest 3/21 showed no evidence of pulm embolism, significant bilateral groundglass and reticular infiltrates throughout all lung fields.  He is started back on IV corticosteroids, broad-spectrum antibiotics for possible HCAP with cefepime and vancomycin and transferred to [facility] for further evaluation.  Further work-up there included a high CRP of 261, ESR 65, WBC 23 and no peripheral eosinophilia.     Patient worked as a  until his ruptured SAH in 2013 and has been on disability since.  He does not have any pussycats or birdies but does have a little puppy chiwennie.  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  He denies any recreational/illicit drug use.  He had not required supplemental oxygen until his recent hospitalization.\" - Dr. Martin's note    3/21 - admitted to ICU.  3/22 - s/p solumedrol 500mg IV x1, followed with 80 Q8H  3/22 - lasix 40 Q12H   3/25 - unasyn changed to cefepime due to Pseudomonas on sputum culture  3/26 - Intubated  3/27 - VD #2, APV-CMV, RR 32, PEEP 10,  ; " started high dose solumedrol 80->250 q6h, Insulin GTT, stopped paralytics  3/28 - VD #3, not responding to steroids, reached out to transfer centers for ECMO  3/29 - VD #4, APV-CMV, RR 32->34, PEEP 10->12, ->400, FiO2 70%->80%->90%, not a candidate for ECMO w/ BMI 40s.   3/30 - VD #5, worsening hypercapnea, bronch + suctioning, iHD started for HAYES w/ oliguria  3/31-  VD #6, HD #2, Soft BP with HD. Had 2.5L off yesterday. HD running this AM. Levo for MAP>65. Dr. Mcdonald and Dr. Loredo (pulm) had GOC conversation yesterday with wife. She would like patient to remain full code at this time.     Consultants:  Critical Care  Pulmonology    Interval Events:    Pulm recommends solumedrol 40 mg q12h and down-titrate, consider TKI Nintedanib if progression suspected, palliative  Neph UF Goal 3L    WBC 33.4->30.9  K 6.2->5.9, CO2 22, AG 16->19, BUN 95->90, Phosph 12.1, Corrected CA 9.4, Mg 2.3  Triglycerides 393  Sputum Cx (3/23/23): pseudomonas, BAL 3/26 NGTD, Bcx 3/27 NGTD      Reviewed last 24 hour events:   Neuro: unchanged. On [place]. RASS -5, nor corneal, cough, or gag  HR: [] 111 ; ST  SBP: ()/(43-70) 121/61 ; vasopressin  Tmax: AF  GI: Last BM 23, no emesis, NG tube feeding, Impact Peptide 1.5, Goal 50 ml/hr for 24 hr  I/O: +3187/-3890 (-3000 HD, -40 urine)/-703, Net -3049 since admit (3/21/23)  Lines: CVC, NG tube, L radial art line, trejo  Mobility: Level 1  Resp: VD #7, APV-CMV, RR 40, PEEP 10, , duoneb q4h, mucomyst q4h, methylprednisolone 40 mg every 12 hours, proning  Ab.02/93.4/78.4/24 from 7.17/73.6/76.2/26  CXR: pulmonary edema and/or infiltrates, stable  Vte: Heparin  PPI/H2: H2  Antibx: Cefepime (3/25- end today 3/31), Doxy (3/26- end today 3/31)  Drips: fent 600 mcg/hr, prop 80 mcg/kg/min, vecuronium 10 mcg/kg/min, vasopressin 0.03 units/min started today, Levophed started no insulin GTT required,     -Increasing respiratory acidosis w/ optimized vent settings + volume  status  -Switch insulin GTT to SSI  -Finished 7 day course Cefepime + Doxy  -Continue w/ methylprednisolone 40 mg IV BID  -Continue w/ dialysis for euvolemia and electrolyte correction  -Poor prognosis, palliative discussion      Review of Systems  Review of Systems   Unable to perform ROS: Intubated   Cardiovascular:  Negative for orthopnea.     Vital Signs for the last 24 hours  Temp:  [36 °C (96.8 °F)-36.5 °C (97.7 °F)] 36.2 °C (97.2 °F)  Pulse:  [] 111  Resp:  [36-43] 40  BP: ()/(43-70) 121/61  SpO2:  [90 %-95 %] 95 %    Hemodynamic parameters for the last 24 hours       Vent Settings for the last 24 hours  Vent Mode: APVCMV  Rate (breaths/min): 40  Vt Target (mL): 370  PEEP/CPAP: 10  MAP: 21  Control VTE (exp VT): 364    Physical Exam  Physical Exam  Constitutional:       Appearance: He is ill-appearing.      Comments: Generalized edema   HENT:      Head: Atraumatic.      Mouth/Throat:      Mouth: Mucous membranes are moist.   Eyes:      Extraocular Movements: Extraocular movements intact.   Cardiovascular:      Rate and Rhythm: Normal rate and regular rhythm.      Comments: RIJ in place, left radial arterial line in place  Pulmonary:      Comments: Mechanical breath sounds  Abdominal:      Palpations: Abdomen is soft.   Genitourinary:     Comments: Cole in place  Musculoskeletal:         General: No swelling, tenderness or deformity.      Right lower leg: Edema present.      Left lower leg: Edema present.   Skin:     General: Skin is warm and dry.   Neurological:      Comments: Intubated and sedated   Psychiatric:      Comments: Unable to assess       Medications  Current Facility-Administered Medications   Medication Dose Route Frequency Provider Last Rate Last Admin    vasopressin (Vasostrict) 20 Units in  mL Infusion  0.03 Units/min Intravenous Continuous Fidel Mcdonald Jr., D.O. 9 mL/hr at 04/01/23 0740 0.03 Units/min at 04/01/23 0740    norepinephrine (Levophed) infusion 32 mg/500 mL  (premix)  0-1 mcg/kg/min (Ideal) Intravenous Continuous CHANG Huddleston Jr..OSimran 29.1 mL/hr at 04/01/23 0953 0.4 mcg/kg/min at 04/01/23 0953    insulin regular (HumuLIN R,NovoLIN R) injection  3-14 Units Subcutaneous Q6HRS Fidel Mcdonald Jr., D.OSimran        And    dextrose 10 % BOLUS 25 g  25 g Intravenous Q15 MIN PRN CHANG Huddleston Jr..OSimran        lactulose 20 GM/30ML solution 30 mL  30 mL Enteral Tube TID Fidel Mcdonald Jr. D.O.   30 mL at 04/01/23 0500    methylPREDNISolone (SOLU-MEDROL) 40 MG injection 40 mg  40 mg Intravenous Q12HRS CLARK BallardOSimran   40 mg at 04/01/23 0500    rosuvastatin (CRESTOR) tablet 10 mg  10 mg Enteral Tube Q EVENING Fidel Mcdonald Jr. D.OSimran   10 mg at 03/31/23 1713    heparin injection 2,800 Units  2,800 Units Intracatheter PRN Parisa Mccallum M.D.   2,800 Units at 04/01/23 1050    famotidine (PEPCID) tablet 20 mg  20 mg Enteral Tube DAILY Fidel Mcdonald Jr. D.O.   20 mg at 04/01/23 0500    Or    famotidine (PEPCID) injection 20 mg  20 mg Intravenous DAILY Fidel Mcdonald Jr. D.O.   20 mg at 03/31/23 0523    heparin injection 5,000 Units  5,000 Units Subcutaneous Q8HRS Fidel Mcdonald Jr. D.O.   5,000 Units at 04/01/23 0500    acetylcysteine (MUCOMYST) 20 % solution 3 mL  3 mL Inhalation Q4HRS Fidel Mcdonald Jr. D.O.   3 mL at 04/01/23 0635    artificial tears (EYE LUBRICANT) ophth ointment 1 Application.  1 Application. Both Eyes Q8HRS Dee Craig M.D.   1 Application. at 04/01/23 0500    propofol (DIPRIVAN) injection  0-80 mcg/kg/min (Ideal) Intravenous Continuous Dee Craig M.D. 37.2 mL/hr at 04/01/23 1004 80 mcg/kg/min at 04/01/23 1004    fentaNYL (SUBLIMAZE) 50 mcg/mL in 50mL (Continuous Infusion)   Intravenous Continuous Dee Craig M.D. 12 mL/hr at 04/01/23 0833 600 mcg/hr at 04/01/23 0833    rocuronium (ZEMURON) 250 mg in  mL Infusion  0-16 mcg/kg/min (Ideal) Intravenous Continuous Dee Craig M.D. 55.9 mL/hr at 04/01/23 0800 12  mcg/kg/min at 04/01/23 0800    rocuronium (ZEMURON) injection 46.6 mg  0.6 mg/kg (Ideal) Intravenous Q2HRS PRN Dee Craig M.D.   46.6 mg at 04/01/23 0859    Pharmacy Consult: Enteral tube insertion - review meds/change route/product selection  1 Each Other PHARMACY TO DOSE Fidel Mcdonald Jr., D.O.        fentaNYL (SUBLIMAZE) injection  mcg   mcg Intravenous Q HOUR PRN Adrian Ludwig M.D.   100 mcg at 03/30/23 0110    senna-docusate (PERICOLACE or SENOKOT S) 8.6-50 MG per tablet 2 Tablet  2 Tablet Enteral Tube BID Vince Daniels D.O.   2 Tablet at 04/01/23 0500    And    polyethylene glycol/lytes (MIRALAX) PACKET 1 Packet  1 Packet Enteral Tube QDAY PRN Vince Daniels D.O.   1 Packet at 03/31/23 0818    And    magnesium hydroxide (MILK OF MAGNESIA) suspension 30 mL  30 mL Enteral Tube QDAY PRN Vince Daniels D.O.        And    bisacodyl (DULCOLAX) suppository 10 mg  10 mg Rectal QDAY PRN Vince Daniels D.O.        sertraline (Zoloft) tablet 100 mg  100 mg Enteral Tube DAILY Vince Daniels D.O.   100 mg at 04/01/23 0500    acetaminophen (TYLENOL) tablet 1,000 mg  1,000 mg Enteral Tube Q6HRS PRN Vince Daniels D.O.        Respiratory Therapy Consult   Nebulization Continuous RT Vince Daniels D.O.        lidocaine (XYLOCAINE) 1 % injection 2 mL  2 mL Tracheal Tube Q30 MIN PRN Vince Daniels D.O.        ipratropium-albuterol (DUONEB) nebulizer solution  3 mL Nebulization Q4H PRN (RT) Chino Mendoza M.D.   3 mL at 03/21/23 2338    ipratropium-albuterol (DUONEB) nebulizer solution  3 mL Nebulization Q4HRS (RT) Brennan Martin M.D.   3 mL at 04/01/23 0635       Fluids    Intake/Output Summary (Last 24 hours) at 4/1/2023 1024  Last data filed at 4/1/2023 1016  Gross per 24 hour   Intake 3088.53 ml   Output 30 ml   Net 3058.53 ml       Laboratory  Recent Labs     03/30/23  1502 03/30/23  2222 03/31/23  0755 03/31/23  1552 03/31/23  2345 04/01/23  0425 04/01/23  0754   [id number]  --   --    < > 7.17* 7.01* 7.02*  --     [id number]  --   --    < > 73.6* 100.5* 93.4*  --    [id number]  --   --    < > 76.2 80.6 78.4  --    IEAO0TBZ  --   --    < > 93.6 92.2* 93.1  --    ARTHCO3  --   --    < > 26* 25 24  --    ARTBE  --   --    < > -4 -9* -9*  --    ISTATAPH 7.151* 7.201*  --   --   --   --  7.109*   ISTATAPCO2 81.4* 71.4*  --   --   --   --  76.0*   ISTATAPO2 66 77  --   --   --   --  59*   ISTATATCO2 31 30  --   --   --   --  26   [id number] 85* 91*  --   --   --   --  78*   ISTATARTHCO3 28.4* 28.0*  --   --   --   --  24.1   ISTATARTBE -3 -2  --   --   --   --  -7*   ISTATTEMP 36.3 C 36.5 C  --   --   --   --  36.4 C   ISTATFIO2 90 90  --   --   --   --  80   ISTATSPEC Arterial Arterial  --   --   --   --  Arterial   ISTATAPHTC 7.161* 7.208*  --   --   --   --  7.117*   BYIMJGQG5MW 63* 75  --   --   --   --  56*    < > = values in this interval not displayed.     Recent Labs     03/31/23 0315 04/01/23 0100 04/01/23 0105   SODIUM 135 136 137   POTASSIUM 4.6 6.2* 5.9*   CHLORIDE 96 95* 95*   CO2 23 22 23   BUN 95* 90* 87*   CREATININE 2.78* 3.90* 3.67*   MAGNESIUM 2.4 2.3  --    PHOSPHORUS 7.4* 12.1*  --    CALCIUM 8.4* 8.6 8.9     Recent Labs     03/31/23 0315 04/01/23 0100 04/01/23 0105   ALTSGPT 28 36 37   ASTSGOT 19 26 28   ALKPHOSPHAT 79 94 96   TBILIRUBIN 0.2 0.2 0.3   GLUCOSE 195* 160* 151*     Recent Labs     03/31/23  0315 04/01/23  0100 04/01/23  0105   WBC 21.2* 33.4* 30.9*   NEUTSPOLYS 81.80* 83.80* 85.20*   LYMPHOCYTES 7.30* 6.10* 4.90*   MONOCYTES 9.10 7.00 7.00   EOSINOPHILS 0.30 0.60 0.40   BASOPHILS 0.10 0.20 0.20   ASTSGOT 19 26 28   ALTSGPT 28 36 37   ALKPHOSPHAT 79 94 96   TBILIRUBIN 0.2 0.2 0.3     Recent Labs     03/31/23  0315 04/01/23  0100 04/01/23  0105   RBC 4.82 5.15 5.38   HEMOGLOBIN 13.2* 14.3 14.7   HEMATOCRIT 41.8* 45.1 47.6   PLATELETCT 173 216 204       Imaging  X-Ray:  I have personally reviewed the images and compared with prior images.  EKG:  I have personally reviewed the images and  compared with prior images.  CT:    Reviewed  Echo:   Reviewed    ASSESSEMENT and PLAN:    * ARDS (adult respiratory distress syndrome) (HCC)  Assessment & Plan  [place] 2/2 to PSA PNA  *Intubated 3/26, pending work-up for cause as below for acute respiratory failure with hypoxia  *PaO2/FiO2 ratio 137 mm Hg, Moderate ARDS  *s/p 3 days of Solumedrol 250 mg q6h- no responsive     -Pulm recommends solumedrol 40 mg q12h and down-titrate, consider TKI Nintedanib if progression suspected, palliative, signed off (3/31/23)  -Continue w/ solumedrol 40 mg q12h (3/30-)  -Management as below for acute respiratory failure with hypoxia  -Increasing CO2 retention, ventilator settings optimized, will continue to monitor  -Goal sat >88%, paO2 >55, Pplat <30  -Serial ABGs  -CXR to monitor lung volumes and tube/line placement   -VAP bundle prevention, oral care, post pyloric feeding   -Head of bed > 30 degree   -GI prophylaxis   -Daily awakening and SBT trials unless contraindicated   -Monitor for liberation   -Respiratory treatments: prn       Acute respiratory failure with hypoxemia (HCC)- (present on admission)  Assessment & Plan  *Due to severe ARDS due to concern of suspected ILD, unclear etiology on admission  *ANCA, MPO & PR3 negative, THELMA negative, CTD panel negative,  HP panel negative, Coccidiodes antibodies negative, resp viral panel negative, legionella and strep pneumo negative, pneumocystitis negative, IGE serum WNL, fungal culture negative, PCP DFA negative,  Aspergillus negative, Sputum DFA negative, Culture with PSA + growth, BAL 3/26 - white, clear, polys 85%, lymph 4%  *CT chest w/o (3/27/23):  w/ diffuse hazy parenchymal opacities, vs pneumonitis, probable background pulmonary fibrosis  *Not a candidate to transfer for ECMO w/ [place] Lung Score: 3.3 due to BMI 40.59  *Possibly fibrotic syndrome such as diffuse alveolar damage likely 2/2 ARDS from pneumonia.    -Extended myositis panel under misc on 3/26 - [facility]  [id number] pending   -ARDS management as stated above  -Will hold off for flolan at this time and reassess   -Continue with proning 16 hr + supine 8 hr  -Tube feeding (3/27-)  -Stop paralytics, keep RAAS -5  -Completed 7 day course of Cefepime + Doxycycline w/ stop date 3/31  -Continue w/ methylprednisolone 40 mg IV BID  -Continue w/ dialysis for euvolemia and electrolyte correction  -Poor prognosis, palliative discussion    Hyperglycemia  Assessment & Plan  *Likely 2/2 steroid use    -Insulin GTT, goal of 120-180, switch to SSI on (4/1/23)    Acute kidney injury (HCC)  Assessment & Plan  *Cr 0.71 -->2.55-->2.45  *2/2 contrast induced nephropathy  * Started HD 3/30    -Continue w/ HD, nephrology following, appreciate recommendations    Pseudomonas pneumonia (HCC)  Assessment & Plan  *Sputum culture (3/23/23): + for pseudomonas koreensis, pan sensitivity, C3  *Bcx 3/26 NGTD     -Continue Cefepime x 7 days    Severe pulmonary hypertension (HCC)  Assessment & Plan  *This is evident on echo done on 03/22/2023 showing RVSP of 65-70 with an EF of 55%.  Also showing moderately dilated right ventricle.  *This is likely secondary to not only sleep apnea but also  highly suspect that the ILD    -Continue furosemide and steroid  -Hold off on Flolan for now    (HFpEF) heart failure with preserved ejection fraction (HCC)  Assessment & Plan  *Acute on chronic, BNP 2085, some pleural edema noted on CXR   *Echo (3/22/23): EF 55%, RSVP 65-70 mmHg, moderately dilated right ventricle    -Daily weight  -Daily I's and O's  -Hold home metoprolol and benazepril 2/2 critical illness    Goals of care, counseling/discussion  Assessment & Plan  Discussed the patient's declining condition with his wife, options for care including comfort measures and continued aggressive care which may be unsuccessful, and if successful will likely lead to prolonged ventilation and 24/7 care. She remains hopeful and requests full treatment including  HD.    Suspected sleep apnea  Assessment & Plan  STOP BANG of 7      Obesity  Assessment & Plan  *BMI 40.59    -Discuss about weight management and nutrition outpatient    COPD (chronic obstructive pulmonary disease) ([facility])  Assessment & Plan    -Hold home inhalers given severity of condition    HTN (hypertension)  Assessment & Plan    -Hold antihypertensives 2/2 acuity of condition    Mood disorder ([facility])  Assessment & Plan  Cont home sertraline    Hyperlipidemia  Assessment & Plan  Cont home statin    Subarachnoid hemorrhage from anterior communicating artery ([facility])- (present on admission)  Assessment & Plan  *History of in 2013        VTE:  Heparin  Ulcer: H2 Antagonist  Lines: Central Line  Ongoing indication addressed, Arterial Line  Ongoing indication addressed, and Cole Catheter  Ongoing indication addressed    I have performed a physical exam and reviewed and updated ROS and Plan today (4/1/2023). In review of yesterday's note (3/31/2023), there are no changes except as documented above.     Discussed patient condition and risk of morbidity and/or mortality with RN, RT, Therapies, Pharmacy, , and Charge nurse / hot rounds      Hemal Tariq D.O.  PGY-2 Internal Medicine Resident

## 2023-04-01 NOTE — PROGRESS NOTES
12 hour chart check complete.     Monitoring Summary:  Rhythm: -127 bpm  Ectopy: PVC(r)

## 2023-04-02 NOTE — CARE PLAN
The patient is Unstable - High likelihood or risk of patient condition declining or worsening    Shift Goals  Clinical Goals: MAP >65, monitor TOF  Patient Goals: ANTIONE  Family Goals: ANTIONE    Progress made toward(s) clinical / shift goals:  MAP maintained > 65.  Paralytic vacation currently underway, TOF 4/4    Patient is not progressing towards the following goals:

## 2023-04-02 NOTE — PROCEDURES
Nephrology/hemodialysis note    Patient with sefere COPD, pulmonary HTN, VDRF, volume overloaded, HAYES -on RRT  Worsening condition despite daily dialysis  Low BP on pressors  Lab results reviewed; still with hyperkalemia, worsening acidosis  UF 2-3 L as tolerates  Plan to meet faily today to discuss comfort care  Please see dialysis flow sheet for details  D/w

## 2023-04-02 NOTE — DISCHARGE SUMMARY
"Death Summary    Cause of Death  Acute hypoxic respiratory failure due to ARDS due to Pseudomonas pneumonia    Comorbid Conditions at the Time of Death  Principal Problem:    ARDS (adult respiratory distress syndrome) (HCC) POA: Unknown  Active Problems:    Subarachnoid hemorrhage from anterior communicating artery (HCC) POA: Yes    Acute respiratory failure with hypoxemia (HCC) POA: Yes    Hyperlipidemia POA: Unknown    (HFpEF) heart failure with preserved ejection fraction (HCC) POA: Unknown    Mood disorder (HCC) POA: Unknown    HTN (hypertension) POA: Unknown    COPD (chronic obstructive pulmonary disease) (HCC) POA: Unknown    Obesity POA: Unknown    Suspected sleep apnea POA: Unknown    Severe pulmonary hypertension (HCC) POA: Unknown    Pseudomonas pneumonia (HCC) POA: Unknown    Acute kidney injury (HCC) POA: Unknown    Hyperglycemia POA: Unknown    Goals of care, counseling/discussion POA: No  Resolved Problems:    ILD (interstitial lung disease) ([facility]) POA: Unknown    Hospital acquired PNA POA: Unknown      History of Presenting Illness and Hospital Course  This is a \"57-year-old male, PMH ruptured SAH 2013, disabled since, HTN, obesity, prior smoker quit 2 years ago, COPD not usually oxygen dependent, recently admitted at [facility] 3/9 - 3/12 with presumed COPD exacerbation and pneumonia, treated with steroids and antibiotics, discharged home on 6 L oxygen.  He tells me that 4 4-5 days after discharge, he completed steroids then began feeling much worse, he was readmitted 3/19 with increasing respiratory distress.  He has been requiring high flow nasal cannula alternating with BiPAP and high FiO2.  CT scan of the chest 3/21 showed no evidence of pulm embolism, significant bilateral groundglass and reticular infiltrates throughout all lung fields.  He is started back on IV corticosteroids, broad-spectrum antibiotics for possible HCAP with cefepime and vancomycin and transferred to [facility] for further " "evaluation.  Further work-up there included a high CRP of 261, ESR 65, WBC 23 and no peripheral eosinophilia.     Patient worked as a  until his ruptured SAH in  and has been on disability since.  He does not have any cats or bird but does have a dog.  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  He denies any recreational/illicit drug use.  He had not required supplemental oxygen until his recent hospitalization.\"    3/21 Admitted to ICU.  3/22 s/p solumedrol 500mg IV x1, followed with 80 Q8H  3/22 Lasix 40 Q12H   3/25 unasyn changed to cefepime due to Pseudomonas on sputum culture  3/26 Intubated, significant respiratory acidosis  3/30 HD started for HAYES with oliguria  3/31 Worsening PF, respiratory acidosis, unable to ventilate   VD #6, PF 74. Continue HD and proning, unable to supinate    Family presented to bedside and requested to transition to comfort care. He was compassionately extubated to comfort care and  with dignity and peace with his family at bedside.    Death Date: 23   Death Time: 1308         Pronounced By (RN1): Ila Dela Cruz  Pronounced By (RN2): Ashley Rizvi  "

## 2023-04-02 NOTE — PROGRESS NOTES
Blood cultures collected, RT attempted to collect sputum for culture, not enough sputum to send down sputum culture prior to starting antibiotics

## 2023-04-02 NOTE — CARE PLAN
The patient is Watcher - Medium risk of patient condition declining or worsening    Shift Goals  Clinical Goals: hemodynmaic stability, q2 turs  Patient Goals: ANTIONE  Family Goals: ANTIONE    Progress made toward(s) clinical / shift goals:    Problem: Pain - Standard  Goal: Alleviation of pain or a reduction in pain to the patient’s comfort goal  Description: Target End Date:  Prior to discharge or change in level of care    Document on Vitals flowsheet    1.  Document pain using the appropriate pain scale per order or unit policy  2.  Educate and implement non-pharmacologic comfort measures (i.e. relaxation, distraction, massage, cold/heat therapy, etc.)  3.  Pain management medications as ordered  4.  Reassess pain after pain med administration per policy  5.  If opiods administered assess patient's response to pain medication is appropriate per POSS sedation scale  6.  Follow pain management plan developed in collaboration with patient and interdisciplinary team (including palliative care or pain specialists if applicable)  Outcome: Progressing   Patient medicated per MAR    Problem: Safety - Medical Restraint  Goal: Free from restraint(s) (Restraint for Interference with Medical Device)  Description: INTERVENTIONS:  1.  ONCE/SHIFT or MINIMUM Q12H: Assess and document the continuing need for restraints  2.  Q24H: Continued use of restraint requires LIP to perform face to face examination and written order  3.  Identify and implement measures to help patient regain control  4.  Educate patient/family on discontinuation criteria   5.  Assess patient's understanding and retention of education provided  6.  Assess readiness for release & initiate progressive release per protocol  7.  Identify and document criteria for restraints  Outcome: Progressing   Family educated on use of restraints. Restraints discontinued during shift

## 2023-04-02 NOTE — PROGRESS NOTES
Hemodialysis done today, started @ 0932 and ended @ 1224 with net UF= 1911 ml. Blood returned, tx terminated 11 mins before the end of tx due to sudden increase  in heart rate to 160's  and dropping BP. Patient transitioned to comfort care, Dr Mccallum notified with order to stop HD for today. Report given to KIMBER Dela Cruz. See flow sheet for details

## 2023-04-02 NOTE — PROGRESS NOTES
Spiritual Care Note      Patient's Name: oJsse Valle   MRN: [id number]    YOB: 1965   Age and Gender: 57 y.o. male   Unit/Service Area: [facility]   Room (and Bed): 18   Ethnicity or Nationality:     Primary Language: English   Synagogue/Spiritual Preference: none   Place of Residence: [facility] [place]   Family/Friends/Others Present: mother  spouse  children and partners  grandchild   Medical Diagnoses/Procedures: acute respiratory failure w/ hypoxemia   ARDS  severe pulmonary hypertension   HAYES  hospital acquired pneumonia  COPD   hypertension  mood disorder  HFpEF  interstitial lung disease  hyperlipidemia   Code Status: Comfort Care/DNR    Date of Admission: 3/21/2023   Length of Stay: 12 days        Doctor's Note:   Patient's family arrived and wishes to transition to comfort measures.   has been called at family's request.  The patient will be compassionately extubated following stopping continuous sedation with his family at bedside and allowed to pass with dignity and peace.      Nature of the Visit:   Initial, Call back    Crisis Visit:   Patient actively dying/EOL    Referral from/Origin of Request:   Verbal/ Staff/ [facility]     Visited with:   Patient and family together    Observations/Symptoms:   Patient laying in bed, intubated unable to communicate meaningfully.    Interaction/Conversation:   Family needing emotional support as patient .  They requested prayer for patient after extubation.    Assessment:   Need, Distress    Need:   Seeking Spiritual Assistance and Support    Distress:   Grief/Loss/Bereavement    Synagogue & Ritual Needs:   EOL Ritual -- Final Prayer of Commendation    Intended Effects:   Demonstrate Caring and Concern, Establish Rapport and Connectedness, Journeying with Someone in Grief Process    Interventions:   Compassionate Presence, Emotional Support, prayer    Outcomes:   Emotional Release, Progress with Grief    Total Time:   60  minutes      Spiritual Care Provider  Chaplain NAKIA Bull  [phone]   [email or web address]

## 2023-04-02 NOTE — CONSULTS
Advanced Care Planing and Goals of Care       Medical decision maker: Wife, Paula    Present:   Paula (wife)  Marlene (daughter)  Josse Stevenson (son)    Narrative of discussion: I had a long discussion with the family regarding Josse's course and clinical status.  The family has been well appraised of the current condition and the course of his severe lung injury on top of what sounds to be a progressive interstitial lung disease of unknown etiology at this time.  We discussed his prognosis and we discussed the therapies that were ongoing in his current requirement for very high levels of ventilator support and advanced therapies for ARDS including prone ventilation, deep sedation, lung protective ventilation, high levels of inspired oxygen and his requirement for vasopressors and inotropes to maintain perfusion pressures.  I did explain to the family, that even with maximal therapies that have been given and were ongoing that we were unable to achieve satisfactory gas exchange and lung mechanics.    The family told me a little bit about Josse and his life and his passions including golf and food in his family, and they also told me a lot about the fact that his functional status has declined significantly over the last couple of years, owing to what he describes like increased exercise intolerance and increased dyspnea and specifically over the last several months has been unable to perform the things that he really likes to do including outdoors and golf.  They do not think he is unhappy but they do not think he is living the quality of life that he previously enjoyed.    They understand that Josse's maximally supported with life support and yet we are still barely able to maintain physiologic parameters are compatible with life and that is very likely that he is going to succumb to this critical illness.  We also discussed advanced therapies including ECMO and transplant and these have been pursued by the primary  "team, and his candidacy is in question.  The family understands this, they also understand that even with mechanical circulatory support his outcome remains very poor.    They made it very clear to me that they did not want Josse to go undergo prolonged suffering.  They are very worried that he is already gone through too much, and they are having a very hard time watching him in the ICU on all the machines, deeply sedated and they tell me unequivocally that \"he would not want to live like this\"    They also express the certainty that if he were to survive this illness and be significantly debilitated, which at this point is a certainty, he would not want to undergo nursing facility, trach dependence, or to be so functionally debilitated that he could not do the things that he enjoys.    We discussed his CODE STATUS, we discussed the outcomes of CPR in the event of a cardiac arrest in this particular clinical scenario and with his critical illness and they were very clear they wanted to change his CODE STATUS to DNR, in fact they called me into the room to tell me this.    After lengthy discussion they actually told me that they wanted to transition Josse to comfort care, after a very challenging and difficult decision making process on their part, and multiple discussions, but they would like to wait till tomorrow.  They are not sure if Josse's mother wants to come visit from [place], but nevertheless that is their intention to transition to comfort tomorrow but wanted to sleep on it.        Summary: Transition to DNR, will maintain current therapies for time being.  They tell me they would like to transition him to comfort tomorrow and the family can be here.  They will come in the morning discussed with primary day team    Time statement:  I discussed advance care planning with the patient's family and patient's DPOA for at least 40 minutes, including diagnosis, prognosis, plan of care, risks and benefits of any " therapies that could be offered, as well as alternatives including palliation and hospice, as appropriate, exclusive of evaluation and management or other separately billable procedures.

## 2023-04-02 NOTE — PROGRESS NOTES
"[facility] Progress Note    Admit Date: 3/21/2023    Resident(s): Hemal Tariq D.O.   Attending:  CARTER WELCH/ Dr. Mcdonald    Patient ID:    Name:  Josse Valle   YOB: 1965  Age:  57 y.o.  male   MRN:  [id number]    Chief Complaint  Acute Hypoxic Respiratory Failure    Hospital Course (carried forward and updated):  \"57-year-old male, PMH ruptured SAH 2013, disabled since, HTN, obesity, prior smoker quit 2 years ago, COPD not usually oxygen dependent, recently admitted at [facility] 3/9 - 3/12 with presumed COPD exacerbation and pneumonia, treated with steroids and antibiotics, discharged home on 6 L oxygen.  After completed steroids he began feeling much worse, readmitted 3/19 with increasing respiratory distress, requiring high flow nasal cannula alternating with BiPAP and high FiO2.  CT scan of the chest 3/21 showed no evidence of pulm embolism, significant bilateral groundglass and reticular infiltrates throughout all lung fields.  He is started back on IV corticosteroids, broad-spectrum antibiotics for possible HCAP with cefepime and vancomycin and transferred to [facility] for further evaluation.  Further work-up there included a high CRP of 261, ESR 65, WBC 23 and no peripheral eosinophilia.     Patient worked as a  until his ruptured SAH in 2013 and has been on disability since.  He does not have any pussycats or birdies but does have a little puppy chiwennie.  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  He denies any recreational/illicit drug use.  He had not required supplemental oxygen until his recent hospitalization.\" - Dr. Martin's note    3/21 - admitted to ICU.  3/22 - s/p solumedrol 500mg IV x1, followed with 80 Q8H  3/22 - lasix 40 Q12H   3/25 - unasyn changed to cefepime due to Pseudomonas on sputum culture  3/26 - Intubated  3/27 - VD #2, APV-CMV, RR 32, PEEP 10,  ; " started high dose solumedrol 80->250 q6h, Insulin GTT, stopped paralytics  3/28 - VD #3, not responding to steroids, reached out to transfer centers for ECMO  3/29 - VD #4, APV-CMV, RR 32->34, PEEP 10->12, ->400, FiO2 70%->80%->90%, not a candidate for ECMO w/ BMI 40s.   3/30 - VD #5, worsening hypercapnea, bronch + suctioning, iHD started for HAYES w/ oliguria  3/31-  VD #6, HD #2, Soft BP with HD. Had 2.5L off yesterday. HD running this AM. Levo for MAP>65. Dr. Mcdonald and Dr. Loredo (pul) had GOC conversation yesterday with wife. She would like patient to remain full code at this time.    - VD #7, HD #3, attempt to wean off paralytics, GOC discussed    Consultants:  Critical Care  Pulmonology    Interval Events:    Over night events include Dr. Chau having GOC talking, transition to DNR/DNI, mother would visit tomorrow from [place], and transition to comfort care tomorrow     WBC 30.9 --> 25.3, HgB 14.7->12.6, Plt 204-130  Na 134, K 5.9->6.3, CO2 22, AG 19->18, BUN 90->79, Phosph 12.1->12.6, Corrected CA 9.2, Mg 2.3  Procal 2.51  Bcx (23): NGTD  Sputum Cx (3/23/23): pseudomonas koreensis, pan sensitive,C3,  BAL 3/26 NGTD, Bcx 3/27 NGTD    Reviewed last 24 hour events:   Neuro: unchanged. On [place]. RASS -5, nor corneal, cough, or gag  HR: [] 90; SR  SBP: ()/(43-89) 91/43 ; vasopressin, levophed  Tmax: AF  GI: Last BM 23, no emesis, NG tube feeding, Impact Peptide 1.5, Goal 50 ml/hr for 24 hr  I/O:+4039/-5705 (-3500 dialysis, -105 urine)/-1665, net -4866 since admit (3/21/23)  Lines: CVC, NG tube, L radial art line, trejo  Mobility: Level 1  Resp: VD #8, APV-CMV, RR 40, PEEP 10, , duoneb q4h, mucomyst q4h, methylprednisolone 40 mg every 12 hours, proning  Ab.078/78.8/101/23.3        CXR: No significant interval change of pulmonary edema  Vte: Heparin  PPI/H2: H2  Antibx: Cefepime (3/25-3/31), Doxy (3/26-3/31), Zosyn (-), Linezolid (-)  Drips: fent 600 mcg/hr, prop 80  mcg/kg/min, vecuronium OFF, vasopressin 0.03 units/min started today, Levophed 0.3 mcg/kg/min     -Blood cultures pending, restarted empiric abx  -GOC w/ mother, possible comfort care measures today      Yesterday    -Increasing respiratory acidosis w/ optimized vent settings + volume status  -Switch insulin GTT to SSI  -Finished 7 day course Cefepime + Doxy  -Continue w/ methylprednisolone 40 mg IV BID  -Continue w/ dialysis for euvolemia and electrolyte correction  -Poor prognosis, palliative discussion        Review of Systems  Review of Systems   Unable to perform ROS: Intubated   Cardiovascular:  Negative for orthopnea.     Vital Signs for the last 24 hours  Temp:  [36.1 °C (97 °F)-36.8 °C (98.2 °F)] 36.1 °C (97 °F)  Pulse:  [] 93  Resp:  [37-50] 40  BP: ()/(43-89) 91/43  SpO2:  [71 %-98 %] 97 %    Hemodynamic parameters for the last 24 hours       Vent Settings for the last 24 hours  Vent Mode: APVCMV  Rate (breaths/min): 40  Vt Target (mL): 370  PEEP/CPAP: 10  MAP: 19  Control VTE (exp VT): 363    Physical Exam  Physical Exam  Constitutional:       Appearance: He is ill-appearing.      Comments: Generalized edema   HENT:      Head: Atraumatic.      Mouth/Throat:      Mouth: Mucous membranes are moist.   Eyes:      Extraocular Movements: Extraocular movements intact.   Cardiovascular:      Rate and Rhythm: Normal rate and regular rhythm.      Comments: RIJ in place, left radial arterial line in place  Pulmonary:      Comments: Mechanical breath sounds  Abdominal:      Palpations: Abdomen is soft.   Genitourinary:     Comments: Cole in place  Musculoskeletal:         General: No swelling, tenderness or deformity.      Right lower leg: Edema present.      Left lower leg: Edema present.   Skin:     General: Skin is warm and dry.   Neurological:      Comments: Intubated and sedated   Psychiatric:      Comments: Unable to assess       Medications  Current Facility-Administered Medications   Medication  Dose Route Frequency Provider Last Rate Last Admin    calcium acetate (PHOS-LO) 667 MG tablet 2,001 mg  2,001 mg Oral Q8HRS Parisa Mccallum M.D.        vasopressin (Vasostrict) 20 Units in  mL Infusion  0.03 Units/min Intravenous Continuous CHANG Huddleston Jr..O. 9 mL/hr at 04/02/23 0450 0.03 Units/min at 04/02/23 0450    norepinephrine (Levophed) infusion 32 mg/500 mL (premix)  0-1 mcg/kg/min (Ideal) Intravenous Continuous Fidel Mcdonald Jr. D.O. 18.2 mL/hr at 04/02/23 0758 0.25 mcg/kg/min at 04/02/23 0758    insulin regular (HumuLIN R,NovoLIN R) injection  3-14 Units Subcutaneous Q6HRS Fidel Mcdonald Jr. D.O.   3 Units at 04/02/23 0502    And    dextrose 10 % BOLUS 25 g  25 g Intravenous Q15 MIN PRN Fidel Mcdonald Jr. D.O.        piperacillin-tazobactam (Zosyn) 3.375 g in  mL IVPB  3.375 g Intravenous Q8HRS Fidel Mcdonald Jr. D.O.   Stopped at 04/02/23 0826    Linezolid (ZYVOX) premix 600 mg  600 mg Intravenous Q12HRS Fidel Mcdonald Jr. D.O.   Stopped at 04/02/23 0543    lactulose 20 GM/30ML solution 30 mL  30 mL Enteral Tube TID CHANG Huddleston Jr..O.   30 mL at 04/02/23 0436    methylPREDNISolone (SOLU-MEDROL) 40 MG injection 40 mg  40 mg Intravenous Q12HRS CHANG Ballard.OSimran   40 mg at 04/02/23 0438    rosuvastatin (CRESTOR) tablet 10 mg  10 mg Enteral Tube Q EVENING CHANG Huddleston Jr..O.   10 mg at 04/01/23 1802    heparin injection 2,800 Units  2,800 Units Intracatheter PRN Parisa Mccallum M.D.   2,800 Units at 04/01/23 1050    famotidine (PEPCID) tablet 20 mg  20 mg Enteral Tube DAILY Fidel Mcdonald Jr. D.O.   20 mg at 04/02/23 0436    Or    famotidine (PEPCID) injection 20 mg  20 mg Intravenous DAILY CHANG Huddleston Jr..OSimran   20 mg at 03/31/23 0523    heparin injection 5,000 Units  5,000 Units Subcutaneous Q8HRS CHANG Huddleston Jr..O.   5,000 Units at 04/02/23 0438    acetylcysteine (MUCOMYST) 20 % solution 3 mL  3 mL Inhalation Q4HRS CHANG Huddleston Jr..OSimran   3 mL  at 04/02/23 0611    artificial tears (EYE LUBRICANT) ophth ointment 1 Application.  1 Application. Both Eyes Q8HRS Dee Craig M.D.   1 Application. at 04/02/23 0449    propofol (DIPRIVAN) injection  0-80 mcg/kg/min (Ideal) Intravenous Continuous Dee Craig M.D. 37.2 mL/hr at 04/02/23 0702 80 mcg/kg/min at 04/02/23 0702    fentaNYL (SUBLIMAZE) 50 mcg/mL in 50mL (Continuous Infusion)   Intravenous Continuous Dee Craig M.D. 12 mL/hr at 04/02/23 0817 600 mcg/hr at 04/02/23 0817    Pharmacy Consult: Enteral tube insertion - review meds/change route/product selection  1 Each Other PHARMACY TO DOSE Fidel Mcdonald Jr., D.O.        senna-docusate (PERICOLACE or SENOKOT S) 8.6-50 MG per tablet 2 Tablet  2 Tablet Enteral Tube BID CLARK PinoOSimran   2 Tablet at 04/02/23 0436    And    polyethylene glycol/lytes (MIRALAX) PACKET 1 Packet  1 Packet Enteral Tube QDAY PRN CLARK PinoO.   1 Packet at 03/31/23 0818    And    magnesium hydroxide (MILK OF MAGNESIA) suspension 30 mL  30 mL Enteral Tube QDAY PRN Vince Daniels D.O.        And    bisacodyl (DULCOLAX) suppository 10 mg  10 mg Rectal QDAY PRN CLARK PinoOSimran        sertraline (Zoloft) tablet 100 mg  100 mg Enteral Tube DAILY CLARK PinoO.   100 mg at 04/02/23 0436    acetaminophen (TYLENOL) tablet 1,000 mg  1,000 mg Enteral Tube Q6HRS PRN Vince Daniels D.O.        Respiratory Therapy Consult   Nebulization Continuous RT Vince Daniels D.O.        lidocaine (XYLOCAINE) 1 % injection 2 mL  2 mL Tracheal Tube Q30 MIN PRN Vince Daniels D.O.        ipratropium-albuterol (DUONEB) nebulizer solution  3 mL Nebulization Q4H PRN (RT) Chino Mendoza M.D.   3 mL at 03/21/23 2338    ipratropium-albuterol (DUONEB) nebulizer solution  3 mL Nebulization Q4HRS (RT) Brennan Martin M.D.   3 mL at 04/02/23 0611       Fluids    Intake/Output Summary (Last 24 hours) at 4/2/2023 0940  Last data filed at 4/2/2023 0600  Gross per 24 hour   Intake 3742.37 ml   Output 5705 ml    Net -1962.63 ml       Laboratory  Recent Labs     03/31/23  1552 03/31/23  2345 04/01/23  0425 04/01/23  0754 04/01/23  2328 04/02/23  0353 04/02/23  0628   [id number] 7.17* 7.01* 7.02*  --   --   --   --    CXGQEC158A 73.6* 100.5* 93.4*  --   --   --   --    JRSVQ237H 76.2 80.6 78.4  --   --   --   --    COBY2CSE 93.6 92.2* 93.1  --   --   --   --    ARTHCO3 26* 25 24  --   --   --   --    ARTBE -4 -9* -9*  --   --   --   --    ISTATAPH  --   --   --    < > 7.137* 7.114* 7.078*   ISTATAPCO2  --   --   --    < > 68.1* 71.9* 78.8*   ISTATAPO2  --   --   --    < > 86 91* 101*   ISTATATCO2  --   --   --    < > 25 25 26   MCDLWCT2EWG  --   --   --    < > 92* 93 94   ISTATARTHCO3  --   --   --    < > 23.0 23.1 23.3   ISTATARTBE  --   --   --    < > -7* -8* -8*   ISTATTEMP  --   --   --    < > 36.1 C 36.1 C 36.1 C   ISTATFIO2  --   --   --    < > 100 100 100   ISTATSPEC  --   --   --    < > Arterial Arterial Arterial   ISTATAPHTC  --   --   --    < > 7.148* 7.126* 7.090*   [id number]  --   --   --    < > 82 86 96*    < > = values in this interval not displayed.     Recent Labs     03/31/23  0315 04/01/23  0100 04/01/23  0105 04/02/23  0500   SODIUM 135 136 137 134*   POTASSIUM 4.6 6.2* 5.9* 6.3*   CHLORIDE 96 95* 95* 93*   CO2 23 22 23 18*   BUN 95* 90* 87* 79*   CREATININE 2.78* 3.90* 3.67* 4.60*   MAGNESIUM 2.4 2.3  --  2.3   PHOSPHORUS 7.4* 12.1*  --  12.6*   CALCIUM 8.4* 8.6 8.9 8.2*     Recent Labs     04/01/23  0100 04/01/23  0105 04/02/23  0500   ALTSGPT 36 37 29   ASTSGOT 26 28 24   ALKPHOSPHAT 94 96 89   TBILIRUBIN 0.2 0.3 0.4   GLUCOSE 160* 151* 167*     Recent Labs     04/01/23  0100 04/01/23  0105 04/02/23  0500   WBC 33.4* 30.9* 25.3*   NEUTSPOLYS 83.80* 85.20* 83.80*   LYMPHOCYTES 6.10* 4.90* 5.60*   MONOCYTES 7.00 7.00 6.20   EOSINOPHILS 0.60 0.40 0.60   BASOPHILS 0.20 0.20 0.20   ASTSGOT 26 28 24   ALTSGPT 36 37 29   ALKPHOSPHAT 94 96 89   TBILIRUBIN 0.2 0.3 0.4     Recent Labs     04/01/23  0100  04/01/23  0105 04/02/23  0500   RBC 5.15 5.38 4.60*   HEMOGLOBIN 14.3 14.7 12.6*   HEMATOCRIT 45.1 47.6 38.9*   PLATELETCT 216 204 130*       Imaging  X-Ray:  I have personally reviewed the images and compared with prior images.  EKG:  I have personally reviewed the images and compared with prior images.  CT:    Reviewed  Echo:   Reviewed    ASSESSEMENT and PLAN:    * ARDS (adult respiratory distress syndrome) (HCC)  Assessment & Plan  Likley 2/2 to PSA PNA  *Intubated 3/26, pending work-up for cause as below for acute respiratory failure with hypoxia  *PaO2/FiO2 ratio 137 mm Hg, Moderate ARDS  *s/p 3 days of Solumedrol 250 mg q6h- no responsive     -Pulm recommends solumedrol 40 mg q12h and down-titrate, consider TKI Nintedanib if progression suspected, palliative, signed off (3/31/23)  -Continue w/ solumedrol 40 mg q12h (3/30-)  -Management as below for acute respiratory failure with hypoxia  -Increasing CO2 retention, ventilator settings optimized, will continue to monitor  -Goal sat >88%, paO2 >55, Pplat <30  -Serial ABGs  -CXR to monitor lung volumes and tube/line placement   -VAP bundle prevention, oral care, post pyloric feeding   -Head of bed > 30 degree   -GI prophylaxis   -Daily awakening and SBT trials unless contraindicated   -Monitor for liberation   -Respiratory treatments: prn       Acute respiratory failure with hypoxemia (HCC)- (present on admission)  Assessment & Plan  *Due to severe ARDS due to concern of suspected ILD, unclear etiology on admission  *ANCA, MPO & PR3 negative, THELMA negative, CTD panel negative,  HP panel negative, Coccidiodes antibodies negative, resp viral panel negative, legionella and strep pneumo negative, pneumocystitis negative, IGE serum WNL, fungal culture negative, PCP DFA negative,  Aspergillus negative, Sputum DFA negative, Culture with PSA + growth, BAL 3/26 - white, clear, polys 85%, lymph 4%  *CT chest w/o (3/27/23):  w/ diffuse hazy parenchymal opacities, vs  pneumonitis, probable background pulmonary fibrosis  *Not a candidate to transfer for ECMO w/ [place] Lung Score: 3.3 due to BMI 40.59  *Possibly fibrotic syndrome such as diffuse alveolar damage likely 2/2 ARDS from pneumonia.    -Extended myositis panel under misc on 3/26 - [facility] [id number] pending   -Blood cultures pending, restarted empiric abx  -GOC w/ mother, possible comfort care measures today  -ARDS management as stated above  -Will hold off for flolan at this time and reassess   -Continue with proning 16 hr + supine 8 hr  -Tube feeding (3/27-)  -Stop paralytics, keep RAAS -5  -Completed 7 day course of Cefepime + Doxycycline w/ stop date 3/31  -Continue w/ methylprednisolone 40 mg IV BID  -Continue w/ dialysis for euvolemia and electrolyte correction  -Poor prognosis, palliative discussion    Hyperglycemia  Assessment & Plan  *Likely 2/2 steroid use    -Insulin GTT, goal of 120-180, switch to SSI on (4/1/23)    Acute kidney injury (HCC)  Assessment & Plan  *Cr 0.71 -->2.55-->2.45  *2/2 contrast induced nephropathy  * Started HD 3/30    -Continue w/ HD, nephrology following, appreciate recommendations    Pseudomonas pneumonia (HCC)  Assessment & Plan  *Sputum culture (3/23/23): + for pseudomonas koreensis, pan sensitivity, C3  *Bcx 3/26 NGTD     -Continue Cefepime x 7 days    Severe pulmonary hypertension (HCC)  Assessment & Plan  *This is evident on echo done on 03/22/2023 showing RVSP of 65-70 with an EF of 55%.  Also showing moderately dilated right ventricle.  *This is likely secondary to not only sleep apnea but also  highly suspect that the ILD    -Continue furosemide and steroid  -Hold off on Flolan for now    (HFpEF) heart failure with preserved ejection fraction (HCC)  Assessment & Plan  *Acute on chronic, BNP 2085, some pleural edema noted on CXR   *Echo (3/22/23): EF 55%, RSVP 65-70 mmHg, moderately dilated right ventricle    -Daily weight  -Daily I's and O's  -Hold home metoprolol and benazepril 2/2  critical illness    Goals of care, counseling/discussion  Assessment & Plan  Discussed the patient's declining condition with his wife, options for care including comfort measures and continued aggressive care which may be unsuccessful, and if successful will likely lead to prolonged ventilation and 24/7 care. She remains hopeful and requests full treatment including HD.    Suspected sleep apnea  Assessment & Plan  STOP BANG of 7      Obesity  Assessment & Plan  *BMI 40.59    -Discuss about weight management and nutrition outpatient    COPD (chronic obstructive pulmonary disease) ([facility])  Assessment & Plan    -Hold home inhalers given severity of condition    HTN (hypertension)  Assessment & Plan    -Hold antihypertensives 2/2 acuity of condition    Mood disorder ([facility])  Assessment & Plan  Cont home sertraline    Hyperlipidemia  Assessment & Plan  Cont home statin    Subarachnoid hemorrhage from anterior communicating artery ([facility])- (present on admission)  Assessment & Plan  *History of in 2013        VTE:  Heparin  Ulcer: H2 Antagonist  Lines: Central Line  Ongoing indication addressed, Arterial Line  Ongoing indication addressed, and Cole Catheter  Ongoing indication addressed    I have performed a physical exam and reviewed and updated ROS and Plan today (4/2/2023). In review of yesterday's note (4/1/2023), there are no changes except as documented above.     Discussed patient condition and risk of morbidity and/or mortality with RN, RT, Therapies, Pharmacy, , and Charge nurse / hot rounds      Hemal Tariq D.O.  PGY-2 Internal Medicine Resident

## 2023-04-02 NOTE — CARE PLAN
Problem: Knowledge Deficit - Standard  Goal: Patient and family/care givers will demonstrate understanding of plan of care, disease process/condition, diagnostic tests and medications  Outcome: Progressing  Note: Family updated on Plan of care and condition of patient      Problem: Pain - Standard  Goal: Alleviation of pain or a reduction in pain to the patient’s comfort goal  Note: Pt monitored for S&S of pain.  Continuous infusion       Problem: Safety - Medical Restraint  Goal: Remains free of injury from restraints (Restraint for Interference with Medical Device)  Note: Restraints in place to protect lines and airways.  Monitored q2H

## 2023-04-05 ENCOUNTER — PATIENT OUTREACH (OUTPATIENT)
Dept: HEALTH INFORMATION MANAGEMENT | Facility: OTHER | Age: 58
End: 2023-04-05
Payer: MEDICARE

## 2023-04-06 LAB
BACTERIA BLD CULT: NORMAL
SIGNIFICANT IND 70042: NORMAL
SITE SITE: NORMAL
SOURCE SOURCE: NORMAL

## 2023-04-25 LAB
FUNGUS SPEC CULT: NORMAL
SIGNIFICANT IND 70042: NORMAL
SITE SITE: NORMAL
SOURCE SOURCE: NORMAL

## 2023-05-07 LAB
MYCOBACTERIUM SPEC CULT: NORMAL
RHODAMINE-AURAMINE STN SPEC: NORMAL
SIGNIFICANT IND 70042: NORMAL
SITE SITE: NORMAL
SOURCE SOURCE: NORMAL